# Patient Record
Sex: MALE | Race: WHITE | NOT HISPANIC OR LATINO | Employment: FULL TIME | URBAN - METROPOLITAN AREA
[De-identification: names, ages, dates, MRNs, and addresses within clinical notes are randomized per-mention and may not be internally consistent; named-entity substitution may affect disease eponyms.]

---

## 2023-09-15 ENCOUNTER — HOSPITAL ENCOUNTER (OUTPATIENT)
Dept: DATA CONVERSION | Facility: HOSPITAL | Age: 53
Discharge: HOME | End: 2023-09-15
Payer: COMMERCIAL

## 2023-09-15 DIAGNOSIS — R73.01 IMPAIRED FASTING GLUCOSE: ICD-10-CM

## 2023-09-15 DIAGNOSIS — E78.5 HYPERLIPIDEMIA, UNSPECIFIED: ICD-10-CM

## 2023-09-15 DIAGNOSIS — Z00.00 ENCOUNTER FOR GENERAL ADULT MEDICAL EXAMINATION WITHOUT ABNORMAL FINDINGS: ICD-10-CM

## 2023-09-15 DIAGNOSIS — Z12.5 ENCOUNTER FOR SCREENING FOR MALIGNANT NEOPLASM OF PROSTATE: ICD-10-CM

## 2023-09-15 LAB
ALBUMIN SERPL-MCNC: 4.1 GM/DL (ref 3.5–5)
ALBUMIN/GLOB SERPL: 1.4 RATIO (ref 1.5–3)
ALP BLD-CCNC: 83 U/L (ref 35–125)
ALT SERPL-CCNC: 33 U/L (ref 5–40)
ANION GAP SERPL CALCULATED.3IONS-SCNC: 13 MMOL/L (ref 0–19)
APPEARANCE PLAS: ABNORMAL
AST SERPL-CCNC: 24 U/L (ref 5–40)
BILIRUB SERPL-MCNC: 0.4 MG/DL (ref 0.1–1.2)
BUN SERPL-MCNC: 16 MG/DL (ref 8–25)
BUN/CREAT SERPL: 16 RATIO (ref 8–21)
CALCIUM SERPL-MCNC: 9.2 MG/DL (ref 8.5–10.4)
CHLORIDE SERPL-SCNC: 106 MMOL/L (ref 97–107)
CHOLEST SERPL-MCNC: 172 MG/DL (ref 133–200)
CHOLEST/HDLC SERPL: 5.4 RATIO
CO2 SERPL-SCNC: 24 MMOL/L (ref 24–31)
COLOR SPUN FLD: ABNORMAL
CREAT SERPL-MCNC: 1 MG/DL (ref 0.4–1.6)
DEPRECATED RDW RBC AUTO: 43.7 FL (ref 37–54)
ERYTHROCYTE [DISTWIDTH] IN BLOOD BY AUTOMATED COUNT: 12.8 % (ref 11.7–15)
FASTING STATUS PATIENT QL REPORTED: ABNORMAL
GFR SERPL CREATININE-BSD FRML MDRD: 89 ML/MIN/1.73 M2
GLOBULIN SER-MCNC: 3 G/DL (ref 1.9–3.7)
GLUCOSE SERPL-MCNC: 106 MG/DL (ref 65–99)
HBA1C MFR BLD: 5.3 % (ref 4–6)
HCT VFR BLD AUTO: 48.2 % (ref 41–50)
HDLC SERPL-MCNC: 32 MG/DL
HGB BLD-MCNC: 15.9 GM/DL (ref 13.5–16.5)
LDLC SERPL CALC-MCNC: 104 MG/DL (ref 65–130)
MCH RBC QN AUTO: 30.7 PG (ref 26–34)
MCHC RBC AUTO-ENTMCNC: 33 % (ref 31–37)
MCV RBC AUTO: 93.1 FL (ref 80–100)
NRBC BLD-RTO: 0 /100 WBC
PLATELET # BLD AUTO: 176 K/UL (ref 150–450)
PMV BLD AUTO: 10.7 CU (ref 7–12.6)
POTASSIUM SERPL-SCNC: 4.9 MMOL/L (ref 3.4–5.1)
PROT SERPL-MCNC: 7.1 G/DL (ref 5.9–7.9)
PSA SERPL-MCNC: 0.5 NG/ML (ref 0–4.1)
RBC # BLD AUTO: 5.18 M/UL (ref 4.5–5.5)
SODIUM SERPL-SCNC: 143 MMOL/L (ref 133–145)
TRIGL SERPL-MCNC: 179 MG/DL (ref 40–150)
WBC # BLD AUTO: 6 K/UL (ref 4.5–11)

## 2023-12-14 ENCOUNTER — OFFICE VISIT (OUTPATIENT)
Dept: PRIMARY CARE | Facility: CLINIC | Age: 53
End: 2023-12-14
Payer: COMMERCIAL

## 2023-12-14 VITALS
DIASTOLIC BLOOD PRESSURE: 80 MMHG | SYSTOLIC BLOOD PRESSURE: 152 MMHG | HEART RATE: 83 BPM | WEIGHT: 315 LBS | BODY MASS INDEX: 36.45 KG/M2 | HEIGHT: 78 IN | OXYGEN SATURATION: 95 %

## 2023-12-14 DIAGNOSIS — J40 BRONCHITIS: Primary | ICD-10-CM

## 2023-12-14 DIAGNOSIS — I10 ESSENTIAL HYPERTENSION, BENIGN: ICD-10-CM

## 2023-12-14 DIAGNOSIS — J45.909 PERSISTENT ASTHMA WITHOUT COMPLICATION, UNSPECIFIED ASTHMA SEVERITY (HHS-HCC): ICD-10-CM

## 2023-12-14 DIAGNOSIS — J45.909 PERSISTENT ASTHMA WITHOUT COMPLICATION, UNSPECIFIED ASTHMA SEVERITY (HHS-HCC): Primary | ICD-10-CM

## 2023-12-14 PROCEDURE — 87634 RSV DNA/RNA AMP PROBE: CPT | Performed by: PHYSICIAN ASSISTANT

## 2023-12-14 PROCEDURE — 99213 OFFICE O/P EST LOW 20 MIN: CPT | Performed by: PHYSICIAN ASSISTANT

## 2023-12-14 PROCEDURE — 87637 SARSCOV2&INF A&B&RSV AMP PRB: CPT | Performed by: PHYSICIAN ASSISTANT

## 2023-12-14 PROCEDURE — 3077F SYST BP >= 140 MM HG: CPT | Performed by: PHYSICIAN ASSISTANT

## 2023-12-14 PROCEDURE — 3079F DIAST BP 80-89 MM HG: CPT | Performed by: PHYSICIAN ASSISTANT

## 2023-12-14 RX ORDER — LISINOPRIL AND HYDROCHLOROTHIAZIDE 12.5; 2 MG/1; MG/1
1 TABLET ORAL DAILY
COMMUNITY
End: 2023-12-14 | Stop reason: SDUPTHER

## 2023-12-14 RX ORDER — LISINOPRIL AND HYDROCHLOROTHIAZIDE 12.5; 2 MG/1; MG/1
1 TABLET ORAL DAILY
Qty: 90 TABLET | Refills: 3 | Status: SHIPPED | OUTPATIENT
Start: 2023-12-14 | End: 2024-12-13

## 2023-12-14 RX ORDER — IBUPROFEN 800 MG/1
TABLET ORAL
COMMUNITY
Start: 2023-11-20

## 2023-12-14 RX ORDER — METHYLPREDNISOLONE 4 MG/1
TABLET ORAL
Qty: 21 TABLET | Refills: 0 | Status: SHIPPED | OUTPATIENT
Start: 2023-12-14 | End: 2023-12-21

## 2023-12-14 RX ORDER — NEBIVOLOL 10 MG/1
10 TABLET ORAL DAILY
COMMUNITY
Start: 2023-11-21 | End: 2023-12-14 | Stop reason: SDUPTHER

## 2023-12-14 RX ORDER — ROSUVASTATIN CALCIUM 20 MG/1
20 TABLET, COATED ORAL DAILY
COMMUNITY
End: 2024-05-13

## 2023-12-14 RX ORDER — BUDESONIDE AND FORMOTEROL FUMARATE DIHYDRATE 80; 4.5 UG/1; UG/1
AEROSOL RESPIRATORY (INHALATION) EVERY 12 HOURS
COMMUNITY
Start: 2023-09-15 | End: 2023-12-14 | Stop reason: SDUPTHER

## 2023-12-14 RX ORDER — BUDESONIDE AND FORMOTEROL FUMARATE DIHYDRATE 80; 4.5 UG/1; UG/1
2 AEROSOL RESPIRATORY (INHALATION) EVERY 12 HOURS
Qty: 3 EACH | Refills: 3 | Status: SHIPPED | OUTPATIENT
Start: 2023-12-14 | End: 2024-12-13

## 2023-12-14 RX ORDER — ALBUTEROL SULFATE 90 UG/1
AEROSOL, METERED RESPIRATORY (INHALATION)
Qty: 8.5 G | Refills: 1 | Status: SHIPPED | OUTPATIENT
Start: 2023-12-14 | End: 2024-02-20

## 2023-12-14 RX ORDER — ALBUTEROL SULFATE 90 UG/1
AEROSOL, METERED RESPIRATORY (INHALATION)
COMMUNITY
End: 2024-01-29 | Stop reason: ALTCHOICE

## 2023-12-14 RX ORDER — NEBIVOLOL 10 MG/1
10 TABLET ORAL DAILY
Qty: 90 TABLET | Refills: 3 | Status: SHIPPED | OUTPATIENT
Start: 2023-12-14 | End: 2024-12-13

## 2023-12-14 RX ORDER — METOPROLOL SUCCINATE 25 MG/1
TABLET, EXTENDED RELEASE ORAL EVERY 24 HOURS
COMMUNITY
Start: 2023-09-19 | End: 2023-12-14 | Stop reason: WASHOUT

## 2023-12-14 ASSESSMENT — PATIENT HEALTH QUESTIONNAIRE - PHQ9
2. FEELING DOWN, DEPRESSED OR HOPELESS: NOT AT ALL
SUM OF ALL RESPONSES TO PHQ9 QUESTIONS 1 AND 2: 0
1. LITTLE INTEREST OR PLEASURE IN DOING THINGS: NOT AT ALL

## 2023-12-14 NOTE — PROGRESS NOTES
"Subjective   Patient ID: Mu Escalante is a 53 y.o. male who presents for bronchitis (Patient states he has been coughing for the last few days. He has been having some chest pressure and well. ).    C/o cough, coughing spells, wheezing, SOB the last 2 days. Using Symbicort currently. Also has albuterol. Denies fever but felt sweaty last night.   Also with runny nose and sore throat. Did covid test yesterday negative x2.          Review of Systems   All other systems reviewed and are negative.      Objective   /80   Pulse 83   Ht 1.981 m (6' 6\")   Wt (!) 173 kg (380 lb 12.8 oz)   SpO2 95%   BMI 44.01 kg/m²     Physical Exam  Constitutional:       Appearance: Normal appearance.   HENT:      Head: Normocephalic.      Nose: Congestion present.   Cardiovascular:      Rate and Rhythm: Normal rate and regular rhythm.   Pulmonary:      Breath sounds: Rhonchi present.   Neurological:      Mental Status: He is alert.         Assessment/Plan   Diagnoses and all orders for this visit:  Bronchitis  -     methylPREDNISolone (Medrol Dospak) 4 mg tablets; Take as directed on package.  -     RSV PCR  -     Sars-CoV-2 and Influenza A/B PCR  Persistent asthma without complication, unspecified asthma severity  -     Symbicort 80-4.5 mcg/actuation inhaler; Inhale 2 puffs every 12 hours.  Essential hypertension, benign  -     lisinopriL-hydrochlorothiazide 20-12.5 mg tablet; Take 1 tablet by mouth once daily.  -     nebivolol (Bystolic) 10 mg tablet; Take 1 tablet (10 mg) by mouth once daily.         "

## 2023-12-15 LAB
FLUAV RNA RESP QL NAA+PROBE: NOT DETECTED
FLUBV RNA RESP QL NAA+PROBE: NOT DETECTED
RSV RNA RESP QL NAA+PROBE: NOT DETECTED
SARS-COV-2 RNA RESP QL NAA+PROBE: NOT DETECTED

## 2024-01-29 ENCOUNTER — PROCEDURE VISIT (OUTPATIENT)
Dept: SURGERY | Facility: CLINIC | Age: 54
End: 2024-01-29
Payer: COMMERCIAL

## 2024-01-29 VITALS
HEIGHT: 77 IN | HEART RATE: 82 BPM | TEMPERATURE: 98.2 F | WEIGHT: 315 LBS | BODY MASS INDEX: 37.19 KG/M2 | DIASTOLIC BLOOD PRESSURE: 88 MMHG | SYSTOLIC BLOOD PRESSURE: 124 MMHG | OXYGEN SATURATION: 95 %

## 2024-01-29 DIAGNOSIS — N49.2 SCROTAL ABSCESS: Primary | ICD-10-CM

## 2024-01-29 DIAGNOSIS — L02.91 ABSCESS: ICD-10-CM

## 2024-01-29 PROCEDURE — 2500000005 HC RX 250 GENERAL PHARMACY W/O HCPCS: Performed by: SURGERY

## 2024-01-29 PROCEDURE — 54700 I&D EPDIDYMS TSTIS&/SCROT SP: CPT | Performed by: SURGERY

## 2024-01-29 PROCEDURE — 87185 SC STD ENZYME DETCJ PER NZM: CPT | Performed by: SURGERY

## 2024-01-29 PROCEDURE — 96372 THER/PROPH/DIAG INJ SC/IM: CPT | Performed by: SURGERY

## 2024-01-29 RX ORDER — LIDOCAINE HYDROCHLORIDE AND EPINEPHRINE 5; 5 MG/ML; UG/ML
20 INJECTION, SOLUTION INFILTRATION; PERINEURAL ONCE
Status: COMPLETED | OUTPATIENT
Start: 2024-01-29 | End: 2024-01-29

## 2024-01-29 RX ORDER — AMOXICILLIN AND CLAVULANATE POTASSIUM 875; 125 MG/1; MG/1
1 TABLET, FILM COATED ORAL 2 TIMES DAILY
Qty: 28 TABLET | Refills: 0 | Status: SHIPPED | OUTPATIENT
Start: 2024-01-29 | End: 2024-02-12

## 2024-01-29 RX ADMIN — LIDOCAINE HYDROCHLORIDE,EPINEPHRINE BITARTRATE 20 ML: 5; .005 INJECTION, SOLUTION INFILTRATION; PERINEURAL at 10:26

## 2024-01-29 ASSESSMENT — PAIN SCALES - GENERAL
PAINLEVEL: 2
PAINLEVEL_OUTOF10: 2

## 2024-01-29 ASSESSMENT — PATIENT HEALTH QUESTIONNAIRE - PHQ9
2. FEELING DOWN, DEPRESSED OR HOPELESS: NOT AT ALL
1. LITTLE INTEREST OR PLEASURE IN DOING THINGS: NOT AT ALL
SUM OF ALL RESPONSES TO PHQ9 QUESTIONS 1 AND 2: 0

## 2024-01-29 NOTE — PROGRESS NOTES
Patient ID: Mu Escalante is a 53 y.o. male.  With morbid obesity and recurrent groin abscesses.  He presents today with a right scrotal abscess that has been worsening over the last few days.    Incision & Drainage    Date/Time: 1/29/2024 10:55 AM    Performed by: Yovanny Burrell MD  Authorized by: Yovanny Burrell MD    Consent:     Consent obtained:  Written    Consent given by:  Patient    Risks, benefits, and alternatives were discussed: yes      Risks discussed:  Bleeding, incomplete drainage, pain, damage to other organs and infection    Alternatives discussed:  No treatment, delayed treatment, alternative treatment and observation  Universal protocol:     Procedure explained and questions answered to patient or proxy's satisfaction: yes      Relevant documents present and verified: yes      Test results available : yes      Imaging studies available: yes      Required blood products, implants, devices, and special equipment available: yes      Site/side marked: yes      Immediately prior to procedure, a time out was called: yes      Patient identity confirmed:  Verbally with patient  Location:     Type:  Abscess    Location:  Anogenital    Anogenital location:  Scrotal space  Pre-procedure details:     Skin preparation:  Chlorhexidine with alcohol  Sedation:     Sedation type:  None  Anesthesia:     Anesthesia method:  Local infiltration    Local anesthetic:  Lidocaine 1% WITH epi  Procedure type:     Complexity:  Complex  Procedure details:     Ultrasound guidance: no      Needle aspiration: no      Incision types:  Elliptical    Incision depth:  Subcutaneous    Wound management:  Probed and deloculated, irrigated with saline and extensive cleaning    Drainage:  Purulent    Drainage amount:  Copious    Packing materials:  Wick placed  Post-procedure details:     Procedure completion:  Tolerated well, no immediate complications  Comments:      Risks, benefits, and alternatives to incision and drainage  were explained including but not limited to bleeding, infection, need for further procedures, need for antibiotics, scar, pain.  The alternative of antibiotic treatment alone is suboptimal for most types of abscesses.  Patient agreed to proceed.  Consent was obtained.  Time-out was performed.  The skin was prepped and draped.  20 mL of 1% lidocaine with epinephrine were injected around the abscess.  An 11 blade was used to make an elliptical incision around the abscess.  Cultures were obtained from the pus.  Excisional debridement of skin and subcutaneous fat measuring 2 cm x 1 cm was performed and carried down to healthy viable bleeding subcutaneous fat.  The wound was then packed with a 4 x 4 gauze.  Estimated blood loss was 5 mL.  The patient tolerated the procedure well and was discharged home in stable condition.

## 2024-02-01 LAB
B-LACTAMASE ORGANISM ISLT: POSITIVE
BACTERIA SPEC CULT: NORMAL
GRAM STN SPEC: NORMAL
GRAM STN SPEC: NORMAL

## 2024-02-20 DIAGNOSIS — J45.909 PERSISTENT ASTHMA WITHOUT COMPLICATION, UNSPECIFIED ASTHMA SEVERITY (HHS-HCC): ICD-10-CM

## 2024-02-20 RX ORDER — ALBUTEROL SULFATE 90 UG/1
AEROSOL, METERED RESPIRATORY (INHALATION)
Qty: 8.5 G | Refills: 1 | Status: SHIPPED | OUTPATIENT
Start: 2024-02-20 | End: 2024-05-17

## 2024-03-06 PROBLEM — S83.242A TEAR OF MEDIAL MENISCUS OF LEFT KNEE, CURRENT: Status: ACTIVE | Noted: 2024-03-06

## 2024-03-06 PROBLEM — S82.831A CLOSED FRACTURE OF PROXIMAL END OF RIGHT FIBULA: Status: ACTIVE | Noted: 2018-09-19

## 2024-03-06 PROBLEM — J45.909 ASTHMA (HHS-HCC): Status: ACTIVE | Noted: 2024-03-06

## 2024-03-06 PROBLEM — H10.32 ACUTE CONJUNCTIVITIS, LEFT EYE: Status: ACTIVE | Noted: 2022-06-05

## 2024-03-06 PROBLEM — F17.219 NICOTINE DEPENDENCE, CIGARETTES, WITH UNSPECIFIED NICOTINE-INDUCED DISORDERS: Status: ACTIVE | Noted: 2024-03-06

## 2024-03-06 PROBLEM — N40.1 LOWER URINARY TRACT SYMPTOMS DUE TO BENIGN PROSTATIC HYPERPLASIA: Status: ACTIVE | Noted: 2024-03-06

## 2024-03-06 PROBLEM — F17.200 SMOKER: Status: ACTIVE | Noted: 2024-03-06

## 2024-03-06 PROBLEM — S92.301A MULTIPLE CLOSED FRACTURES OF METATARSAL BONE OF RIGHT FOOT: Status: ACTIVE | Noted: 2018-09-19

## 2024-03-06 PROBLEM — N18.2 STAGE 2 CHRONIC KIDNEY DISEASE: Status: ACTIVE | Noted: 2024-03-06

## 2024-03-06 PROBLEM — S22.41XB MULTIPLE OPEN FRACTURES OF RIBS OF RIGHT SIDE: Status: ACTIVE | Noted: 2018-09-15

## 2024-03-06 PROBLEM — S83.92XA SPRAIN OF LEFT KNEE: Status: ACTIVE | Noted: 2024-03-06

## 2024-03-06 PROBLEM — E66.01 SEVERE OBESITY (BMI >= 40) (MULTI): Status: ACTIVE | Noted: 2024-03-06

## 2024-03-06 PROBLEM — S22.49XA FRACTURE OF MULTIPLE RIBS: Status: ACTIVE | Noted: 2024-03-06

## 2024-03-06 PROBLEM — L03.116 CELLULITIS OF LEFT LOWER EXTREMITY: Status: ACTIVE | Noted: 2024-03-06

## 2024-03-06 PROBLEM — S42.111A CLOSED DISPLACED FRACTURE OF BODY OF RIGHT SCAPULA: Status: ACTIVE | Noted: 2018-09-19

## 2024-03-06 PROBLEM — S29.019A THORACIC MYOFASCIAL STRAIN: Status: ACTIVE | Noted: 2024-03-06

## 2024-03-06 PROBLEM — M25.561 KNEE PAIN, RIGHT: Status: ACTIVE | Noted: 2024-03-06

## 2024-03-06 PROBLEM — M25.50 ARTHRALGIA: Status: ACTIVE | Noted: 2024-03-06

## 2024-03-06 PROBLEM — J93.9 PNEUMOTHORAX: Status: ACTIVE | Noted: 2024-03-06

## 2024-03-06 PROBLEM — E66.01 MORBID OBESITY (MULTI): Status: ACTIVE | Noted: 2024-03-06
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
## 2024-03-06 PROBLEM — I10 ESSENTIAL HYPERTENSION: Status: ACTIVE | Noted: 2024-03-06

## 2024-03-06 PROBLEM — E55.9 VITAMIN D DEFICIENCY: Status: ACTIVE | Noted: 2024-03-06

## 2024-03-06 PROBLEM — R00.2 PALPITATIONS: Status: ACTIVE | Noted: 2024-03-06

## 2024-03-06 PROBLEM — E78.5 DYSLIPIDEMIA: Status: ACTIVE | Noted: 2024-03-06

## 2024-03-06 PROBLEM — I87.2 ACUTE STASIS DERMATITIS OF RIGHT LOWER EXTREMITY: Status: ACTIVE | Noted: 2024-03-06

## 2024-03-06 PROBLEM — M25.362 INSTABILITY OF LEFT KNEE JOINT: Status: ACTIVE | Noted: 2024-03-06

## 2024-03-06 PROBLEM — G93.32 CHRONIC FATIGUE SYNDROME: Status: ACTIVE | Noted: 2024-03-06

## 2024-03-06 PROBLEM — R73.01 IMPAIRED FASTING GLUCOSE: Status: ACTIVE | Noted: 2024-03-06

## 2024-03-06 PROBLEM — N40.1 BENIGN PROSTATIC HYPERPLASIA WITH URINARY OBSTRUCTION: Status: ACTIVE | Noted: 2024-03-06

## 2024-03-06 PROBLEM — I50.9 HEART FAILURE (MULTI): Status: ACTIVE | Noted: 2024-03-06

## 2024-03-06 PROBLEM — M17.9 DJD (DEGENERATIVE JOINT DISEASE) OF KNEE: Status: ACTIVE | Noted: 2024-03-06

## 2024-03-06 PROBLEM — N13.8 BENIGN PROSTATIC HYPERPLASIA WITH URINARY OBSTRUCTION: Status: ACTIVE | Noted: 2024-03-06

## 2024-03-06 PROBLEM — J06.9 URI, ACUTE: Status: ACTIVE | Noted: 2023-09-10

## 2024-03-06 PROBLEM — Z98.890 S/P LEFT KNEE ARTHROSCOPY: Status: ACTIVE | Noted: 2024-03-06

## 2024-03-06 PROBLEM — T30.0 BURN INJURY: Status: ACTIVE | Noted: 2024-03-06

## 2024-03-06 PROBLEM — R22.0 LOCALIZED SWELLING, MASS AND LUMP, HEAD: Status: ACTIVE | Noted: 2022-06-05

## 2024-03-06 PROBLEM — B35.3 TINEA PEDIS: Status: ACTIVE | Noted: 2024-03-06

## 2024-03-06 PROBLEM — M54.9 BACK PAIN: Status: ACTIVE | Noted: 2024-03-06

## 2024-03-06 PROBLEM — R06.00 DYSPNEA: Status: ACTIVE | Noted: 2024-03-06

## 2024-03-06 PROBLEM — G62.9 POLYNEUROPATHY: Status: ACTIVE | Noted: 2024-03-06

## 2024-03-06 PROBLEM — D22.9 PIGMENTED NEVUS: Status: ACTIVE | Noted: 2024-03-06

## 2024-03-06 PROBLEM — E78.5 HYPERLIPIDEMIA: Status: ACTIVE | Noted: 2024-03-06

## 2024-03-06 PROBLEM — J40 BRONCHITIS: Status: ACTIVE | Noted: 2024-03-06

## 2024-03-06 PROBLEM — S72.401A CLOSED FRACTURE OF RIGHT DISTAL FEMUR (MULTI): Status: ACTIVE | Noted: 2018-09-18

## 2024-03-06 PROBLEM — L29.3: Status: ACTIVE | Noted: 2022-12-29

## 2024-03-06 PROBLEM — Q85.9 VASCULAR HAMARTOMA (MULTI): Status: ACTIVE | Noted: 2024-03-06

## 2024-03-06 PROBLEM — R60.0 LOCALIZED EDEMA: Status: ACTIVE | Noted: 2024-03-06

## 2024-03-06 RX ORDER — BENZONATATE 200 MG/1
CAPSULE ORAL EVERY 8 HOURS
COMMUNITY
Start: 2023-09-10

## 2024-03-06 RX ORDER — GABAPENTIN 600 MG/1
TABLET ORAL
COMMUNITY
Start: 2019-04-18

## 2024-03-06 RX ORDER — METOPROLOL TARTRATE 25 MG/1
TABLET, FILM COATED ORAL 2 TIMES DAILY
COMMUNITY
Start: 2018-10-19

## 2024-03-06 RX ORDER — CYCLOBENZAPRINE HCL 10 MG
1 TABLET ORAL 3 TIMES DAILY PRN
COMMUNITY
Start: 2019-07-22

## 2024-03-06 RX ORDER — AMLODIPINE BESYLATE 5 MG/1
1 TABLET ORAL DAILY
COMMUNITY
Start: 2018-07-19

## 2024-03-06 RX ORDER — DOXAZOSIN 4 MG/1
1 TABLET ORAL DAILY
COMMUNITY
Start: 2019-05-16

## 2024-03-06 RX ORDER — AMOXICILLIN 875 MG/1
TABLET, FILM COATED ORAL
COMMUNITY
Start: 2023-09-10

## 2024-03-06 RX ORDER — HYDROCODONE BITARTRATE AND ACETAMINOPHEN 5; 325 MG/1; MG/1
1 TABLET ORAL EVERY 4 HOURS PRN
COMMUNITY
Start: 2011-08-24

## 2024-03-06 RX ORDER — IBUPROFEN 800 MG/1
TABLET ORAL EVERY 8 HOURS
COMMUNITY

## 2024-03-07 ENCOUNTER — OFFICE VISIT (OUTPATIENT)
Dept: ORTHOPEDIC SURGERY | Facility: CLINIC | Age: 54
End: 2024-03-07
Payer: COMMERCIAL

## 2024-03-07 ENCOUNTER — HOSPITAL ENCOUNTER (OUTPATIENT)
Dept: RADIOLOGY | Facility: CLINIC | Age: 54
Discharge: HOME | End: 2024-03-07
Payer: COMMERCIAL

## 2024-03-07 VITALS — WEIGHT: 315 LBS | BODY MASS INDEX: 45.49 KG/M2

## 2024-03-07 DIAGNOSIS — M25.562 LEFT KNEE PAIN, UNSPECIFIED CHRONICITY: Primary | ICD-10-CM

## 2024-03-07 DIAGNOSIS — Z98.890 S/P LEFT KNEE ARTHROSCOPY: ICD-10-CM

## 2024-03-07 DIAGNOSIS — M17.12 PRIMARY OSTEOARTHRITIS OF LEFT KNEE: ICD-10-CM

## 2024-03-07 DIAGNOSIS — M25.569 KNEE PAIN: ICD-10-CM

## 2024-03-07 PROCEDURE — 99203 OFFICE O/P NEW LOW 30 MIN: CPT | Performed by: ORTHOPAEDIC SURGERY

## 2024-03-07 PROCEDURE — 2500000005 HC RX 250 GENERAL PHARMACY W/O HCPCS: Performed by: ORTHOPAEDIC SURGERY

## 2024-03-07 PROCEDURE — 2500000004 HC RX 250 GENERAL PHARMACY W/ HCPCS (ALT 636 FOR OP/ED): Performed by: ORTHOPAEDIC SURGERY

## 2024-03-07 PROCEDURE — 73560 X-RAY EXAM OF KNEE 1 OR 2: CPT | Mod: LT

## 2024-03-07 PROCEDURE — 99213 OFFICE O/P EST LOW 20 MIN: CPT | Performed by: ORTHOPAEDIC SURGERY

## 2024-03-07 PROCEDURE — 20610 DRAIN/INJ JOINT/BURSA W/O US: CPT | Performed by: ORTHOPAEDIC SURGERY

## 2024-03-07 RX ORDER — LIDOCAINE HYDROCHLORIDE 10 MG/ML
1 INJECTION INFILTRATION; PERINEURAL
Status: COMPLETED | OUTPATIENT
Start: 2024-03-07 | End: 2024-03-07

## 2024-03-07 RX ORDER — TRIAMCINOLONE ACETONIDE 40 MG/ML
10 INJECTION, SUSPENSION INTRA-ARTICULAR; INTRAMUSCULAR
Status: COMPLETED | OUTPATIENT
Start: 2024-03-07 | End: 2024-03-07

## 2024-03-07 RX ADMIN — LIDOCAINE HYDROCHLORIDE 1 ML: 10 INJECTION, SOLUTION INFILTRATION; PERINEURAL at 09:10

## 2024-03-07 RX ADMIN — TRIAMCINOLONE ACETONIDE 10 MG: 400 INJECTION, SUSPENSION INTRA-ARTICULAR; INTRAMUSCULAR at 09:10

## 2024-03-07 ASSESSMENT — LIFESTYLE VARIABLES: TOTAL SCORE: 0

## 2024-03-07 ASSESSMENT — ENCOUNTER SYMPTOMS
OCCASIONAL FEELINGS OF UNSTEADINESS: 0
DEPRESSION: 0
LOSS OF SENSATION IN FEET: 0

## 2024-03-07 ASSESSMENT — PAIN SCALES - GENERAL: PAINLEVEL_OUTOF10: 8

## 2024-03-07 ASSESSMENT — PAIN DESCRIPTION - DESCRIPTORS: DESCRIPTORS: ACHING;SHARP

## 2024-03-07 ASSESSMENT — PAIN - FUNCTIONAL ASSESSMENT: PAIN_FUNCTIONAL_ASSESSMENT: 0-10

## 2024-03-07 NOTE — PROGRESS NOTES
Subjective      Chief Complaint   Patient presents with    Left Knee - Pain        No surgery found     HPI  This 53 year old patient presents today with  left knee pain. The patient states that this left knee pain has been present for years. He has a history of left knee arthroscopy done in 12 of 2020. The patient rates the knee pain as 7. The patient states that knee pain is worse with and aggravated by activity and bearing weight. The patient states the knee is catching and grinding the patient has tried ibuprofen and tylenol with no relief. Patient requests a discussion of further treatment options on examination today.     CARDIOLOGY:   Negative for chest pain, shortness of breath.   RESPIRATORY:   Negative for chest pain, shortness of breath.   MUSCULOSKELETAL:   See HPI for details.   NEUROLOGY:   Negative for tingling, numbness, weakness.    Objective    There were no vitals filed for this visit.    Knee Exam  Constitutional: Appears stated age. No apparent distress  Labored Breathing: No  Psychiatric: Normal mood and effect.   Neurological: alert and oriented x3  Skin: intact  MUSCULOSKELETAL: Neck: No tenderness. No pain or limitation with range of motion. Back: No tenderness. Straight leg test negative bilaterally. left knee: There is diffuse tenderness over the knee. diminished range of motion McMurrays is negative. Anterior drawer and lachmans are negative. There is not an effusion present. The knee is stable to valgus and varus stressing. The patient walks with a painful gait favoring the left knee while walking.    AP and lateral x-rays of the left knee done and read in office today are reviewed with the patient in the office today and show osteoarthritis of the left knee with loss of joint surface cartilage worse medially. There has been previous ORIF of the right femur in good position.     Patient ID: Mu Escalante is a 53 y.o. male.    L Inj/Asp: L knee on 3/7/2024 9:10 AM  Indications:  pain  Details: 22 G needle, medial approach  Medications: 1 mL lidocaine 10 mg/mL (1 %); 10 mg triamcinolone acetonide 40 mg/mL  Outcome: tolerated well, no immediate complications  Procedure, treatment alternatives, risks and benefits explained, specific risks discussed. Immediately prior to procedure a time out was called to verify the correct patient, procedure, equipment, support staff and site/side marked as required. Patient was prepped and draped in the usual sterile fashion.         Mu was seen today for pain.  Diagnoses and all orders for this visit:  Left knee pain, unspecified chronicity (Primary)  S/P left knee arthroscopy  Primary osteoarthritis of left knee  Other orders  -     L Inj/Asp       Yovanny Bishop MD

## 2024-03-13 DIAGNOSIS — M17.10 PRIMARY OSTEOARTHRITIS OF KNEE, UNSPECIFIED LATERALITY: Primary | ICD-10-CM

## 2024-03-13 RX ORDER — IBUPROFEN 800 MG/1
TABLET ORAL
Qty: 90 TABLET | Refills: 1 | Status: SHIPPED | OUTPATIENT
Start: 2024-03-13

## 2024-05-12 DIAGNOSIS — E78.5 DYSLIPIDEMIA: Primary | ICD-10-CM

## 2024-05-13 RX ORDER — ROSUVASTATIN CALCIUM 20 MG/1
20 TABLET, COATED ORAL DAILY
Qty: 90 TABLET | Refills: 1 | Status: SHIPPED | OUTPATIENT
Start: 2024-05-13

## 2024-05-16 DIAGNOSIS — J45.909 PERSISTENT ASTHMA WITHOUT COMPLICATION, UNSPECIFIED ASTHMA SEVERITY (HHS-HCC): ICD-10-CM

## 2024-05-17 RX ORDER — ALBUTEROL SULFATE 90 UG/1
AEROSOL, METERED RESPIRATORY (INHALATION)
Qty: 8.5 G | Refills: 1 | Status: SHIPPED | OUTPATIENT
Start: 2024-05-17

## 2024-06-24 ENCOUNTER — TELEPHONE (OUTPATIENT)
Dept: PRIMARY CARE | Facility: CLINIC | Age: 54
End: 2024-06-24
Payer: COMMERCIAL

## 2024-07-05 DIAGNOSIS — J45.909 PERSISTENT ASTHMA WITHOUT COMPLICATION, UNSPECIFIED ASTHMA SEVERITY (HHS-HCC): ICD-10-CM

## 2024-07-05 RX ORDER — ALBUTEROL SULFATE 90 UG/1
AEROSOL, METERED RESPIRATORY (INHALATION)
Qty: 8.5 G | Refills: 1 | Status: SHIPPED | OUTPATIENT
Start: 2024-07-05

## 2024-07-05 NOTE — TELEPHONE ENCOUNTER
Last completed in September 2023, usually this is done every 12 months. Recommend scheduling annual exam for September

## 2024-08-19 ENCOUNTER — OFFICE VISIT (OUTPATIENT)
Dept: SURGERY | Facility: CLINIC | Age: 54
End: 2024-08-19
Payer: COMMERCIAL

## 2024-08-19 VITALS
OXYGEN SATURATION: 96 % | SYSTOLIC BLOOD PRESSURE: 110 MMHG | HEART RATE: 89 BPM | TEMPERATURE: 98 F | BODY MASS INDEX: 37.19 KG/M2 | DIASTOLIC BLOOD PRESSURE: 70 MMHG | WEIGHT: 315 LBS | HEIGHT: 77 IN

## 2024-08-19 DIAGNOSIS — N49.2 SCROTAL ABSCESS: Primary | ICD-10-CM

## 2024-08-19 PROCEDURE — 99213 OFFICE O/P EST LOW 20 MIN: CPT | Performed by: STUDENT IN AN ORGANIZED HEALTH CARE EDUCATION/TRAINING PROGRAM

## 2024-08-19 PROCEDURE — 3008F BODY MASS INDEX DOCD: CPT | Performed by: STUDENT IN AN ORGANIZED HEALTH CARE EDUCATION/TRAINING PROGRAM

## 2024-08-19 PROCEDURE — 3078F DIAST BP <80 MM HG: CPT | Performed by: STUDENT IN AN ORGANIZED HEALTH CARE EDUCATION/TRAINING PROGRAM

## 2024-08-19 PROCEDURE — 3074F SYST BP LT 130 MM HG: CPT | Performed by: STUDENT IN AN ORGANIZED HEALTH CARE EDUCATION/TRAINING PROGRAM

## 2024-08-19 RX ORDER — CHLORHEXIDINE GLUCONATE 40 MG/ML
SOLUTION TOPICAL DAILY PRN
Qty: 236 ML | Refills: 1 | Status: SHIPPED | OUTPATIENT
Start: 2024-08-19

## 2024-08-19 ASSESSMENT — PATIENT HEALTH QUESTIONNAIRE - PHQ9
1. LITTLE INTEREST OR PLEASURE IN DOING THINGS: NOT AT ALL
2. FEELING DOWN, DEPRESSED OR HOPELESS: NOT AT ALL
SUM OF ALL RESPONSES TO PHQ9 QUESTIONS 1 AND 2: 0

## 2024-08-19 ASSESSMENT — ENCOUNTER SYMPTOMS
VOMITING: 0
BLOOD IN STOOL: 0
UNEXPECTED WEIGHT CHANGE: 0
DIARRHEA: 0
WOUND: 1
ABDOMINAL PAIN: 0
FEVER: 0
SHORTNESS OF BREATH: 0
BRUISES/BLEEDS EASILY: 0
HEMATURIA: 0
SORE THROAT: 0
FACIAL ASYMMETRY: 0
SPEECH DIFFICULTY: 0
CHEST TIGHTNESS: 0
VOICE CHANGE: 0
CHILLS: 0
ADENOPATHY: 0
DYSURIA: 0
PALPITATIONS: 0
NAUSEA: 0
TROUBLE SWALLOWING: 0
HEADACHES: 0
ARTHRALGIAS: 0

## 2024-08-19 ASSESSMENT — PAIN SCALES - GENERAL: PAINLEVEL: 0-NO PAIN
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
## 2024-09-03 ENCOUNTER — TELEPHONE (OUTPATIENT)
Dept: SURGERY | Facility: CLINIC | Age: 54
End: 2024-09-03
Payer: COMMERCIAL

## 2024-09-03 NOTE — TELEPHONE ENCOUNTER
Message reviewed by Dr. Youngblood and she advised that  Pt can contact Dr. Peñaloza for an appt. I spoke to pt verified by name/.  I relayed Dr. Youngblood's message and phone number provided.   Pt verbalized understanding, denies further questions.

## 2024-09-03 NOTE — TELEPHONE ENCOUNTER
Spoke to pt verified by name/.  Pt was seen in the office on 24 for recurrent abscess.   Pt is calling c/o mid-center scrotal swelling since Sat night  And bloody drainage. Also, c/o 4-5/10 pain. Denies fever or chills.   [facility] pharmacy updated. Pls review/advise if pt needs atb or   Needs to be seen in the office.

## 2024-09-04 ENCOUNTER — TRANSCRIBE ORDERS (OUTPATIENT)
Dept: SURGERY | Facility: CLINIC | Age: 54
End: 2024-09-04

## 2024-09-04 ENCOUNTER — HOSPITAL ENCOUNTER (OUTPATIENT)
Dept: RADIOLOGY | Facility: CLINIC | Age: 54
Discharge: HOME | End: 2024-09-04
Payer: COMMERCIAL

## 2024-09-04 ENCOUNTER — OFFICE VISIT (OUTPATIENT)
Dept: SURGERY | Facility: CLINIC | Age: 54
End: 2024-09-04
Payer: COMMERCIAL

## 2024-09-04 VITALS — BODY MASS INDEX: 37.19 KG/M2 | HEIGHT: 77 IN | WEIGHT: 315 LBS

## 2024-09-04 DIAGNOSIS — Z01.818 ENCOUNTER FOR PREOPERATIVE EXAMINATION FOR GENERAL SURGICAL PROCEDURE: ICD-10-CM

## 2024-09-04 DIAGNOSIS — N49.2 SCROTAL ABSCESS: ICD-10-CM

## 2024-09-04 DIAGNOSIS — N49.2 ABSCESS OF SCROTUM: ICD-10-CM

## 2024-09-04 PROCEDURE — 3008F BODY MASS INDEX DOCD: CPT | Performed by: SURGERY

## 2024-09-04 PROCEDURE — 72192 CT PELVIS W/O DYE: CPT

## 2024-09-04 PROCEDURE — 99213 OFFICE O/P EST LOW 20 MIN: CPT | Performed by: SURGERY

## 2024-09-04 RX ORDER — AMOXICILLIN AND CLAVULANATE POTASSIUM 875; 125 MG/1; MG/1
1 TABLET, FILM COATED ORAL 2 TIMES DAILY
Qty: 10 TABLET | Refills: 0 | Status: SHIPPED | OUTPATIENT
Start: 2024-09-04 | End: 2024-09-09

## 2024-09-04 ASSESSMENT — ENCOUNTER SYMPTOMS
APPETITE CHANGE: 0
FEVER: 0
AGITATION: 0
CHILLS: 0
ACTIVITY CHANGE: 0
DIZZINESS: 0
DIAPHORESIS: 0
SHORTNESS OF BREATH: 0
CONFUSION: 0

## 2024-09-04 NOTE — PROGRESS NOTES
Subjective   Patient ID: uM Escalante is a 53 y.o. male who presents for New Patient Visit.    He has a history of obesity, hyperlipidemia, and hypertension who presents for evaluation of a perineal abscess.  He has been dealing with the same abscess now for the last 3 weeks.  It has been draining on its own, but continues to persist.  He has a history of multiple, recurrent abscesses in his bilateral groins as well as perianal area.  He has never been told that he has hidradenitis.  He has required incision and drainage procedures in the past.  She has been draining some purulent fluid as well as blood from the peritoneum.    Review of Systems   Constitutional:  Negative for activity change, appetite change, chills, diaphoresis and fever.   Respiratory:  Negative for shortness of breath.    Cardiovascular:  Negative for chest pain.   Neurological:  Negative for dizziness.   Psychiatric/Behavioral:  Negative for agitation and confusion.    All other systems reviewed and are negative.      Objective   Physical Exam  HENT:      Head: Normocephalic.   Cardiovascular:      Rate and Rhythm: Normal rate.   Abdominal:      General: Abdomen is flat.      Palpations: Abdomen is soft.   Genitourinary:     Comments: Perineal abscess 3 cm x 2 cm.  Mild erythema, mild purulent and bloody drainage.    Neurological:      General: No focal deficit present.      Mental Status: He is alert.   Psychiatric:         Mood and Affect: Mood normal.         Assessment/Plan   The patient is a 53-year-old man who presents for evaluation of a perineal abscess.  We will plan to get a follow-up CT scan to see how deep the infection is, and how extensively involves the scrotum.  Will also put him on some oral antibiotics, and get him scheduled for the operating room this Friday for incision and drainage of his perineum under anesthesia.         Shailesh Peñaloza MD 09/04/24 11:19 AM

## 2024-09-05 ENCOUNTER — ANESTHESIA EVENT (OUTPATIENT)
Dept: OPERATING ROOM | Facility: HOSPITAL | Age: 54
End: 2024-09-05
Payer: COMMERCIAL

## 2024-09-06 ENCOUNTER — ANESTHESIA (OUTPATIENT)
Dept: OPERATING ROOM | Facility: HOSPITAL | Age: 54
End: 2024-09-06
Payer: COMMERCIAL

## 2024-09-06 ENCOUNTER — HOSPITAL ENCOUNTER (OUTPATIENT)
Facility: HOSPITAL | Age: 54
Setting detail: OUTPATIENT SURGERY
Discharge: HOME | End: 2024-09-06
Attending: SURGERY | Admitting: SURGERY
Payer: COMMERCIAL

## 2024-09-06 VITALS
TEMPERATURE: 97 F | WEIGHT: 315 LBS | HEART RATE: 71 BPM | RESPIRATION RATE: 16 BRPM | SYSTOLIC BLOOD PRESSURE: 134 MMHG | DIASTOLIC BLOOD PRESSURE: 89 MMHG | OXYGEN SATURATION: 96 % | HEIGHT: 77 IN | BODY MASS INDEX: 37.19 KG/M2

## 2024-09-06 DIAGNOSIS — N49.2 ABSCESS OF SCROTUM: ICD-10-CM

## 2024-09-06 DIAGNOSIS — G89.18 ACUTE POST-OPERATIVE PAIN: Primary | ICD-10-CM

## 2024-09-06 PROCEDURE — 2500000004 HC RX 250 GENERAL PHARMACY W/ HCPCS (ALT 636 FOR OP/ED): Performed by: STUDENT IN AN ORGANIZED HEALTH CARE EDUCATION/TRAINING PROGRAM

## 2024-09-06 PROCEDURE — 3600000008 HC OR TIME - EACH INCREMENTAL 1 MINUTE - PROCEDURE LEVEL THREE: Performed by: SURGERY

## 2024-09-06 PROCEDURE — 2500000005 HC RX 250 GENERAL PHARMACY W/O HCPCS: Performed by: SURGERY

## 2024-09-06 PROCEDURE — 7100000009 HC PHASE TWO TIME - INITIAL BASE CHARGE: Performed by: SURGERY

## 2024-09-06 PROCEDURE — 3600000003 HC OR TIME - INITIAL BASE CHARGE - PROCEDURE LEVEL THREE: Performed by: SURGERY

## 2024-09-06 PROCEDURE — 7100000002 HC RECOVERY ROOM TIME - EACH INCREMENTAL 1 MINUTE: Performed by: SURGERY

## 2024-09-06 PROCEDURE — 2500000005 HC RX 250 GENERAL PHARMACY W/O HCPCS: Performed by: STUDENT IN AN ORGANIZED HEALTH CARE EDUCATION/TRAINING PROGRAM

## 2024-09-06 PROCEDURE — 2500000004 HC RX 250 GENERAL PHARMACY W/ HCPCS (ALT 636 FOR OP/ED): Performed by: ANESTHESIOLOGY

## 2024-09-06 PROCEDURE — 87205 SMEAR GRAM STAIN: CPT | Mod: WESLAB | Performed by: SURGERY

## 2024-09-06 PROCEDURE — 3700000002 HC GENERAL ANESTHESIA TIME - EACH INCREMENTAL 1 MINUTE: Performed by: SURGERY

## 2024-09-06 PROCEDURE — 46050 I&D PERIANAL ABSCESS SUPFC: CPT | Performed by: SURGERY

## 2024-09-06 PROCEDURE — 7100000010 HC PHASE TWO TIME - EACH INCREMENTAL 1 MINUTE: Performed by: SURGERY

## 2024-09-06 PROCEDURE — 2500000004 HC RX 250 GENERAL PHARMACY W/ HCPCS (ALT 636 FOR OP/ED): Performed by: SURGERY

## 2024-09-06 PROCEDURE — 2500000005 HC RX 250 GENERAL PHARMACY W/O HCPCS

## 2024-09-06 PROCEDURE — 2500000004 HC RX 250 GENERAL PHARMACY W/ HCPCS (ALT 636 FOR OP/ED)

## 2024-09-06 PROCEDURE — 7100000001 HC RECOVERY ROOM TIME - INITIAL BASE CHARGE: Performed by: SURGERY

## 2024-09-06 PROCEDURE — 2720000007 HC OR 272 NO HCPCS: Performed by: SURGERY

## 2024-09-06 PROCEDURE — 3700000001 HC GENERAL ANESTHESIA TIME - INITIAL BASE CHARGE: Performed by: SURGERY

## 2024-09-06 RX ORDER — LIDOCAINE HYDROCHLORIDE 10 MG/ML
0.1 INJECTION INFILTRATION; PERINEURAL ONCE
Status: DISCONTINUED | OUTPATIENT
Start: 2024-09-06 | End: 2024-09-06 | Stop reason: HOSPADM

## 2024-09-06 RX ORDER — ONDANSETRON HYDROCHLORIDE 2 MG/ML
INJECTION, SOLUTION INTRAVENOUS AS NEEDED
Status: DISCONTINUED | OUTPATIENT
Start: 2024-09-06 | End: 2024-09-06

## 2024-09-06 RX ORDER — LABETALOL HYDROCHLORIDE 5 MG/ML
5 INJECTION, SOLUTION INTRAVENOUS ONCE AS NEEDED
Status: DISCONTINUED | OUTPATIENT
Start: 2024-09-06 | End: 2024-09-06 | Stop reason: HOSPADM

## 2024-09-06 RX ORDER — FENTANYL CITRATE 50 UG/ML
INJECTION, SOLUTION INTRAMUSCULAR; INTRAVENOUS AS NEEDED
Status: DISCONTINUED | OUTPATIENT
Start: 2024-09-06 | End: 2024-09-06

## 2024-09-06 RX ORDER — LIDOCAINE HYDROCHLORIDE 10 MG/ML
INJECTION INFILTRATION; PERINEURAL AS NEEDED
Status: DISCONTINUED | OUTPATIENT
Start: 2024-09-06 | End: 2024-09-06

## 2024-09-06 RX ORDER — FENTANYL CITRATE 50 UG/ML
50 INJECTION, SOLUTION INTRAMUSCULAR; INTRAVENOUS EVERY 5 MIN PRN
Status: DISCONTINUED | OUTPATIENT
Start: 2024-09-06 | End: 2024-09-06 | Stop reason: HOSPADM

## 2024-09-06 RX ORDER — FENTANYL CITRATE 50 UG/ML
25 INJECTION, SOLUTION INTRAMUSCULAR; INTRAVENOUS EVERY 5 MIN PRN
Status: DISCONTINUED | OUTPATIENT
Start: 2024-09-06 | End: 2024-09-06 | Stop reason: HOSPADM

## 2024-09-06 RX ORDER — SODIUM CHLORIDE, SODIUM LACTATE, POTASSIUM CHLORIDE, CALCIUM CHLORIDE 600; 310; 30; 20 MG/100ML; MG/100ML; MG/100ML; MG/100ML
100 INJECTION, SOLUTION INTRAVENOUS CONTINUOUS
Status: DISCONTINUED | OUTPATIENT
Start: 2024-09-06 | End: 2024-09-06 | Stop reason: HOSPADM

## 2024-09-06 RX ORDER — ONDANSETRON HYDROCHLORIDE 2 MG/ML
4 INJECTION, SOLUTION INTRAVENOUS ONCE AS NEEDED
Status: DISCONTINUED | OUTPATIENT
Start: 2024-09-06 | End: 2024-09-06 | Stop reason: HOSPADM

## 2024-09-06 RX ORDER — MEPERIDINE HYDROCHLORIDE 25 MG/ML
12.5 INJECTION INTRAMUSCULAR; INTRAVENOUS; SUBCUTANEOUS EVERY 10 MIN PRN
Status: DISCONTINUED | OUTPATIENT
Start: 2024-09-06 | End: 2024-09-06 | Stop reason: HOSPADM

## 2024-09-06 RX ORDER — MIDAZOLAM HYDROCHLORIDE 1 MG/ML
INJECTION, SOLUTION INTRAMUSCULAR; INTRAVENOUS AS NEEDED
Status: DISCONTINUED | OUTPATIENT
Start: 2024-09-06 | End: 2024-09-06

## 2024-09-06 RX ORDER — OXYCODONE HYDROCHLORIDE 5 MG/1
5 TABLET ORAL EVERY 4 HOURS PRN
Status: DISCONTINUED | OUTPATIENT
Start: 2024-09-06 | End: 2024-09-06 | Stop reason: HOSPADM

## 2024-09-06 RX ORDER — OXYCODONE HYDROCHLORIDE 5 MG/1
5 TABLET ORAL EVERY 6 HOURS PRN
Qty: 16 TABLET | Refills: 0 | Status: SHIPPED | OUTPATIENT
Start: 2024-09-06 | End: 2024-09-10

## 2024-09-06 RX ORDER — CEFAZOLIN SODIUM 1 G/50ML
1 SOLUTION INTRAVENOUS ONCE
Status: DISCONTINUED | OUTPATIENT
Start: 2024-09-06 | End: 2024-09-06

## 2024-09-06 RX ORDER — PROPOFOL 10 MG/ML
INJECTION, EMULSION INTRAVENOUS AS NEEDED
Status: DISCONTINUED | OUTPATIENT
Start: 2024-09-06 | End: 2024-09-06

## 2024-09-06 RX ORDER — CEFAZOLIN SODIUM IN 0.9 % NACL 3 G/100 ML
3 INTRAVENOUS SOLUTION, PIGGYBACK (ML) INTRAVENOUS ONCE
Status: DISCONTINUED | OUTPATIENT
Start: 2024-09-06 | End: 2024-09-06 | Stop reason: HOSPADM

## 2024-09-06 RX ORDER — CEFAZOLIN 1 G/1
INJECTION, POWDER, FOR SOLUTION INTRAVENOUS AS NEEDED
Status: DISCONTINUED | OUTPATIENT
Start: 2024-09-06 | End: 2024-09-06

## 2024-09-06 RX ORDER — CEFAZOLIN SODIUM 2 G/100ML
2 INJECTION, SOLUTION INTRAVENOUS ONCE
Status: DISCONTINUED | OUTPATIENT
Start: 2024-09-06 | End: 2024-09-06

## 2024-09-06 RX ORDER — BUPIVACAINE HYDROCHLORIDE 5 MG/ML
INJECTION, SOLUTION PERINEURAL AS NEEDED
Status: DISCONTINUED | OUTPATIENT
Start: 2024-09-06 | End: 2024-09-06 | Stop reason: HOSPADM

## 2024-09-06 RX ORDER — LIDOCAINE HYDROCHLORIDE AND EPINEPHRINE 10; 10 MG/ML; UG/ML
INJECTION, SOLUTION INFILTRATION; PERINEURAL AS NEEDED
Status: DISCONTINUED | OUTPATIENT
Start: 2024-09-06 | End: 2024-09-06 | Stop reason: HOSPADM

## 2024-09-06 RX ORDER — SODIUM CHLORIDE, SODIUM LACTATE, POTASSIUM CHLORIDE, CALCIUM CHLORIDE 600; 310; 30; 20 MG/100ML; MG/100ML; MG/100ML; MG/100ML
20 INJECTION, SOLUTION INTRAVENOUS CONTINUOUS
Status: DISCONTINUED | OUTPATIENT
Start: 2024-09-06 | End: 2024-09-06 | Stop reason: HOSPADM

## 2024-09-06 RX ORDER — ROCURONIUM BROMIDE 10 MG/ML
INJECTION, SOLUTION INTRAVENOUS AS NEEDED
Status: DISCONTINUED | OUTPATIENT
Start: 2024-09-06 | End: 2024-09-06

## 2024-09-06 ASSESSMENT — PAIN DESCRIPTION - DESCRIPTORS
DESCRIPTORS: BURNING;PRESSURE
DESCRIPTORS: SHARP

## 2024-09-06 ASSESSMENT — COLUMBIA-SUICIDE SEVERITY RATING SCALE - C-SSRS
1. IN THE PAST MONTH, HAVE YOU WISHED YOU WERE DEAD OR WISHED YOU COULD GO TO SLEEP AND NOT WAKE UP?: NO
6. HAVE YOU EVER DONE ANYTHING, STARTED TO DO ANYTHING, OR PREPARED TO DO ANYTHING TO END YOUR LIFE?: NO
2. HAVE YOU ACTUALLY HAD ANY THOUGHTS OF KILLING YOURSELF?: NO

## 2024-09-06 ASSESSMENT — PAIN SCALES - GENERAL
PAINLEVEL_OUTOF10: 6
PAINLEVEL_OUTOF10: 4
PAINLEVEL_OUTOF10: 5 - MODERATE PAIN
PAINLEVEL_OUTOF10: 9
PAINLEVEL_OUTOF10: 7
PAINLEVEL_OUTOF10: 5 - MODERATE PAIN
PAINLEVEL_OUTOF10: 0 - NO PAIN

## 2024-09-06 ASSESSMENT — PAIN - FUNCTIONAL ASSESSMENT
PAIN_FUNCTIONAL_ASSESSMENT: 0-10

## 2024-09-06 ASSESSMENT — PAIN SCALES - PAIN ASSESSMENT IN ADVANCED DEMENTIA (PAINAD)
TOTALSCORE: MEDICATION (SEE MAR)

## 2024-09-06 ASSESSMENT — PAIN DESCRIPTION - ORIENTATION: ORIENTATION: MID

## 2024-09-06 NOTE — OP NOTE
Incision and Drainage Perineum **PAT ON ADMIT** (B) Operative Note     Date: 2024  OR Location: [facility]    Name: Mu Escalante, : 1970, Age: 53 y.o., MRN: [id number], Sex: male    Diagnosis  Pre-op Diagnosis      * Abscess of scrotum [N49.2] Post-op Diagnosis     * Abscess of scrotum [N49.2]     Procedures  Incision and Drainage Perineum **PAT ON ADMIT**  33076 - MI I&D VULVA/PERINEAL ABSCESS      Surgeons      * Shailesh Peñaloza V - Primary    Resident/Fellow/Other Assistant:  Surgeons and Role:     * Elizabeth Eagle PA-C - Assisting    Procedure Summary  Anesthesia: General  ASA: III  Anesthesia Staff: Anesthesiologist: James Villarreal MD; Anthony Walden MD  CRNA: TANVIR Riley; TANVIR Nunez, BERNARDINO  SRNA: Debbie Abdi  Estimated Blood Loss: 10 mL  Intra-op Medications: Administrations occurring from 1345 to 1515 on 24:  * No intraprocedure medications in log *           Anesthesia Record               Intraprocedure I/O Totals          Intake    Dexmedetomidine 0.00 mL    The total shown is the total volume documented since Anesthesia Start was filed.    Total Intake 0 mL          Specimen:   ID Type Source Tests Collected by Time   A : perineal abscess Swab ABSCESS TISSUE/WOUND CULTURE/SMEAR Shailesh BEJARANO MD 2024 1407        Staff:   Circulator: Flor  Circulator: Louise Hines Person: Yoanna  Scrub Person: Paige        Findings: Perineal cyst x 2    Indications: Mu Escalante is an 53 y.o. male who is having surgery for Abscess of scrotum [N49.2].  The patient presents to my clinic for evaluation of an infected perineal cyst.  He had been having drainage from the area for the last 2 weeks now.  We sent him for a CT scan, which showed some inflammation of the skin, but no large abscess.  I explained to him the risks and benefits of going to operating room for a perineal cyst incision and drainage.  These risks included the risk of bleeding,  infection, or injury to surround structures.  The patient agreed to proceed with the operation.    The patient was seen in the preoperative area. The risks, benefits, complications, treatment options, non-operative alternatives, expected recovery and outcomes were discussed with the patient. The possibilities of reaction to medication, pulmonary aspiration, injury to surrounding structures, bleeding, recurrent infection, the need for additional procedures, failure to diagnose a condition, and creating a complication requiring transfusion or operation were discussed with the patient. The patient concurred with the proposed plan, giving informed consent.  The site of surgery was properly noted/marked if necessary per policy. The patient has been actively warmed in preoperative area. Preoperative antibiotics have been ordered and given within 1 hours of incision. Venous thrombosis prophylaxis have been ordered including bilateral sequential compression devices    Procedure Details:   The patient was brought to the operating room, placed supine on operative table, and general endotracheal esthesia was induced.  The patient was then placed into the lithotomy position.  We made sure to pad his pressure points along his knees, calfs, ankle, and feet.  We then prepped and draped his perineal area with Betadine.  He was noted to have 2 cystic areas along his perineum.  We use electrocautery to open up both areas.  We got some serous fluid as well as a little bit of blood from each I&D site.  There was not much pus.  We then irrigated each wound, and achieved hemostasis.  We packed each wound with quarter inch packing gauze.  We then cover the wound with gauze followed by ABD pads, and mesh underwear.  At the end of the procedure all needle, sponge, and instrument counts were correct.  The patient tolerated the procedure well and was brought to the postanesthesia care unit for recovery.      Complications:  None; patient  tolerated the procedure well.    Disposition: PACU - hemodynamically stable.  Condition: stable       Attending Attestation: I was present and scrubbed for the entire procedure.    Shailesh Peñaloza V  Phone Number: [phone]

## 2024-09-06 NOTE — PERIOPERATIVE NURSING NOTE
ANCEF DOSE CHANGED PREOP D/T WT BASED CRITERIA-ANCEF 2 GMS FROM  ACCUDOSE - OR TO PREPARE ANCE 1 GM INTRAOP

## 2024-09-06 NOTE — ANESTHESIA POSTPROCEDURE EVALUATION
Patient: Mu Escalante    Procedure Summary       Date: 09/06/24 Room / Location: [facility] [place] OR 11 / Virtual [facility]    Anesthesia Start: 1343 Anesthesia Stop: 1435    Procedure: Incision and Drainage Perineum **PAT ON ADMIT** (Bilateral: Perineum) Diagnosis:       Abscess of scrotum      (Abscess of scrotum [N49.2])    Surgeons: Shailesh BEJARANO MD Responsible Provider: James Villarreal MD    Anesthesia Type: general ASA Status: 3            Anesthesia Type: general    Vitals Value Taken Time   /85 09/06/24 1502   Temp 36.1 09/06/24 1505   Pulse 73 09/06/24 1504   Resp 19 09/06/24 1504   SpO2 94 % 09/06/24 1504   Vitals shown include unfiled device data.    Anesthesia Post Evaluation    Patient location during evaluation: PACU  Patient participation: complete - patient participated  Level of consciousness: awake  Pain management: adequate  Airway patency: patent  Cardiovascular status: acceptable  Respiratory status: acceptable  Hydration status: acceptable  Postoperative Nausea and Vomiting: none        There were no known notable events for this encounter.

## 2024-09-06 NOTE — ANESTHESIA PROCEDURE NOTES
Airway  Date/Time: 9/6/2024 1:53 PM  Urgency: elective    Airway not difficult    Staffing  Performed: attending   Authorized by: James Villarreal MD    Performed by: Anthony Walden MD  Patient location during procedure: OR    Indications and Patient Condition  Indications for airway management: anesthesia and airway protection  Spontaneous Ventilation: absent  Sedation level: deep  Preoxygenated: yes  Mask difficulty assessment: 2 - vent by mask + OA or adjuvant +/- NMBA    Final Airway Details  Final airway type: endotracheal airway      Successful airway: ETT  Cuffed: yes   Successful intubation technique: video laryngoscopy  Blade: Amadou  Blade size: #4  ETT size (mm): 8.0  Cormack-Lehane Classification: grade I - full view of glottis  Placement verified by: capnometry   Cuff volume (mL): 8  Measured from: lips  ETT to lips (cm): 21  Number of attempts at approach: 2    Additional Comments  First attempt was SRNA, difficult visualization  Second attempt successful by me

## 2024-09-06 NOTE — ANESTHESIA PREPROCEDURE EVALUATION
Patient: Mu Escalante    Procedure Information       Date/Time: 09/06/24 5200    Procedure: Incision and Drainage Perineum **PAT ON ADMIT** (Bilateral)    Location: [facility] 11 / [facility]    Surgeons: Shailesh BEJARANO MD            Relevant Problems   Cardiac   (+) Essential hypertension   (+) Hyperlipidemia      Pulmonary   (+) Asthma (HHS-HCC)      Neuro   (+) Polyneuropathy      /Renal   (+) Benign prostatic hyperplasia with urinary obstruction      Endocrine   (+) Morbid obesity (Multi)      Musculoskeletal   (+) DJD (degenerative joint disease) of knee      ID   (+) Tinea pedis   (+) URI, acute       Clinical information reviewed:   Tobacco  Allergies  Meds   Med Hx  Surg Hx   Fam Hx  Soc Hx        NPO Detail:  No data recorded     Physical Exam    Airway  Mallampati: III  TM distance: >3 FB  Neck ROM: full     Cardiovascular    Dental    Pulmonary    Abdominal            Anesthesia Plan    History of general anesthesia?: yes  History of complications of general anesthesia?: no    ASA 3     general     intravenous induction   Anesthetic plan and risks discussed with patient.    Plan discussed with CRNA.

## 2024-09-06 NOTE — PERIOPERATIVE NURSING NOTE
1550--Discharge, medication and wound care instructions reviewed with patient.  No questions at this time.    1605--Patient refused a sitz bath for home.  He will just take a regular bath.  Patient is ready for discharge just waiting for ride that should be here in next 10-15 minutes.

## 2024-09-08 LAB
BACTERIA SPEC CULT: NORMAL
GRAM STN SPEC: NORMAL
GRAM STN SPEC: NORMAL

## 2024-09-10 ASSESSMENT — PAIN SCALES - GENERAL: PAINLEVEL_OUTOF10: 3

## 2024-09-16 ENCOUNTER — OFFICE VISIT (OUTPATIENT)
Dept: PRIMARY CARE | Facility: CLINIC | Age: 54
End: 2024-09-16
Payer: COMMERCIAL

## 2024-09-16 VITALS
SYSTOLIC BLOOD PRESSURE: 178 MMHG | HEIGHT: 78 IN | DIASTOLIC BLOOD PRESSURE: 98 MMHG | BODY MASS INDEX: 36.45 KG/M2 | WEIGHT: 315 LBS | HEART RATE: 81 BPM | OXYGEN SATURATION: 96 %

## 2024-09-16 DIAGNOSIS — Z00.00 PHYSICAL EXAM: Primary | ICD-10-CM

## 2024-09-16 DIAGNOSIS — J45.909 PERSISTENT ASTHMA WITHOUT COMPLICATION, UNSPECIFIED ASTHMA SEVERITY (HHS-HCC): ICD-10-CM

## 2024-09-16 DIAGNOSIS — K58.0 IRRITABLE BOWEL SYNDROME WITH DIARRHEA: ICD-10-CM

## 2024-09-16 DIAGNOSIS — I10 ESSENTIAL HYPERTENSION: ICD-10-CM

## 2024-09-16 DIAGNOSIS — N40.1 BPH ASSOCIATED WITH NOCTURIA: ICD-10-CM

## 2024-09-16 DIAGNOSIS — R35.1 BPH ASSOCIATED WITH NOCTURIA: ICD-10-CM

## 2024-09-16 DIAGNOSIS — Z87.891 HISTORY OF TOBACCO ABUSE: ICD-10-CM

## 2024-09-16 DIAGNOSIS — Z12.5 SCREENING FOR PROSTATE CANCER: ICD-10-CM

## 2024-09-16 PROCEDURE — 99396 PREV VISIT EST AGE 40-64: CPT | Performed by: PHYSICIAN ASSISTANT

## 2024-09-16 PROCEDURE — 3077F SYST BP >= 140 MM HG: CPT | Performed by: PHYSICIAN ASSISTANT

## 2024-09-16 PROCEDURE — 3080F DIAST BP >= 90 MM HG: CPT | Performed by: PHYSICIAN ASSISTANT

## 2024-09-16 PROCEDURE — 3008F BODY MASS INDEX DOCD: CPT | Performed by: PHYSICIAN ASSISTANT

## 2024-09-16 RX ORDER — DICYCLOMINE HYDROCHLORIDE 10 MG/1
20 CAPSULE ORAL 3 TIMES DAILY PRN
COMMUNITY
Start: 2024-09-01

## 2024-09-16 RX ORDER — TAMSULOSIN HYDROCHLORIDE 0.4 MG/1
0.4 CAPSULE ORAL DAILY
Qty: 90 CAPSULE | Refills: 1 | Status: SHIPPED | OUTPATIENT
Start: 2024-09-16

## 2024-09-16 RX ORDER — ALBUTEROL SULFATE 90 UG/1
2 INHALANT RESPIRATORY (INHALATION) EVERY 4 HOURS PRN
Qty: 8.5 G | Refills: 1 | Status: SHIPPED | OUTPATIENT
Start: 2024-09-16

## 2024-09-16 ASSESSMENT — PAIN SCALES - GENERAL: PAINLEVEL: 0-NO PAIN

## 2024-09-16 NOTE — PROGRESS NOTES
"Subjective   Patient ID: Mu Escalante is a 54 y.o. male who presents for Annual Exam (Discuss prostate /la).    55 y/o male seen for RPE.   Has had recent surgery for scrotal abscess and normal recovery. Has had BP elevation today - yet to take medication.   Due for routine labs - reports some prostate symptoms. Weak stream, nocturia the last several months.   Also with loose stools/diarrhea for the last several months as well. Occurs after eating. Was prescribed dicyclomine, yet to try.     Currently without lung CA symptoms. Signs and symptoms of lung CA - no; willing to do lung cancer screenings.            Review of Systems   All other systems reviewed and are negative.      Objective   BP (!) 178/98   Pulse 81   Ht 1.981 m (6' 6\")   Wt (!) 177 kg (390 lb)   SpO2 96%   BMI 45.07 kg/m²     Physical Exam  Constitutional:       Appearance: Normal appearance. He is obese.   HENT:      Head: Normocephalic.      Right Ear: Tympanic membrane normal.      Left Ear: Tympanic membrane normal.      Mouth/Throat:      Pharynx: Oropharynx is clear.   Eyes:      Pupils: Pupils are equal, round, and reactive to light.   Cardiovascular:      Rate and Rhythm: Normal rate and regular rhythm.      Heart sounds: Normal heart sounds.   Pulmonary:      Breath sounds: Normal breath sounds.   Abdominal:      General: Bowel sounds are normal.   Musculoskeletal:      Cervical back: Normal range of motion.      Right lower leg: Edema present.   Skin:     Findings: No rash.   Neurological:      General: No focal deficit present.      Mental Status: He is alert.         Assessment/Plan   Diagnoses and all orders for this visit:  Physical exam  -     CBC and Auto Differential; Future  -     Comprehensive metabolic panel; Future  -     Lipid panel; Future  Screening for prostate cancer  -     Prostate Spec.Ag,Screen; Future  History of tobacco abuse  -     CT lung screening low dose; Future  BPH associated with nocturia  -     " tamsulosin (Flomax) 0.4 mg 24 hr capsule; Take 1 capsule (0.4 mg) by mouth once daily.  Persistent asthma without complication, unspecified asthma severity ([facility])  -     albuterol 90 mcg/actuation inhaler; Inhale 2 puffs every 4 hours if needed for wheezing.  Irritable bowel syndrome with diarrhea  Essential hypertension    Low FODMAP diet advised to start.   Follow up 2 months for BP check.

## 2024-09-25 ENCOUNTER — APPOINTMENT (OUTPATIENT)
Dept: SURGERY | Facility: CLINIC | Age: 54
End: 2024-09-25
Payer: COMMERCIAL

## 2024-09-26 ENCOUNTER — OFFICE VISIT (OUTPATIENT)
Dept: SURGERY | Facility: CLINIC | Age: 54
End: 2024-09-26
Payer: COMMERCIAL

## 2024-09-26 VITALS — HEIGHT: 78 IN | BODY MASS INDEX: 45.07 KG/M2 | TEMPERATURE: 97.8 F

## 2024-09-26 DIAGNOSIS — L02.91 ABSCESS: Primary | ICD-10-CM

## 2024-09-26 PROCEDURE — 99024 POSTOP FOLLOW-UP VISIT: CPT | Performed by: SURGERY

## 2024-09-26 ASSESSMENT — ENCOUNTER SYMPTOMS
CONFUSION: 0
ACTIVITY CHANGE: 0
AGITATION: 0
APPETITE CHANGE: 0
CHILLS: 0
FEVER: 0
DIAPHORESIS: 0

## 2024-09-26 NOTE — PROGRESS NOTES
Subjective   Patient ID: Mu Escalante is a 54 y.o. male who presents for Post-op.    The patient presents for 2-week follow-up from a perineal abscess incision and drainage.  A few days after the surgery the patient was on a long distance trip to [place], where he sat in the car for 19 hours.  That night he developed what sounds like a large hematoma that again drained in the hotel bed.  Since then he has had some mild drainage of bloody fluid.  Otherwise no evidence of pus or infection.    Review of Systems   Constitutional:  Negative for activity change, appetite change, chills, diaphoresis and fever.   Psychiatric/Behavioral:  Negative for agitation and confusion.    All other systems reviewed and are negative.      Objective   Physical Exam  Genitourinary:     Comments: Perineal incision and drainage sites are clean and dry.  No fluctuance or erythema.  No signs of infection.        Assessment/Plan   The patient is a 54-year-old man who presents for 2-week follow-up from a perineal abscess incision and drainage.  He went on a long distance trip to [place] for work, and drove 19 hours.  He developed a hematoma that drained in the hotel bed.  Today on exam I do not note any further fluctuance or erythema.  No signs of infection.  I suspect that he likely got the hematoma from the long drive so close to being after surgery.  I advised him to be careful about sitting for long periods of time.  The area should continue to heal.  I also suspect that he likely has hidradenitis as the cause of his multiple abscesses in his groins and perineum.  Advised him to continue using the Hibiclens to help with the recurrent infections.         Shailesh Peñaloza MD 09/26/24 9:54 AM

## 2024-10-03 ENCOUNTER — HOSPITAL ENCOUNTER (OUTPATIENT)
Dept: RADIOLOGY | Facility: CLINIC | Age: 54
End: 2024-10-03
Payer: COMMERCIAL

## 2024-11-12 DIAGNOSIS — E78.5 DYSLIPIDEMIA: ICD-10-CM

## 2024-11-12 RX ORDER — ROSUVASTATIN CALCIUM 20 MG/1
20 TABLET, COATED ORAL DAILY
Qty: 90 TABLET | Refills: 1 | Status: SHIPPED | OUTPATIENT
Start: 2024-11-12

## 2024-12-06 ENCOUNTER — OFFICE VISIT (OUTPATIENT)
Dept: PRIMARY CARE | Facility: CLINIC | Age: 54
End: 2024-12-06
Payer: COMMERCIAL

## 2024-12-06 VITALS
HEART RATE: 88 BPM | SYSTOLIC BLOOD PRESSURE: 154 MMHG | BODY MASS INDEX: 36.45 KG/M2 | OXYGEN SATURATION: 95 % | HEIGHT: 78 IN | DIASTOLIC BLOOD PRESSURE: 88 MMHG | WEIGHT: 315 LBS

## 2024-12-06 DIAGNOSIS — L02.31 ABSCESS, GLUTEAL: Primary | ICD-10-CM

## 2024-12-06 PROCEDURE — 3077F SYST BP >= 140 MM HG: CPT | Performed by: PHYSICIAN ASSISTANT

## 2024-12-06 PROCEDURE — 10060 I&D ABSCESS SIMPLE/SINGLE: CPT | Performed by: PHYSICIAN ASSISTANT

## 2024-12-06 PROCEDURE — 87070 CULTURE OTHR SPECIMN AEROBIC: CPT | Performed by: PHYSICIAN ASSISTANT

## 2024-12-06 PROCEDURE — 99214 OFFICE O/P EST MOD 30 MIN: CPT | Performed by: PHYSICIAN ASSISTANT

## 2024-12-06 PROCEDURE — 3008F BODY MASS INDEX DOCD: CPT | Performed by: PHYSICIAN ASSISTANT

## 2024-12-06 PROCEDURE — 3079F DIAST BP 80-89 MM HG: CPT | Performed by: PHYSICIAN ASSISTANT

## 2024-12-06 RX ORDER — AMOXICILLIN AND CLAVULANATE POTASSIUM 875; 125 MG/1; MG/1
875 TABLET, FILM COATED ORAL 2 TIMES DAILY
Qty: 20 TABLET | Refills: 0 | Status: SHIPPED | OUTPATIENT
Start: 2024-12-06 | End: 2024-12-16

## 2024-12-06 ASSESSMENT — PAIN SCALES - GENERAL: PAINLEVEL_OUTOF10: 10-WORST PAIN EVER

## 2024-12-06 NOTE — PROGRESS NOTES
"Subjective   Patient ID: Mu Escalante is a 54 y.o. male who presents for possible abscess  (Patient states the abscess has been there for three days. ).    Mu is seen for possible abscess to gluteal region. Onset a few days ago. Pain is very high - denies drainage, denies fever. Has not had this before but has had groin abscesses.          Review of Systems   All other systems reviewed and are negative.      Objective   /88   Pulse 88   Ht 1.981 m (6' 6\")   Wt (!) 174 kg (384 lb)   SpO2 95%   BMI 44.38 kg/m²     Physical Exam  Constitutional:       Appearance: Normal appearance. He is obese.   Skin:            Comments: R gluteal fold with region of erythema, central fluctuance.    Neurological:      Mental Status: He is alert.     Incision and Drainage Procedure Note  Diagnosis: large abscess  Location: see diagram  Informed Consent: Discussed risks (permanent scarring, light or dark discoloration, infection, pain, bleeding, bruising, redness, damage to adjacent structures, and recurrence of the lesion) and benefits of the procedure, as well as the alternatives.  Informed consent was obtained.  Preparation: The area was prepared and draped in a standard fashion.  Anesthesia: Lidocaine 1% with epinephrine 4ml  Procedure Details: An incision was made overlying the lesion with #15 blade. The lesion drained pus and blood.   A large amount of fluid was drained.     A sterile pressure dressing was applied. The patient tolerated procedure well.  Total number of lesions drained: 1  Plan: The patient was instructed on post-op care. Recommend OTC analgesia as needed for pain.      Assessment/Plan   Diagnoses and all orders for this visit:  Abscess, gluteal  -     amoxicillin-pot clavulanate (Augmentin) 875-125 mg tablet; Take 1 tablet (875 mg) by mouth 2 times a day for 10 days.    1 week follow up       "

## 2024-12-08 LAB
BACTERIA SPEC CULT: ABNORMAL
GRAM STN SPEC: ABNORMAL
GRAM STN SPEC: ABNORMAL

## 2024-12-09 LAB
B-LACTAMASE ORGANISM ISLT: POSITIVE
BACTERIA SPEC CULT: ABNORMAL
BACTERIA SPEC CULT: ABNORMAL
GRAM STN SPEC: ABNORMAL
GRAM STN SPEC: ABNORMAL

## 2024-12-12 ENCOUNTER — TELEPHONE (OUTPATIENT)
Dept: PRIMARY CARE | Facility: CLINIC | Age: 54
End: 2024-12-12
Payer: COMMERCIAL

## 2024-12-14 DIAGNOSIS — I10 ESSENTIAL HYPERTENSION, BENIGN: ICD-10-CM

## 2024-12-16 DIAGNOSIS — J45.909 PERSISTENT ASTHMA WITHOUT COMPLICATION, UNSPECIFIED ASTHMA SEVERITY (HHS-HCC): ICD-10-CM

## 2024-12-16 RX ORDER — NEBIVOLOL 10 MG/1
10 TABLET ORAL DAILY
Qty: 90 TABLET | Refills: 3 | Status: SHIPPED | OUTPATIENT
Start: 2024-12-16

## 2024-12-16 RX ORDER — LISINOPRIL AND HYDROCHLOROTHIAZIDE 12.5; 2 MG/1; MG/1
1 TABLET ORAL DAILY
Qty: 90 TABLET | Refills: 3 | Status: SHIPPED | OUTPATIENT
Start: 2024-12-16

## 2024-12-17 RX ORDER — ALBUTEROL SULFATE 90 UG/1
2 INHALANT RESPIRATORY (INHALATION) EVERY 4 HOURS PRN
Qty: 8.5 G | Refills: 1 | Status: SHIPPED | OUTPATIENT
Start: 2024-12-17

## 2024-12-17 RX ORDER — DILTIAZEM HYDROCHLORIDE 60 MG/1
2 TABLET, FILM COATED ORAL EVERY 12 HOURS
Qty: 30.6 G | Refills: 3 | Status: SHIPPED | OUTPATIENT
Start: 2024-12-17

## 2025-01-01 ENCOUNTER — APPOINTMENT (OUTPATIENT)
Dept: RADIOLOGY | Facility: HOSPITAL | Age: 55
End: 2025-01-01
Payer: COMMERCIAL

## 2025-01-01 ENCOUNTER — APPOINTMENT (OUTPATIENT)
Dept: CARDIOLOGY | Facility: HOSPITAL | Age: 55
End: 2025-01-01
Payer: COMMERCIAL

## 2025-01-01 ENCOUNTER — SURGERY (OUTPATIENT)
Age: 55
End: 2025-01-01
Payer: COMMERCIAL

## 2025-01-01 ENCOUNTER — APPOINTMENT (OUTPATIENT)
Dept: NEUROLOGY | Facility: HOSPITAL | Age: 55
End: 2025-01-01
Payer: COMMERCIAL

## 2025-01-01 ENCOUNTER — HOSPITAL ENCOUNTER (INPATIENT)
Facility: HOSPITAL | Age: 55
LOS: 4 days | End: 2025-04-25
Attending: EMERGENCY MEDICINE | Admitting: INTERNAL MEDICINE
Payer: COMMERCIAL

## 2025-01-01 VITALS
OXYGEN SATURATION: 96 % | RESPIRATION RATE: 28 BRPM | WEIGHT: 315 LBS | SYSTOLIC BLOOD PRESSURE: 105 MMHG | BODY MASS INDEX: 38.36 KG/M2 | HEIGHT: 76 IN | DIASTOLIC BLOOD PRESSURE: 77 MMHG | TEMPERATURE: 100.2 F | HEART RATE: 106 BPM

## 2025-01-01 DIAGNOSIS — J18.9 PNEUMONIA OF BOTH LUNGS DUE TO INFECTIOUS ORGANISM, UNSPECIFIED PART OF LUNG: ICD-10-CM

## 2025-01-01 DIAGNOSIS — I62.9 INTRACRANIAL BLEED (MULTI): Primary | ICD-10-CM

## 2025-01-01 DIAGNOSIS — J96.01 ACUTE RESPIRATORY FAILURE WITH HYPOXIA: ICD-10-CM

## 2025-01-01 LAB
ABO GROUP (TYPE) IN BLOOD: NORMAL
ALBUMIN SERPL BCP-MCNC: 2.6 G/DL (ref 3.4–5)
ALBUMIN SERPL BCP-MCNC: 2.8 G/DL (ref 3.4–5)
ALBUMIN SERPL BCP-MCNC: 2.8 G/DL (ref 3.4–5)
ALBUMIN SERPL BCP-MCNC: 2.9 G/DL (ref 3.4–5)
ALBUMIN SERPL BCP-MCNC: 2.9 G/DL (ref 3.4–5)
ALBUMIN SERPL BCP-MCNC: 3.1 G/DL (ref 3.4–5)
ALBUMIN SERPL BCP-MCNC: 3.1 G/DL (ref 3.4–5)
ALBUMIN SERPL BCP-MCNC: 3.3 G/DL (ref 3.4–5)
ALBUMIN SERPL BCP-MCNC: 3.6 G/DL (ref 3.4–5)
ALBUMIN SERPL BCP-MCNC: 4 G/DL (ref 3.4–5)
ALP SERPL-CCNC: 53 U/L (ref 33–120)
ALP SERPL-CCNC: 54 U/L (ref 33–120)
ALP SERPL-CCNC: 57 U/L (ref 33–120)
ALP SERPL-CCNC: 63 U/L (ref 33–120)
ALP SERPL-CCNC: 65 U/L (ref 33–120)
ALP SERPL-CCNC: 78 U/L (ref 33–120)
ALP SERPL-CCNC: 83 U/L (ref 33–120)
ALP SERPL-CCNC: 84 U/L (ref 33–120)
ALT SERPL W P-5'-P-CCNC: 14 U/L (ref 10–52)
ALT SERPL W P-5'-P-CCNC: 15 U/L (ref 10–52)
ALT SERPL W P-5'-P-CCNC: 17 U/L (ref 10–52)
ALT SERPL W P-5'-P-CCNC: 19 U/L (ref 10–52)
ALT SERPL W P-5'-P-CCNC: 19 U/L (ref 10–52)
ALT SERPL W P-5'-P-CCNC: 23 U/L (ref 10–52)
ALT SERPL W P-5'-P-CCNC: 27 U/L (ref 10–52)
ALT SERPL W P-5'-P-CCNC: 38 U/L (ref 10–52)
AMORPH CRY #/AREA UR COMP ASSIST: NORMAL /HPF
AMPHETAMINES UR QL SCN: NORMAL
AMYLASE SERPL-CCNC: 20 U/L (ref 29–103)
AMYLASE SERPL-CCNC: 24 U/L (ref 29–103)
AMYLASE SERPL-CCNC: 25 U/L (ref 29–103)
ANION GAP BLDA CALCULATED.4IONS-SCNC: 10 MMO/L (ref 10–25)
ANION GAP BLDA CALCULATED.4IONS-SCNC: 3 MMO/L (ref 10–25)
ANION GAP BLDA CALCULATED.4IONS-SCNC: 4 MMO/L (ref 10–25)
ANION GAP BLDA CALCULATED.4IONS-SCNC: 4 MMO/L (ref 10–25)
ANION GAP BLDA CALCULATED.4IONS-SCNC: 5 MMO/L (ref 10–25)
ANION GAP BLDA CALCULATED.4IONS-SCNC: 5 MMO/L (ref 10–25)
ANION GAP BLDA CALCULATED.4IONS-SCNC: 7 MMO/L (ref 10–25)
ANION GAP BLDA CALCULATED.4IONS-SCNC: 7 MMO/L (ref 10–25)
ANION GAP BLDA CALCULATED.4IONS-SCNC: 8 MMO/L (ref 10–25)
ANION GAP SERPL CALCULATED.3IONS-SCNC: 11 MMOL/L (ref 10–20)
ANION GAP SERPL CALCULATED.3IONS-SCNC: 12 MMOL/L (ref 10–20)
ANION GAP SERPL CALCULATED.3IONS-SCNC: 13 MMOL/L (ref 10–20)
ANION GAP SERPL CALCULATED.3IONS-SCNC: 15 MMOL/L (ref 10–20)
ANION GAP SERPL CALCULATED.3IONS-SCNC: 7 MMOL/L (ref 10–20)
ANION GAP SERPL CALCULATED.3IONS-SCNC: 8 MMOL/L (ref 10–20)
ANION GAP SERPL CALCULATED.3IONS-SCNC: 9 MMOL/L (ref 10–20)
ANION GAP SERPL CALCULATED.3IONS-SCNC: 9 MMOL/L (ref 10–20)
ANTIBODY SCREEN: NORMAL
ANTIBODY SCREEN: NORMAL
APAP SERPL-MCNC: <10 UG/ML (ref ?–30)
APPARATUS: ABNORMAL
APPEARANCE UR: ABNORMAL
APPEARANCE UR: CLEAR
APTT PPP: 21.4 SECONDS (ref 22–32.5)
APTT PPP: 23.8 SECONDS (ref 22–32.5)
APTT PPP: 25.6 SECONDS (ref 22–32.5)
APTT PPP: 27.4 SECONDS (ref 22–32.5)
APTT PPP: 27.5 SECONDS (ref 22–32.5)
APTT PPP: 28.1 SECONDS (ref 22–32.5)
APTT PPP: 28.5 SECONDS (ref 22–32.5)
ARTERIAL PATENCY WRIST A: ABNORMAL
ARTERIAL PATENCY WRIST A: NEGATIVE
ARTERIAL PATENCY WRIST A: POSITIVE
AST SERPL W P-5'-P-CCNC: 11 U/L (ref 9–39)
AST SERPL W P-5'-P-CCNC: 12 U/L (ref 9–39)
AST SERPL W P-5'-P-CCNC: 15 U/L (ref 9–39)
AST SERPL W P-5'-P-CCNC: 18 U/L (ref 9–39)
AST SERPL W P-5'-P-CCNC: 18 U/L (ref 9–39)
AST SERPL W P-5'-P-CCNC: 27 U/L (ref 9–39)
AST SERPL W P-5'-P-CCNC: 29 U/L (ref 9–39)
AST SERPL W P-5'-P-CCNC: 37 U/L (ref 9–39)
ATRIAL RATE: 107 BPM
BACTERIA #/AREA URNS AUTO: ABNORMAL /HPF
BARBITURATES UR QL SCN: NORMAL
BASE EXCESS BLDA CALC-SCNC: -0.2 MMOL/L (ref -2–3)
BASE EXCESS BLDA CALC-SCNC: -0.8 MMOL/L (ref -2–3)
BASE EXCESS BLDA CALC-SCNC: -2.5 MMOL/L (ref -2–3)
BASE EXCESS BLDA CALC-SCNC: 0.1 MMOL/L (ref -2–3)
BASE EXCESS BLDA CALC-SCNC: 1.1 MMOL/L (ref -2–3)
BASE EXCESS BLDA CALC-SCNC: 2.5 MMOL/L (ref -2–3)
BASE EXCESS BLDA CALC-SCNC: 2.8 MMOL/L (ref -2–3)
BASE EXCESS BLDA CALC-SCNC: 2.9 MMOL/L (ref -2–3)
BASE EXCESS BLDA CALC-SCNC: 2.9 MMOL/L (ref -2–3)
BASE EXCESS BLDA CALC-SCNC: 3 MMOL/L (ref -2–3)
BASE EXCESS BLDA CALC-SCNC: 3.1 MMOL/L (ref -2–3)
BASOPHILS # BLD AUTO: 0.11 X10*3/UL (ref 0–0.1)
BASOPHILS NFR BLD AUTO: 0.6 %
BENZODIAZ UR QL SCN: NORMAL
BILIRUB DIRECT SERPL-MCNC: 0.2 MG/DL (ref 0–0.3)
BILIRUB DIRECT SERPL-MCNC: 0.2 MG/DL (ref 0–0.3)
BILIRUB DIRECT SERPL-MCNC: 0.3 MG/DL (ref 0–0.3)
BILIRUB DIRECT SERPL-MCNC: 0.6 MG/DL (ref 0–0.3)
BILIRUB DIRECT SERPL-MCNC: 1.3 MG/DL (ref 0–0.3)
BILIRUB DIRECT SERPL-MCNC: 1.6 MG/DL (ref 0–0.3)
BILIRUB DIRECT SERPL-MCNC: 2 MG/DL (ref 0–0.3)
BILIRUB SERPL-MCNC: 0.5 MG/DL (ref 0–1.2)
BILIRUB SERPL-MCNC: 0.8 MG/DL (ref 0–1.2)
BILIRUB SERPL-MCNC: 1 MG/DL (ref 0–1.2)
BILIRUB SERPL-MCNC: 1.2 MG/DL (ref 0–1.2)
BILIRUB SERPL-MCNC: 1.6 MG/DL (ref 0–1.2)
BILIRUB SERPL-MCNC: 2.3 MG/DL (ref 0–1.2)
BILIRUB SERPL-MCNC: 3.2 MG/DL (ref 0–1.2)
BILIRUB SERPL-MCNC: 3.5 MG/DL (ref 0–1.2)
BILIRUB UR STRIP.AUTO-MCNC: ABNORMAL MG/DL
BILIRUB UR STRIP.AUTO-MCNC: NEGATIVE MG/DL
BLOOD EXPIRATION DATE: NORMAL
BODY TEMPERATURE: 37 DEGREES CELSIUS
BUN SERPL-MCNC: 14 MG/DL (ref 6–23)
BUN SERPL-MCNC: 16 MG/DL (ref 6–23)
BUN SERPL-MCNC: 17 MG/DL (ref 6–23)
BUN SERPL-MCNC: 18 MG/DL (ref 6–23)
BUN SERPL-MCNC: 20 MG/DL (ref 6–23)
BUN SERPL-MCNC: 22 MG/DL (ref 6–23)
BUN SERPL-MCNC: 22 MG/DL (ref 6–23)
BUN SERPL-MCNC: 23 MG/DL (ref 6–23)
BUN SERPL-MCNC: 26 MG/DL (ref 6–23)
BUN SERPL-MCNC: 32 MG/DL (ref 6–23)
BUN SERPL-MCNC: 39 MG/DL (ref 6–23)
BZE UR QL SCN: NORMAL
CA-I BLD-SCNC: 1.17 MMOL/L (ref 1.1–1.33)
CA-I BLD-SCNC: 1.21 MMOL/L (ref 1.1–1.33)
CA-I BLD-SCNC: 1.21 MMOL/L (ref 1.1–1.33)
CA-I BLD-SCNC: 1.22 MMOL/L (ref 1.1–1.33)
CA-I BLD-SCNC: 1.23 MMOL/L (ref 1.1–1.33)
CA-I BLD-SCNC: 1.24 MMOL/L (ref 1.1–1.33)
CA-I BLD-SCNC: 1.26 MMOL/L (ref 1.1–1.33)
CA-I BLDA-SCNC: 1.17 MMOL/L (ref 1.1–1.33)
CA-I BLDA-SCNC: 1.25 MMOL/L (ref 1.1–1.33)
CA-I BLDA-SCNC: 1.27 MMOL/L (ref 1.1–1.33)
CA-I BLDA-SCNC: 1.3 MMOL/L (ref 1.1–1.33)
CA-I BLDA-SCNC: 1.31 MMOL/L (ref 1.1–1.33)
CA-I BLDA-SCNC: 1.32 MMOL/L (ref 1.1–1.33)
CA-I BLDA-SCNC: 1.33 MMOL/L (ref 1.1–1.33)
CA-I BLDA-SCNC: 1.34 MMOL/L (ref 1.1–1.33)
CA-I BLDA-SCNC: 1.35 MMOL/L (ref 1.1–1.33)
CALCIUM SERPL-MCNC: 6.2 MG/DL (ref 8.6–10.3)
CALCIUM SERPL-MCNC: 8.7 MG/DL (ref 8.6–10.3)
CALCIUM SERPL-MCNC: 8.8 MG/DL (ref 8.6–10.3)
CALCIUM SERPL-MCNC: 8.9 MG/DL (ref 8.6–10.3)
CALCIUM SERPL-MCNC: 9.1 MG/DL (ref 8.6–10.3)
CALCIUM SERPL-MCNC: 9.1 MG/DL (ref 8.6–10.3)
CANNABINOIDS UR QL SCN: NORMAL
CARDIAC TROPONIN I PNL SERPL HS: 18 NG/L (ref 0–20)
CARDIAC TROPONIN I PNL SERPL HS: 689 NG/L (ref 0–20)
CHLORIDE BLDA-SCNC: 101 MMOL/L (ref 98–107)
CHLORIDE BLDA-SCNC: 108 MMOL/L (ref 98–107)
CHLORIDE BLDA-SCNC: 115 MMOL/L (ref 98–107)
CHLORIDE BLDA-SCNC: 117 MMOL/L (ref 98–107)
CHLORIDE BLDA-SCNC: 118 MMOL/L (ref 98–107)
CHLORIDE SERPL-SCNC: 110 MMOL/L (ref 98–107)
CHLORIDE SERPL-SCNC: 115 MMOL/L (ref 98–107)
CHLORIDE SERPL-SCNC: 116 MMOL/L (ref 98–107)
CHLORIDE SERPL-SCNC: 116 MMOL/L (ref 98–107)
CHLORIDE SERPL-SCNC: 117 MMOL/L (ref 98–107)
CHLORIDE SERPL-SCNC: 118 MMOL/L (ref 98–107)
CHLORIDE SERPL-SCNC: 98 MMOL/L (ref 98–107)
CO2 SERPL-SCNC: 23 MMOL/L (ref 21–32)
CO2 SERPL-SCNC: 24 MMOL/L (ref 21–32)
CO2 SERPL-SCNC: 29 MMOL/L (ref 21–32)
CO2 SERPL-SCNC: 30 MMOL/L (ref 21–32)
COLOR UR: ABNORMAL
COLOR UR: YELLOW
CREAT SERPL-MCNC: 0.75 MG/DL (ref 0.5–1.3)
CREAT SERPL-MCNC: 1.04 MG/DL (ref 0.5–1.3)
CREAT SERPL-MCNC: 1.16 MG/DL (ref 0.5–1.3)
CREAT SERPL-MCNC: 1.31 MG/DL (ref 0.5–1.3)
CREAT SERPL-MCNC: 1.52 MG/DL (ref 0.5–1.3)
CREAT SERPL-MCNC: 1.79 MG/DL (ref 0.5–1.3)
CREAT SERPL-MCNC: 1.79 MG/DL (ref 0.5–1.3)
CREAT SERPL-MCNC: 2.09 MG/DL (ref 0.5–1.3)
CREAT SERPL-MCNC: 2.83 MG/DL (ref 0.5–1.3)
CREAT SERPL-MCNC: 4.4 MG/DL (ref 0.5–1.3)
CREAT SERPL-MCNC: 5.19 MG/DL (ref 0.5–1.3)
DISPENSE STATUS: NORMAL
EGFRCR SERPLBLD CKD-EPI 2021: 12 ML/MIN/1.73M*2
EGFRCR SERPLBLD CKD-EPI 2021: 15 ML/MIN/1.73M*2
EGFRCR SERPLBLD CKD-EPI 2021: 25 ML/MIN/1.73M*2
EGFRCR SERPLBLD CKD-EPI 2021: 36 ML/MIN/1.73M*2
EGFRCR SERPLBLD CKD-EPI 2021: 44 ML/MIN/1.73M*2
EGFRCR SERPLBLD CKD-EPI 2021: 44 ML/MIN/1.73M*2
EGFRCR SERPLBLD CKD-EPI 2021: 53 ML/MIN/1.73M*2
EGFRCR SERPLBLD CKD-EPI 2021: 64 ML/MIN/1.73M*2
EGFRCR SERPLBLD CKD-EPI 2021: 74 ML/MIN/1.73M*2
EGFRCR SERPLBLD CKD-EPI 2021: 84 ML/MIN/1.73M*2
EGFRCR SERPLBLD CKD-EPI 2021: >90 ML/MIN/1.73M*2
EOSINOPHIL # BLD AUTO: 0.09 X10*3/UL (ref 0–0.7)
EOSINOPHIL NFR BLD AUTO: 0.5 %
ERYTHROCYTE [DISTWIDTH] IN BLOOD BY AUTOMATED COUNT: 13.2 % (ref 11.5–14.5)
ERYTHROCYTE [DISTWIDTH] IN BLOOD BY AUTOMATED COUNT: 13.4 % (ref 11.5–14.5)
ERYTHROCYTE [DISTWIDTH] IN BLOOD BY AUTOMATED COUNT: 13.6 % (ref 11.5–14.5)
ERYTHROCYTE [DISTWIDTH] IN BLOOD BY AUTOMATED COUNT: 14 % (ref 11.5–14.5)
ERYTHROCYTE [DISTWIDTH] IN BLOOD BY AUTOMATED COUNT: 14.3 % (ref 11.5–14.5)
ERYTHROCYTE [DISTWIDTH] IN BLOOD BY AUTOMATED COUNT: 14.4 % (ref 11.5–14.5)
ERYTHROCYTE [DISTWIDTH] IN BLOOD BY AUTOMATED COUNT: 14.5 % (ref 11.5–14.5)
ERYTHROCYTE [DISTWIDTH] IN BLOOD BY AUTOMATED COUNT: 14.5 % (ref 11.5–14.5)
EST. AVERAGE GLUCOSE BLD GHB EST-MCNC: 97 MG/DL
ETHANOL SERPL-MCNC: <10 MG/DL
FENTANYL+NORFENTANYL UR QL SCN: NORMAL
FLUAV RNA RESP QL NAA+PROBE: NOT DETECTED
FLUBV RNA RESP QL NAA+PROBE: NOT DETECTED
GLUCOSE BLD MANUAL STRIP-MCNC: 125 MG/DL (ref 74–99)
GLUCOSE BLD MANUAL STRIP-MCNC: 128 MG/DL (ref 74–99)
GLUCOSE BLD MANUAL STRIP-MCNC: 128 MG/DL (ref 74–99)
GLUCOSE BLD MANUAL STRIP-MCNC: 134 MG/DL (ref 74–99)
GLUCOSE BLD MANUAL STRIP-MCNC: 135 MG/DL (ref 74–99)
GLUCOSE BLD MANUAL STRIP-MCNC: 137 MG/DL (ref 74–99)
GLUCOSE BLD MANUAL STRIP-MCNC: 139 MG/DL (ref 74–99)
GLUCOSE BLD MANUAL STRIP-MCNC: 141 MG/DL (ref 74–99)
GLUCOSE BLD MANUAL STRIP-MCNC: 142 MG/DL (ref 74–99)
GLUCOSE BLD MANUAL STRIP-MCNC: 146 MG/DL (ref 74–99)
GLUCOSE BLD MANUAL STRIP-MCNC: 147 MG/DL (ref 74–99)
GLUCOSE BLD MANUAL STRIP-MCNC: 149 MG/DL (ref 74–99)
GLUCOSE BLD MANUAL STRIP-MCNC: 152 MG/DL (ref 74–99)
GLUCOSE BLD MANUAL STRIP-MCNC: 152 MG/DL (ref 74–99)
GLUCOSE BLD MANUAL STRIP-MCNC: 157 MG/DL (ref 74–99)
GLUCOSE BLD MANUAL STRIP-MCNC: 164 MG/DL (ref 74–99)
GLUCOSE BLD MANUAL STRIP-MCNC: 164 MG/DL (ref 74–99)
GLUCOSE BLD MANUAL STRIP-MCNC: 166 MG/DL (ref 74–99)
GLUCOSE BLD MANUAL STRIP-MCNC: 173 MG/DL (ref 74–99)
GLUCOSE BLD MANUAL STRIP-MCNC: 178 MG/DL (ref 74–99)
GLUCOSE BLD MANUAL STRIP-MCNC: 95 MG/DL (ref 74–99)
GLUCOSE BLDA-MCNC: 125 MG/DL (ref 74–99)
GLUCOSE BLDA-MCNC: 133 MG/DL (ref 74–99)
GLUCOSE BLDA-MCNC: 145 MG/DL (ref 74–99)
GLUCOSE BLDA-MCNC: 158 MG/DL (ref 74–99)
GLUCOSE BLDA-MCNC: 158 MG/DL (ref 74–99)
GLUCOSE BLDA-MCNC: 159 MG/DL (ref 74–99)
GLUCOSE BLDA-MCNC: 164 MG/DL (ref 74–99)
GLUCOSE BLDA-MCNC: 164 MG/DL (ref 74–99)
GLUCOSE BLDA-MCNC: 166 MG/DL (ref 74–99)
GLUCOSE BLDA-MCNC: 188 MG/DL (ref 74–99)
GLUCOSE BLDA-MCNC: 214 MG/DL (ref 74–99)
GLUCOSE SERPL-MCNC: 124 MG/DL (ref 74–99)
GLUCOSE SERPL-MCNC: 129 MG/DL (ref 74–99)
GLUCOSE SERPL-MCNC: 133 MG/DL (ref 74–99)
GLUCOSE SERPL-MCNC: 134 MG/DL (ref 74–99)
GLUCOSE SERPL-MCNC: 138 MG/DL (ref 74–99)
GLUCOSE SERPL-MCNC: 140 MG/DL (ref 74–99)
GLUCOSE SERPL-MCNC: 141 MG/DL (ref 74–99)
GLUCOSE SERPL-MCNC: 149 MG/DL (ref 74–99)
GLUCOSE SERPL-MCNC: 150 MG/DL (ref 74–99)
GLUCOSE SERPL-MCNC: 177 MG/DL (ref 74–99)
GLUCOSE SERPL-MCNC: 264 MG/DL (ref 74–99)
GLUCOSE UR STRIP.AUTO-MCNC: NORMAL MG/DL
GRAN CASTS #/AREA UR COMP ASSIST: ABNORMAL /LPF
HBA1C MFR BLD: 5 % (ref ?–5.7)
HCO3 BLDA-SCNC: 25.8 MMOL/L (ref 22–26)
HCO3 BLDA-SCNC: 25.9 MMOL/L (ref 22–26)
HCO3 BLDA-SCNC: 26 MMOL/L (ref 22–26)
HCO3 BLDA-SCNC: 26 MMOL/L (ref 22–26)
HCO3 BLDA-SCNC: 27.6 MMOL/L (ref 22–26)
HCO3 BLDA-SCNC: 28.5 MMOL/L (ref 22–26)
HCO3 BLDA-SCNC: 29.8 MMOL/L (ref 22–26)
HCO3 BLDA-SCNC: 30.1 MMOL/L (ref 22–26)
HCO3 BLDA-SCNC: 30.6 MMOL/L (ref 22–26)
HCO3 BLDA-SCNC: 30.8 MMOL/L (ref 22–26)
HCO3 BLDA-SCNC: 30.9 MMOL/L (ref 22–26)
HCT VFR BLD AUTO: 41.5 % (ref 41–52)
HCT VFR BLD AUTO: 41.7 % (ref 41–52)
HCT VFR BLD AUTO: 42.3 % (ref 41–52)
HCT VFR BLD AUTO: 43.5 % (ref 41–52)
HCT VFR BLD AUTO: 43.5 % (ref 41–52)
HCT VFR BLD AUTO: 43.7 % (ref 41–52)
HCT VFR BLD AUTO: 44.8 % (ref 41–52)
HCT VFR BLD AUTO: 46.8 % (ref 41–52)
HCT VFR BLD AUTO: 49.7 % (ref 41–52)
HCT VFR BLD EST: 41 % (ref 41–52)
HCT VFR BLD EST: 42 % (ref 41–52)
HCT VFR BLD EST: 45 % (ref 41–52)
HCT VFR BLD EST: 47 % (ref 41–52)
HCT VFR BLD EST: 51 % (ref 41–52)
HGB BLD-MCNC: 13.1 G/DL (ref 13.5–17.5)
HGB BLD-MCNC: 13.2 G/DL (ref 13.5–17.5)
HGB BLD-MCNC: 13.3 G/DL (ref 13.5–17.5)
HGB BLD-MCNC: 13.7 G/DL (ref 13.5–17.5)
HGB BLD-MCNC: 13.8 G/DL (ref 13.5–17.5)
HGB BLD-MCNC: 14.2 G/DL (ref 13.5–17.5)
HGB BLD-MCNC: 14.9 G/DL (ref 13.5–17.5)
HGB BLD-MCNC: 15.6 G/DL (ref 13.5–17.5)
HGB BLD-MCNC: 16.6 G/DL (ref 13.5–17.5)
HGB BLDA-MCNC: 13.5 G/DL (ref 13.5–17.5)
HGB BLDA-MCNC: 13.5 G/DL (ref 13.5–17.5)
HGB BLDA-MCNC: 13.6 G/DL (ref 13.5–17.5)
HGB BLDA-MCNC: 13.7 G/DL (ref 13.5–17.5)
HGB BLDA-MCNC: 13.7 G/DL (ref 13.5–17.5)
HGB BLDA-MCNC: 13.9 G/DL (ref 13.5–17.5)
HGB BLDA-MCNC: 14 G/DL (ref 13.5–17.5)
HGB BLDA-MCNC: 14.1 G/DL (ref 13.5–17.5)
HGB BLDA-MCNC: 15 G/DL (ref 13.5–17.5)
HGB BLDA-MCNC: 15.8 G/DL (ref 13.5–17.5)
HGB BLDA-MCNC: 16.9 G/DL (ref 13.5–17.5)
HOLD SPECIMEN: NORMAL
HYALINE CASTS #/AREA URNS AUTO: ABNORMAL /LPF
IMM GRANULOCYTES # BLD AUTO: 0.1 X10*3/UL (ref 0–0.7)
IMM GRANULOCYTES NFR BLD AUTO: 0.5 % (ref 0–0.9)
INHALED O2 CONCENTRATION: 100 %
INHALED O2 CONCENTRATION: 60 %
INHALED O2 CONCENTRATION: 70 %
INR PPP: 1.1 (ref 0.9–1.2)
INR PPP: 1.2 (ref 0.9–1.2)
KETONES UR STRIP.AUTO-MCNC: NEGATIVE MG/DL
LACTATE BLDA-SCNC: 1.3 MMOL/L (ref 0.4–2)
LACTATE BLDA-SCNC: 1.4 MMOL/L (ref 0.4–2)
LACTATE BLDA-SCNC: 1.6 MMOL/L (ref 0.4–2)
LACTATE BLDA-SCNC: 1.8 MMOL/L (ref 0.4–2)
LACTATE BLDA-SCNC: 1.8 MMOL/L (ref 0.4–2)
LACTATE BLDA-SCNC: 1.9 MMOL/L (ref 0.4–2)
LACTATE BLDA-SCNC: 2 MMOL/L (ref 0.4–2)
LACTATE BLDA-SCNC: 2 MMOL/L (ref 0.4–2)
LACTATE BLDA-SCNC: 2.3 MMOL/L (ref 0.4–2)
LACTATE SERPL-SCNC: 0.9 MMOL/L (ref 0.4–2)
LACTATE SERPL-SCNC: 1.1 MMOL/L (ref 0.4–2)
LACTATE SERPL-SCNC: 1.2 MMOL/L (ref 0.4–2)
LACTATE SERPL-SCNC: 1.2 MMOL/L (ref 0.4–2)
LACTATE SERPL-SCNC: 1.8 MMOL/L (ref 0.4–2)
LACTATE SERPL-SCNC: 1.9 MMOL/L (ref 0.4–2)
LACTATE SERPL-SCNC: 2 MMOL/L (ref 0.4–2)
LEUKOCYTE ESTERASE UR QL STRIP.AUTO: NEGATIVE
LIPASE SERPL-CCNC: 10 U/L (ref 9–82)
LIPASE SERPL-CCNC: 13 U/L (ref 9–82)
LIPASE SERPL-CCNC: 16 U/L (ref 9–82)
LIPASE SERPL-CCNC: 21 U/L (ref 9–82)
LIPASE SERPL-CCNC: 26 U/L (ref 9–82)
LIPASE SERPL-CCNC: 47 U/L (ref 9–82)
LYMPHOCYTES # BLD AUTO: 2.28 X10*3/UL (ref 1.2–4.8)
LYMPHOCYTES NFR BLD AUTO: 12.4 %
MAGNESIUM SERPL-MCNC: 1.32 MG/DL (ref 1.6–2.4)
MAGNESIUM SERPL-MCNC: 1.34 MG/DL (ref 1.6–2.4)
MAGNESIUM SERPL-MCNC: 1.39 MG/DL (ref 1.6–2.4)
MAGNESIUM SERPL-MCNC: 1.47 MG/DL (ref 1.6–2.4)
MAGNESIUM SERPL-MCNC: 1.74 MG/DL (ref 1.6–2.4)
MAGNESIUM SERPL-MCNC: 1.84 MG/DL (ref 1.6–2.4)
MAGNESIUM SERPL-MCNC: 1.86 MG/DL (ref 1.6–2.4)
MAGNESIUM SERPL-MCNC: 2 MG/DL (ref 1.6–2.4)
MAGNESIUM SERPL-MCNC: 2.04 MG/DL (ref 1.6–2.4)
MCH RBC QN AUTO: 32 PG (ref 26–34)
MCH RBC QN AUTO: 32.4 PG (ref 26–34)
MCH RBC QN AUTO: 32.7 PG (ref 26–34)
MCH RBC QN AUTO: 32.7 PG (ref 26–34)
MCH RBC QN AUTO: 32.8 PG (ref 26–34)
MCH RBC QN AUTO: 32.8 PG (ref 26–34)
MCH RBC QN AUTO: 32.9 PG (ref 26–34)
MCH RBC QN AUTO: 33.1 PG (ref 26–34)
MCHC RBC AUTO-ENTMCNC: 31.2 G/DL (ref 32–36)
MCHC RBC AUTO-ENTMCNC: 31.5 G/DL (ref 32–36)
MCHC RBC AUTO-ENTMCNC: 31.6 G/DL (ref 32–36)
MCHC RBC AUTO-ENTMCNC: 31.7 G/DL (ref 32–36)
MCHC RBC AUTO-ENTMCNC: 31.9 G/DL (ref 32–36)
MCHC RBC AUTO-ENTMCNC: 32.5 G/DL (ref 32–36)
MCHC RBC AUTO-ENTMCNC: 33.3 G/DL (ref 32–36)
MCHC RBC AUTO-ENTMCNC: 33.4 G/DL (ref 32–36)
MCV RBC AUTO: 101 FL (ref 80–100)
MCV RBC AUTO: 103 FL (ref 80–100)
MCV RBC AUTO: 103 FL (ref 80–100)
MCV RBC AUTO: 104 FL (ref 80–100)
MCV RBC AUTO: 105 FL (ref 80–100)
MCV RBC AUTO: 106 FL (ref 80–100)
MCV RBC AUTO: 96 FL (ref 80–100)
MCV RBC AUTO: 97 FL (ref 80–100)
METHADONE UR QL SCN: NORMAL
MONOCYTES # BLD AUTO: 1.26 X10*3/UL (ref 0.1–1)
MONOCYTES NFR BLD AUTO: 6.9 %
MUCOUS THREADS #/AREA URNS AUTO: ABNORMAL /LPF
MUCOUS THREADS #/AREA URNS AUTO: ABNORMAL /LPF
MUCOUS THREADS #/AREA URNS AUTO: NORMAL /LPF
NEUTROPHILS # BLD AUTO: 14.5 X10*3/UL (ref 1.2–7.7)
NEUTROPHILS NFR BLD AUTO: 79.1 %
NITRITE UR QL STRIP.AUTO: NEGATIVE
NRBC BLD-RTO: 0 /100 WBCS (ref 0–0)
OPIATES UR QL SCN: NORMAL
OXYCODONE+OXYMORPHONE UR QL SCN: NORMAL
OXYHGB MFR BLDA: 91.4 % (ref 94–98)
OXYHGB MFR BLDA: 93.4 % (ref 94–98)
OXYHGB MFR BLDA: 94.3 % (ref 94–98)
OXYHGB MFR BLDA: 95.3 % (ref 94–98)
OXYHGB MFR BLDA: 95.5 % (ref 94–98)
OXYHGB MFR BLDA: 95.5 % (ref 94–98)
OXYHGB MFR BLDA: 95.6 % (ref 94–98)
OXYHGB MFR BLDA: 96.5 % (ref 94–98)
OXYHGB MFR BLDA: 96.6 % (ref 94–98)
OXYHGB MFR BLDA: 96.9 % (ref 94–98)
OXYHGB MFR BLDA: 97.8 % (ref 94–98)
P AXIS: 52 DEGREES
P OFFSET: 193 MS
P ONSET: 129 MS
PCO2 BLDA: 46 MM HG (ref 38–42)
PCO2 BLDA: 46 MM HG (ref 38–42)
PCO2 BLDA: 47 MM HG (ref 38–42)
PCO2 BLDA: 49 MM HG (ref 38–42)
PCO2 BLDA: 50 MM HG (ref 38–42)
PCO2 BLDA: 54 MM HG (ref 38–42)
PCO2 BLDA: 57 MM HG (ref 38–42)
PCO2 BLDA: 58 MM HG (ref 38–42)
PCO2 BLDA: 58 MM HG (ref 38–42)
PCO2 BLDA: 60 MM HG (ref 38–42)
PCO2 BLDA: 64 MM HG (ref 38–42)
PCP UR QL SCN: NORMAL
PEEP CMH2O: 10 CM H2O
PEEP CMH2O: 8 CM H2O
PH BLDA: 7.26 PH (ref 7.38–7.42)
PH BLDA: 7.29 PH (ref 7.38–7.42)
PH BLDA: 7.32 PH (ref 7.38–7.42)
PH BLDA: 7.33 PH (ref 7.38–7.42)
PH BLDA: 7.35 PH (ref 7.38–7.42)
PH BLDA: 7.36 PH (ref 7.38–7.42)
PH BLDA: 7.4 PH (ref 7.38–7.42)
PH UR STRIP.AUTO: 5.5 [PH]
PH UR STRIP.AUTO: 5.5 [PH]
PH UR STRIP.AUTO: 6 [PH]
PH UR STRIP.AUTO: 6 [PH]
PHOSPHATE SERPL-MCNC: 1.8 MG/DL (ref 2.5–4.9)
PHOSPHATE SERPL-MCNC: 2 MG/DL (ref 2.5–4.9)
PHOSPHATE SERPL-MCNC: 2.7 MG/DL (ref 2.5–4.9)
PHOSPHATE SERPL-MCNC: 2.7 MG/DL (ref 2.5–4.9)
PHOSPHATE SERPL-MCNC: 2.8 MG/DL (ref 2.5–4.9)
PHOSPHATE SERPL-MCNC: 2.9 MG/DL (ref 2.5–4.9)
PHOSPHATE SERPL-MCNC: 3.5 MG/DL (ref 2.5–4.9)
PLATELET # BLD AUTO: 100 X10*3/UL (ref 150–450)
PLATELET # BLD AUTO: 101 X10*3/UL (ref 150–450)
PLATELET # BLD AUTO: 102 X10*3/UL (ref 150–450)
PLATELET # BLD AUTO: 105 X10*3/UL (ref 150–450)
PLATELET # BLD AUTO: 105 X10*3/UL (ref 150–450)
PLATELET # BLD AUTO: 145 X10*3/UL (ref 150–450)
PLATELET # BLD AUTO: 196 X10*3/UL (ref 150–450)
PLATELET # BLD AUTO: 209 X10*3/UL (ref 150–450)
PO2 BLDA: 109 MM HG (ref 85–95)
PO2 BLDA: 182 MM HG (ref 85–95)
PO2 BLDA: 240 MM HG (ref 85–95)
PO2 BLDA: 77 MM HG (ref 85–95)
PO2 BLDA: 78 MM HG (ref 85–95)
PO2 BLDA: 79 MM HG (ref 85–95)
PO2 BLDA: 79 MM HG (ref 85–95)
PO2 BLDA: 80 MM HG (ref 85–95)
PO2 BLDA: 89 MM HG (ref 85–95)
POTASSIUM BLDA-SCNC: 3.8 MMOL/L (ref 3.5–5.3)
POTASSIUM BLDA-SCNC: 4 MMOL/L (ref 3.5–5.3)
POTASSIUM BLDA-SCNC: 4.1 MMOL/L (ref 3.5–5.3)
POTASSIUM BLDA-SCNC: 4.2 MMOL/L (ref 3.5–5.3)
POTASSIUM BLDA-SCNC: 4.4 MMOL/L (ref 3.5–5.3)
POTASSIUM BLDA-SCNC: 4.7 MMOL/L (ref 3.5–5.3)
POTASSIUM BLDA-SCNC: 5.1 MMOL/L (ref 3.5–5.3)
POTASSIUM SERPL-SCNC: 3.1 MMOL/L (ref 3.5–5.3)
POTASSIUM SERPL-SCNC: 3.4 MMOL/L (ref 3.5–5.3)
POTASSIUM SERPL-SCNC: 3.9 MMOL/L (ref 3.5–5.3)
POTASSIUM SERPL-SCNC: 4 MMOL/L (ref 3.5–5.3)
POTASSIUM SERPL-SCNC: 4.1 MMOL/L (ref 3.5–5.3)
POTASSIUM SERPL-SCNC: 4.4 MMOL/L (ref 3.5–5.3)
POTASSIUM SERPL-SCNC: 4.5 MMOL/L (ref 3.5–5.3)
POTASSIUM SERPL-SCNC: 5.4 MMOL/L (ref 3.5–5.3)
PR INTERVAL: 152 MS
PRODUCT BLOOD TYPE: 9500
PRODUCT BLOOD TYPE: 9500
PRODUCT BLOOD TYPE: NORMAL
PRODUCT CODE: NORMAL
PROT SERPL-MCNC: 5.6 G/DL (ref 6.4–8.2)
PROT SERPL-MCNC: 5.7 G/DL (ref 6.4–8.2)
PROT SERPL-MCNC: 5.8 G/DL (ref 6.4–8.2)
PROT SERPL-MCNC: 5.9 G/DL (ref 6.4–8.2)
PROT SERPL-MCNC: 6 G/DL (ref 6.4–8.2)
PROT SERPL-MCNC: 6 G/DL (ref 6.4–8.2)
PROT SERPL-MCNC: 6.5 G/DL (ref 6.4–8.2)
PROT SERPL-MCNC: 7 G/DL (ref 6.4–8.2)
PROT UR STRIP.AUTO-MCNC: ABNORMAL MG/DL
PROTHROMBIN TIME: 11.9 SECONDS (ref 9.3–12.7)
PROTHROMBIN TIME: 12.7 SECONDS (ref 9.3–12.7)
PROTHROMBIN TIME: 12.9 SECONDS (ref 9.3–12.7)
PROTHROMBIN TIME: 13 SECONDS (ref 9.3–12.7)
PROTHROMBIN TIME: 13 SECONDS (ref 9.3–12.7)
Q ONSET: 205 MS
QRS COUNT: 17 BEATS
QRS DURATION: 110 MS
QT INTERVAL: 378 MS
QTC CALCULATION(BAZETT): 504 MS
QTC FREDERICIA: 458 MS
R AXIS: -88 DEGREES
RBC # BLD AUTO: 3.99 X10*6/UL (ref 4.5–5.9)
RBC # BLD AUTO: 3.99 X10*6/UL (ref 4.5–5.9)
RBC # BLD AUTO: 4.07 X10*6/UL (ref 4.5–5.9)
RBC # BLD AUTO: 4.16 X10*6/UL (ref 4.5–5.9)
RBC # BLD AUTO: 4.21 X10*6/UL (ref 4.5–5.9)
RBC # BLD AUTO: 4.34 X10*6/UL (ref 4.5–5.9)
RBC # BLD AUTO: 4.87 X10*6/UL (ref 4.5–5.9)
RBC # BLD AUTO: 5.12 X10*6/UL (ref 4.5–5.9)
RBC # UR STRIP.AUTO: ABNORMAL MG/DL
RBC # UR STRIP.AUTO: NEGATIVE MG/DL
RBC #/AREA URNS AUTO: >20 /HPF
RBC #/AREA URNS AUTO: ABNORMAL /HPF
RBC #/AREA URNS AUTO: ABNORMAL /HPF
RBC #/AREA URNS AUTO: NORMAL /HPF
RH FACTOR (ANTIGEN D): NORMAL
RSV RNA RESP QL NAA+PROBE: NOT DETECTED
SALICYLATES SERPL-MCNC: <3 MG/DL (ref ?–20)
SAO2 % BLDA: 100 % (ref 94–100)
SAO2 % BLDA: 95 % (ref 94–100)
SAO2 % BLDA: 97 % (ref 94–100)
SAO2 % BLDA: 97 % (ref 94–100)
SAO2 % BLDA: 98 % (ref 94–100)
SAO2 % BLDA: 99 % (ref 94–100)
SARS-COV-2 RNA RESP QL NAA+PROBE: NOT DETECTED
SODIUM BLDA-SCNC: 133 MMOL/L (ref 136–145)
SODIUM BLDA-SCNC: 141 MMOL/L (ref 136–145)
SODIUM BLDA-SCNC: 143 MMOL/L (ref 136–145)
SODIUM BLDA-SCNC: 144 MMOL/L (ref 136–145)
SODIUM BLDA-SCNC: 146 MMOL/L (ref 136–145)
SODIUM BLDA-SCNC: 146 MMOL/L (ref 136–145)
SODIUM BLDA-SCNC: 147 MMOL/L (ref 136–145)
SODIUM BLDA-SCNC: 148 MMOL/L (ref 136–145)
SODIUM SERPL-SCNC: 134 MMOL/L (ref 136–145)
SODIUM SERPL-SCNC: 144 MMOL/L (ref 136–145)
SODIUM SERPL-SCNC: 145 MMOL/L (ref 136–145)
SODIUM SERPL-SCNC: 147 MMOL/L (ref 136–145)
SODIUM SERPL-SCNC: 148 MMOL/L (ref 136–145)
SODIUM SERPL-SCNC: 149 MMOL/L (ref 136–145)
SODIUM SERPL-SCNC: 149 MMOL/L (ref 136–145)
SODIUM SERPL-SCNC: 150 MMOL/L (ref 136–145)
SODIUM SERPL-SCNC: 151 MMOL/L (ref 136–145)
SP GR UR STRIP.AUTO: 1
SP GR UR STRIP.AUTO: 1.01
SP GR UR STRIP.AUTO: 1.02
SP GR UR STRIP.AUTO: >1.05
SPECIMEN DRAWN FROM PATIENT: ABNORMAL
SPONTANEOUS TIDAL VOLUME: 558 ML
T AXIS: 61 DEGREES
T OFFSET: 394 MS
TIDAL VOLUME: 550 ML
TIDAL VOLUME: 630 ML
TIDAL VOLUME: 630 ML
TIDAL VOLUME: 650 ML
TIDAL VOLUME: 650 ML
TOTAL MINUTE VOLUME: 8.9 LITER
UNIT ABO: NORMAL
UNIT NUMBER: NORMAL
UNIT RH: NORMAL
UNIT VOLUME: 281
UNIT VOLUME: 350
UROBILINOGEN UR STRIP.AUTO-MCNC: ABNORMAL MG/DL
UROBILINOGEN UR STRIP.AUTO-MCNC: NORMAL MG/DL
VENTILATOR MODE: ABNORMAL
VENTILATOR RATE: 16 BPM
VENTILATOR RATE: 16 BPM
VENTILATOR RATE: 20 BPM
VENTILATOR RATE: 24 BPM
VENTILATOR RATE: 26 BPM
VENTILATOR RATE: 26 BPM
VENTILATOR RATE: 28 BPM
VENTILATOR RATE: 28 BPM
VENTILATOR RATE: 30 BPM
VENTRICULAR RATE: 107 BPM
WBC # BLD AUTO: 14.9 X10*3/UL (ref 4.4–11.3)
WBC # BLD AUTO: 16.2 X10*3/UL (ref 4.4–11.3)
WBC # BLD AUTO: 16.2 X10*3/UL (ref 4.4–11.3)
WBC # BLD AUTO: 18.3 X10*3/UL (ref 4.4–11.3)
WBC # BLD AUTO: 18.5 X10*3/UL (ref 4.4–11.3)
WBC # BLD AUTO: 18.6 X10*3/UL (ref 4.4–11.3)
WBC # BLD AUTO: 19.3 X10*3/UL (ref 4.4–11.3)
WBC # BLD AUTO: 19.7 X10*3/UL (ref 4.4–11.3)
WBC #/AREA URNS AUTO: ABNORMAL /HPF
WBC #/AREA URNS AUTO: NORMAL /HPF
WBC CLUMPS #/AREA URNS AUTO: ABNORMAL /HPF
XM INTEP: NORMAL
YEAST BUDDING #/AREA UR COMP ASSIST: ABNORMAL /HPF

## 2025-01-01 PROCEDURE — 2500000001 HC RX 250 WO HCPCS SELF ADMINISTERED DRUGS (ALT 637 FOR MEDICARE OP): Performed by: INTERNAL MEDICINE

## 2025-01-01 PROCEDURE — 82435 ASSAY OF BLOOD CHLORIDE: CPT

## 2025-01-01 PROCEDURE — 99291 CRITICAL CARE FIRST HOUR: CPT | Performed by: INTERNAL MEDICINE

## 2025-01-01 PROCEDURE — 2500000004 HC RX 250 GENERAL PHARMACY W/ HCPCS (ALT 636 FOR OP/ED): Mod: JZ | Performed by: INTERNAL MEDICINE

## 2025-01-01 PROCEDURE — 82330 ASSAY OF CALCIUM: CPT

## 2025-01-01 PROCEDURE — 74018 RADEX ABDOMEN 1 VIEW: CPT | Performed by: RADIOLOGY

## 2025-01-01 PROCEDURE — 87637 SARSCOV2&INF A&B&RSV AMP PRB: CPT | Performed by: CLINICAL NURSE SPECIALIST

## 2025-01-01 PROCEDURE — 2720000007 HC OR 272 NO HCPCS

## 2025-01-01 PROCEDURE — 36415 COLL VENOUS BLD VENIPUNCTURE: CPT

## 2025-01-01 PROCEDURE — 83690 ASSAY OF LIPASE: CPT

## 2025-01-01 PROCEDURE — 82435 ASSAY OF BLOOD CHLORIDE: CPT | Performed by: NURSE PRACTITIONER

## 2025-01-01 PROCEDURE — 85027 COMPLETE CBC AUTOMATED: CPT

## 2025-01-01 PROCEDURE — 82150 ASSAY OF AMYLASE: CPT

## 2025-01-01 PROCEDURE — 2020000001 HC ICU ROOM DAILY

## 2025-01-01 PROCEDURE — 99291 CRITICAL CARE FIRST HOUR: CPT | Performed by: EMERGENCY MEDICINE

## 2025-01-01 PROCEDURE — 3E033XZ INTRODUCTION OF VASOPRESSOR INTO PERIPHERAL VEIN, PERCUTANEOUS APPROACH: ICD-10-PCS | Performed by: INTERNAL MEDICINE

## 2025-01-01 PROCEDURE — 83605 ASSAY OF LACTIC ACID: CPT

## 2025-01-01 PROCEDURE — 84100 ASSAY OF PHOSPHORUS: CPT

## 2025-01-01 PROCEDURE — 36620 INSERTION CATHETER ARTERY: CPT

## 2025-01-01 PROCEDURE — 85610 PROTHROMBIN TIME: CPT

## 2025-01-01 PROCEDURE — 36600 WITHDRAWAL OF ARTERIAL BLOOD: CPT

## 2025-01-01 PROCEDURE — 84484 ASSAY OF TROPONIN QUANT: CPT | Performed by: CLINICAL NURSE SPECIALIST

## 2025-01-01 PROCEDURE — 86901 BLOOD TYPING SEROLOGIC RH(D): CPT

## 2025-01-01 PROCEDURE — 2500000005 HC RX 250 GENERAL PHARMACY W/O HCPCS: Performed by: INTERNAL MEDICINE

## 2025-01-01 PROCEDURE — 83735 ASSAY OF MAGNESIUM: CPT

## 2025-01-01 PROCEDURE — 70450 CT HEAD/BRAIN W/O DYE: CPT | Performed by: RADIOLOGY

## 2025-01-01 PROCEDURE — 94003 VENT MGMT INPAT SUBQ DAY: CPT

## 2025-01-01 PROCEDURE — 37799 UNLISTED PX VASCULAR SURGERY: CPT

## 2025-01-01 PROCEDURE — 82435 ASSAY OF BLOOD CHLORIDE: CPT | Performed by: INTERNAL MEDICINE

## 2025-01-01 PROCEDURE — 36415 COLL VENOUS BLD VENIPUNCTURE: CPT | Performed by: CLINICAL NURSE SPECIALIST

## 2025-01-01 PROCEDURE — 82374 ASSAY BLOOD CARBON DIOXIDE: CPT

## 2025-01-01 PROCEDURE — 82248 BILIRUBIN DIRECT: CPT

## 2025-01-01 PROCEDURE — 2500000005 HC RX 250 GENERAL PHARMACY W/O HCPCS

## 2025-01-01 PROCEDURE — 86923 COMPATIBILITY TEST ELECTRIC: CPT

## 2025-01-01 PROCEDURE — 74018 RADEX ABDOMEN 1 VIEW: CPT

## 2025-01-01 PROCEDURE — 71045 X-RAY EXAM CHEST 1 VIEW: CPT

## 2025-01-01 PROCEDURE — 81001 URINALYSIS AUTO W/SCOPE: CPT

## 2025-01-01 PROCEDURE — 0BH17EZ INSERTION OF ENDOTRACHEAL AIRWAY INTO TRACHEA, VIA NATURAL OR ARTIFICIAL OPENING: ICD-10-PCS | Performed by: INTERNAL MEDICINE

## 2025-01-01 PROCEDURE — 85730 THROMBOPLASTIN TIME PARTIAL: CPT

## 2025-01-01 PROCEDURE — 2500000005 HC RX 250 GENERAL PHARMACY W/O HCPCS: Performed by: NURSE PRACTITIONER

## 2025-01-01 PROCEDURE — 31500 INSERT EMERGENCY AIRWAY: CPT | Performed by: EMERGENCY MEDICINE

## 2025-01-01 PROCEDURE — 2500000004 HC RX 250 GENERAL PHARMACY W/ HCPCS (ALT 636 FOR OP/ED): Mod: JZ | Performed by: EMERGENCY MEDICINE

## 2025-01-01 PROCEDURE — 2500000004 HC RX 250 GENERAL PHARMACY W/ HCPCS (ALT 636 FOR OP/ED): Mod: JZ

## 2025-01-01 PROCEDURE — 83036 HEMOGLOBIN GLYCOSYLATED A1C: CPT | Mod: WESLAB

## 2025-01-01 PROCEDURE — 72125 CT NECK SPINE W/O DYE: CPT

## 2025-01-01 PROCEDURE — 2500000004 HC RX 250 GENERAL PHARMACY W/ HCPCS (ALT 636 FOR OP/ED): Mod: JZ | Performed by: NURSE PRACTITIONER

## 2025-01-01 PROCEDURE — 93005 ELECTROCARDIOGRAM TRACING: CPT

## 2025-01-01 PROCEDURE — 94002 VENT MGMT INPAT INIT DAY: CPT

## 2025-01-01 PROCEDURE — 82947 ASSAY GLUCOSE BLOOD QUANT: CPT

## 2025-01-01 PROCEDURE — 2500000005 HC RX 250 GENERAL PHARMACY W/O HCPCS: Performed by: EMERGENCY MEDICINE

## 2025-01-01 PROCEDURE — 70450 CT HEAD/BRAIN W/O DYE: CPT

## 2025-01-01 PROCEDURE — 85025 COMPLETE CBC W/AUTO DIFF WBC: CPT | Performed by: CLINICAL NURSE SPECIALIST

## 2025-01-01 PROCEDURE — 2500000002 HC RX 250 W HCPCS SELF ADMINISTERED DRUGS (ALT 637 FOR MEDICARE OP, ALT 636 FOR OP/ED)

## 2025-01-01 PROCEDURE — 80053 COMPREHEN METABOLIC PANEL: CPT | Performed by: CLINICAL NURSE SPECIALIST

## 2025-01-01 PROCEDURE — 80307 DRUG TEST PRSMV CHEM ANLYZR: CPT

## 2025-01-01 PROCEDURE — 80053 COMPREHEN METABOLIC PANEL: CPT

## 2025-01-01 PROCEDURE — 80076 HEPATIC FUNCTION PANEL: CPT

## 2025-01-01 PROCEDURE — 80320 DRUG SCREEN QUANTALCOHOLS: CPT | Performed by: CLINICAL NURSE SPECIALIST

## 2025-01-01 PROCEDURE — 82435 ASSAY OF BLOOD CHLORIDE: CPT | Performed by: CLINICAL NURSE SPECIALIST

## 2025-01-01 PROCEDURE — 71260 CT THORAX DX C+: CPT | Performed by: RADIOLOGY

## 2025-01-01 PROCEDURE — 5A1945Z RESPIRATORY VENTILATION, 24-96 CONSECUTIVE HOURS: ICD-10-PCS | Performed by: INTERNAL MEDICINE

## 2025-01-01 PROCEDURE — 71045 X-RAY EXAM CHEST 1 VIEW: CPT | Performed by: RADIOLOGY

## 2025-01-01 PROCEDURE — 83735 ASSAY OF MAGNESIUM: CPT | Performed by: CLINICAL NURSE SPECIALIST

## 2025-01-01 PROCEDURE — 3600000009 HC OR TIME - EACH INCREMENTAL 1 MINUTE - PROCEDURE LEVEL FOUR

## 2025-01-01 PROCEDURE — 3600000004 HC OR TIME - INITIAL BASE CHARGE - PROCEDURE LEVEL FOUR

## 2025-01-01 PROCEDURE — 36620 INSERTION CATHETER ARTERY: CPT | Performed by: NURSE PRACTITIONER

## 2025-01-01 PROCEDURE — 99291 CRITICAL CARE FIRST HOUR: CPT

## 2025-01-01 PROCEDURE — 84075 ASSAY ALKALINE PHOSPHATASE: CPT

## 2025-01-01 PROCEDURE — 93010 ELECTROCARDIOGRAM REPORT: CPT | Performed by: INTERNAL MEDICINE

## 2025-01-01 PROCEDURE — 74177 CT ABD & PELVIS W/CONTRAST: CPT

## 2025-01-01 PROCEDURE — 71045 X-RAY EXAM CHEST 1 VIEW: CPT | Performed by: INTERNAL MEDICINE

## 2025-01-01 PROCEDURE — 2550000001 HC RX 255 CONTRASTS: Performed by: CLINICAL NURSE SPECIALIST

## 2025-01-01 PROCEDURE — 82805 BLOOD GASES W/O2 SATURATION: CPT

## 2025-01-01 PROCEDURE — 83690 ASSAY OF LIPASE: CPT | Performed by: CLINICAL NURSE SPECIALIST

## 2025-01-01 PROCEDURE — 72125 CT NECK SPINE W/O DYE: CPT | Performed by: RADIOLOGY

## 2025-01-01 PROCEDURE — 74177 CT ABD & PELVIS W/CONTRAST: CPT | Performed by: RADIOLOGY

## 2025-01-01 PROCEDURE — 85014 HEMATOCRIT: CPT

## 2025-01-01 PROCEDURE — 95822 EEG COMA OR SLEEP ONLY: CPT

## 2025-01-01 PROCEDURE — 9420000001 HC RT PATIENT EDUCATION 5 MIN

## 2025-01-01 RX ORDER — HYDRALAZINE HYDROCHLORIDE 20 MG/ML
5 INJECTION INTRAMUSCULAR; INTRAVENOUS EVERY 4 HOURS PRN
Status: DISCONTINUED | OUTPATIENT
Start: 2025-01-01 | End: 2025-01-01

## 2025-01-01 RX ORDER — PANTOPRAZOLE SODIUM 40 MG/10ML
40 INJECTION, POWDER, LYOPHILIZED, FOR SOLUTION INTRAVENOUS
Status: DISCONTINUED | OUTPATIENT
Start: 2025-01-01 | End: 2025-01-01 | Stop reason: HOSPADM

## 2025-01-01 RX ORDER — GLYCOPYRROLATE 0.2 MG/ML
0.2 INJECTION INTRAMUSCULAR; INTRAVENOUS EVERY 4 HOURS PRN
Status: DISCONTINUED | OUTPATIENT
Start: 2025-01-01 | End: 2025-01-01 | Stop reason: HOSPADM

## 2025-01-01 RX ORDER — SODIUM CHLORIDE 9 MG/ML
150 INJECTION, SOLUTION INTRAVENOUS CONTINUOUS
Status: DISCONTINUED | OUTPATIENT
Start: 2025-01-01 | End: 2025-01-01

## 2025-01-01 RX ORDER — INSULIN LISPRO 100 [IU]/ML
0-5 INJECTION, SOLUTION INTRAVENOUS; SUBCUTANEOUS EVERY 4 HOURS
Status: DISCONTINUED | OUTPATIENT
Start: 2025-01-01 | End: 2025-01-01 | Stop reason: HOSPADM

## 2025-01-01 RX ORDER — DEXTROSE MONOHYDRATE AND SODIUM CHLORIDE 5; .45 G/100ML; G/100ML
100 INJECTION, SOLUTION INTRAVENOUS CONTINUOUS
Status: ACTIVE | OUTPATIENT
Start: 2025-01-01 | End: 2025-01-01

## 2025-01-01 RX ORDER — POLYETHYLENE GLYCOL 3350 17 G/17G
17 POWDER, FOR SOLUTION ORAL DAILY PRN
Status: DISCONTINUED | OUTPATIENT
Start: 2025-01-01 | End: 2025-01-01 | Stop reason: HOSPADM

## 2025-01-01 RX ORDER — DEXTROSE 50 % IN WATER (D50W) INTRAVENOUS SYRINGE
25
Status: DISCONTINUED | OUTPATIENT
Start: 2025-01-01 | End: 2025-01-01 | Stop reason: HOSPADM

## 2025-01-01 RX ORDER — PROPOFOL 10 MG/ML
0-50 INJECTION, EMULSION INTRAVENOUS CONTINUOUS
Status: DISCONTINUED | OUTPATIENT
Start: 2025-01-01 | End: 2025-01-01

## 2025-01-01 RX ORDER — ROSUVASTATIN CALCIUM 20 MG/1
20 TABLET, COATED ORAL NIGHTLY
Status: DISCONTINUED | OUTPATIENT
Start: 2025-01-01 | End: 2025-01-01

## 2025-01-01 RX ORDER — ONDANSETRON HYDROCHLORIDE 2 MG/ML
4 INJECTION, SOLUTION INTRAVENOUS EVERY 4 HOURS PRN
Status: DISCONTINUED | OUTPATIENT
Start: 2025-01-01 | End: 2025-01-01 | Stop reason: HOSPADM

## 2025-01-01 RX ORDER — ROCURONIUM BROMIDE 10 MG/ML
INJECTION, SOLUTION INTRAVENOUS CODE/TRAUMA/SEDATION MEDICATION
Status: COMPLETED | OUTPATIENT
Start: 2025-01-01 | End: 2025-01-01

## 2025-01-01 RX ORDER — ESOMEPRAZOLE MAGNESIUM 40 MG/1
40 GRANULE, DELAYED RELEASE ORAL
Status: DISCONTINUED | OUTPATIENT
Start: 2025-01-01 | End: 2025-01-01

## 2025-01-01 RX ORDER — MAGNESIUM SULFATE 1 G/100ML
1 INJECTION INTRAVENOUS ONCE
Status: COMPLETED | OUTPATIENT
Start: 2025-01-01 | End: 2025-01-01

## 2025-01-01 RX ORDER — NOREPINEPHRINE BITARTRATE/D5W 8 MG/250ML
0-1 PLASTIC BAG, INJECTION (ML) INTRAVENOUS CONTINUOUS
Status: DISCONTINUED | OUTPATIENT
Start: 2025-01-01 | End: 2025-01-01

## 2025-01-01 RX ORDER — MORPHINE SULFATE 2 MG/ML
2 INJECTION, SOLUTION INTRAMUSCULAR; INTRAVENOUS
Status: DISCONTINUED | OUTPATIENT
Start: 2025-01-01 | End: 2025-01-01 | Stop reason: HOSPADM

## 2025-01-01 RX ORDER — CALCIUM GLUCONATE 20 MG/ML
1 INJECTION, SOLUTION INTRAVENOUS ONCE
Status: COMPLETED | OUTPATIENT
Start: 2025-01-01 | End: 2025-01-01

## 2025-01-01 RX ORDER — ACETAMINOPHEN 160 MG/5ML
650 SOLUTION ORAL EVERY 6 HOURS PRN
Status: DISCONTINUED | OUTPATIENT
Start: 2025-01-01 | End: 2025-01-01 | Stop reason: HOSPADM

## 2025-01-01 RX ORDER — FENTANYL CITRATE-0.9 % NACL/PF 10 MCG/ML
0-300 PLASTIC BAG, INJECTION (ML) INTRAVENOUS CONTINUOUS
Status: DISCONTINUED | OUTPATIENT
Start: 2025-01-01 | End: 2025-01-01

## 2025-01-01 RX ORDER — LABETALOL HYDROCHLORIDE 5 MG/ML
10 INJECTION, SOLUTION INTRAVENOUS EVERY 4 HOURS PRN
Status: DISCONTINUED | OUTPATIENT
Start: 2025-01-01 | End: 2025-01-01 | Stop reason: HOSPADM

## 2025-01-01 RX ORDER — PANTOPRAZOLE SODIUM 40 MG/1
40 TABLET, DELAYED RELEASE ORAL
Status: DISCONTINUED | OUTPATIENT
Start: 2025-01-01 | End: 2025-01-01

## 2025-01-01 RX ORDER — NOREPINEPHRINE BITARTRATE 0.06 MG/ML
0-1 INJECTION, SOLUTION INTRAVENOUS CONTINUOUS
Status: DISCONTINUED | OUTPATIENT
Start: 2025-01-01 | End: 2025-01-01 | Stop reason: HOSPADM

## 2025-01-01 RX ORDER — HYDRALAZINE HYDROCHLORIDE 20 MG/ML
5 INJECTION INTRAMUSCULAR; INTRAVENOUS ONCE
Status: COMPLETED | OUTPATIENT
Start: 2025-01-01 | End: 2025-01-01

## 2025-01-01 RX ORDER — ALBUTEROL SULFATE 0.83 MG/ML
2.5 SOLUTION RESPIRATORY (INHALATION) EVERY 4 HOURS PRN
Status: DISCONTINUED | OUTPATIENT
Start: 2025-01-01 | End: 2025-01-01 | Stop reason: HOSPADM

## 2025-01-01 RX ORDER — ETOMIDATE 2 MG/ML
INJECTION INTRAVENOUS CODE/TRAUMA/SEDATION MEDICATION
Status: COMPLETED | OUTPATIENT
Start: 2025-01-01 | End: 2025-01-01

## 2025-01-01 RX ORDER — HYDRALAZINE HYDROCHLORIDE 20 MG/ML
10 INJECTION INTRAMUSCULAR; INTRAVENOUS EVERY 4 HOURS PRN
Status: DISCONTINUED | OUTPATIENT
Start: 2025-01-01 | End: 2025-01-01 | Stop reason: HOSPADM

## 2025-01-01 RX ORDER — POTASSIUM CHLORIDE 1.5 G/1.58G
40 POWDER, FOR SOLUTION ORAL ONCE
Status: COMPLETED | OUTPATIENT
Start: 2025-01-01 | End: 2025-01-01

## 2025-01-01 RX ORDER — LORAZEPAM 2 MG/ML
0.5 INJECTION INTRAMUSCULAR EVERY 4 HOURS PRN
Status: DISCONTINUED | OUTPATIENT
Start: 2025-01-01 | End: 2025-01-01 | Stop reason: HOSPADM

## 2025-01-01 RX ORDER — LABETALOL HYDROCHLORIDE 5 MG/ML
10 INJECTION, SOLUTION INTRAVENOUS ONCE
Status: DISCONTINUED | OUTPATIENT
Start: 2025-01-01 | End: 2025-01-01

## 2025-01-01 RX ORDER — DEXTROSE 50 % IN WATER (D50W) INTRAVENOUS SYRINGE
12.5
Status: DISCONTINUED | OUTPATIENT
Start: 2025-01-01 | End: 2025-01-01 | Stop reason: HOSPADM

## 2025-01-01 RX ADMIN — NOREPINEPHRINE BITARTRATE 0.55 MCG/KG/MIN: 8 INJECTION, SOLUTION INTRAVENOUS at 16:55

## 2025-01-01 RX ADMIN — NOREPINEPHRINE BITARTRATE 0.65 MCG/KG/MIN: 64 SOLUTION INTRAVENOUS at 17:57

## 2025-01-01 RX ADMIN — NOREPINEPHRINE BITARTRATE 0.39 MCG/KG/MIN: 8 INJECTION, SOLUTION INTRAVENOUS at 10:40

## 2025-01-01 RX ADMIN — PROPOFOL 5 MCG/KG/MIN: 10 INJECTION, EMULSION INTRAVENOUS at 20:54

## 2025-01-01 RX ADMIN — INSULIN LISPRO 1 UNITS: 100 INJECTION, SOLUTION INTRAVENOUS; SUBCUTANEOUS at 12:31

## 2025-01-01 RX ADMIN — SODIUM CHLORIDE 150 ML/HR: 900 INJECTION, SOLUTION INTRAVENOUS at 08:44

## 2025-01-01 RX ADMIN — DEXTROSE MONOHYDRATE AND SODIUM CHLORIDE 100 ML/HR: 5; .45 INJECTION, SOLUTION INTRAVENOUS at 09:04

## 2025-01-01 RX ADMIN — ROCURONIUM BROMIDE 150 MG: 10 INJECTION, SOLUTION INTRAVENOUS at 17:52

## 2025-01-01 RX ADMIN — ACETAMINOPHEN 650 MG: 160 SOLUTION ORAL at 22:13

## 2025-01-01 RX ADMIN — NOREPINEPHRINE BITARTRATE 0.63 MCG/KG/MIN: 64 SOLUTION INTRAVENOUS at 22:30

## 2025-01-01 RX ADMIN — NOREPINEPHRINE BITARTRATE 0.52 MCG/KG/MIN: 8 INJECTION, SOLUTION INTRAVENOUS at 15:26

## 2025-01-01 RX ADMIN — SODIUM CHLORIDE 1000 ML: 900 INJECTION, SOLUTION INTRAVENOUS at 07:08

## 2025-01-01 RX ADMIN — NOREPINEPHRINE BITARTRATE 0.17 MCG/KG/MIN: 8 INJECTION, SOLUTION INTRAVENOUS at 05:56

## 2025-01-01 RX ADMIN — Medication 70 PERCENT: at 20:31

## 2025-01-01 RX ADMIN — NOREPINEPHRINE BITARTRATE 0.22 MCG/KG/MIN: 8 INJECTION, SOLUTION INTRAVENOUS at 16:20

## 2025-01-01 RX ADMIN — NOREPINEPHRINE BITARTRATE 0.54 MCG/KG/MIN: 8 INJECTION, SOLUTION INTRAVENOUS at 12:30

## 2025-01-01 RX ADMIN — POTASSIUM PHOSPHATE, MONOBASIC AND POTASSIUM PHOSPHATE, DIBASIC 15 MMOL: 224; 236 INJECTION, SOLUTION, CONCENTRATE INTRAVENOUS at 10:03

## 2025-01-01 RX ADMIN — NOREPINEPHRINE BITARTRATE 0.29 MCG/KG/MIN: 8 INJECTION, SOLUTION INTRAVENOUS at 01:15

## 2025-01-01 RX ADMIN — Medication 100 PERCENT: at 20:54

## 2025-01-01 RX ADMIN — PIPERACILLIN SODIUM AND TAZOBACTAM SODIUM 4.5 G: 4; .5 INJECTION, SOLUTION INTRAVENOUS at 20:53

## 2025-01-01 RX ADMIN — NOREPINEPHRINE BITARTRATE 0.32 MCG/KG/MIN: 8 INJECTION, SOLUTION INTRAVENOUS at 06:21

## 2025-01-01 RX ADMIN — ACETAMINOPHEN 650 MG: 160 SOLUTION ORAL at 23:41

## 2025-01-01 RX ADMIN — PANTOPRAZOLE SODIUM 40 MG: 40 INJECTION, POWDER, FOR SOLUTION INTRAVENOUS at 06:04

## 2025-01-01 RX ADMIN — NOREPINEPHRINE BITARTRATE 0.28 MCG/KG/MIN: 8 INJECTION, SOLUTION INTRAVENOUS at 22:14

## 2025-01-01 RX ADMIN — NOREPINEPHRINE BITARTRATE 0.03 MCG/KG/MIN: 8 INJECTION, SOLUTION INTRAVENOUS at 06:21

## 2025-01-01 RX ADMIN — HYDRALAZINE HYDROCHLORIDE 5 MG: 20 INJECTION INTRAMUSCULAR; INTRAVENOUS at 02:44

## 2025-01-01 RX ADMIN — NOREPINEPHRINE BITARTRATE 0.63 MCG/KG/MIN: 64 SOLUTION INTRAVENOUS at 20:09

## 2025-01-01 RX ADMIN — SODIUM CHLORIDE 225 ML/HR: 900 INJECTION, SOLUTION INTRAVENOUS at 20:03

## 2025-01-01 RX ADMIN — NOREPINEPHRINE BITARTRATE 0.15 MCG/KG/MIN: 8 INJECTION, SOLUTION INTRAVENOUS at 20:23

## 2025-01-01 RX ADMIN — INSULIN LISPRO 1 UNITS: 100 INJECTION, SOLUTION INTRAVENOUS; SUBCUTANEOUS at 11:54

## 2025-01-01 RX ADMIN — INSULIN LISPRO 1 UNITS: 100 INJECTION, SOLUTION INTRAVENOUS; SUBCUTANEOUS at 16:40

## 2025-01-01 RX ADMIN — INSULIN LISPRO 1 UNITS: 100 INJECTION, SOLUTION INTRAVENOUS; SUBCUTANEOUS at 03:40

## 2025-01-01 RX ADMIN — NOREPINEPHRINE BITARTRATE 0.58 MCG/KG/MIN: 64 SOLUTION INTRAVENOUS at 05:25

## 2025-01-01 RX ADMIN — VASOPRESSIN 0.03 UNITS/MIN: 0.2 INJECTION INTRAVENOUS at 03:14

## 2025-01-01 RX ADMIN — Medication 100 PERCENT: at 21:00

## 2025-01-01 RX ADMIN — SODIUM CHLORIDE, SODIUM LACTATE, POTASSIUM CHLORIDE, AND CALCIUM CHLORIDE 1000 ML: 600; 310; 30; 20 INJECTION, SOLUTION INTRAVENOUS at 14:16

## 2025-01-01 RX ADMIN — SODIUM CHLORIDE 150 ML/HR: 900 INJECTION, SOLUTION INTRAVENOUS at 15:31

## 2025-01-01 RX ADMIN — NOREPINEPHRINE BITARTRATE 0.18 MCG/KG/MIN: 8 INJECTION, SOLUTION INTRAVENOUS at 10:28

## 2025-01-01 RX ADMIN — PROPOFOL 15 MCG/KG/MIN: 10 INJECTION, EMULSION INTRAVENOUS at 02:34

## 2025-01-01 RX ADMIN — ACETAMINOPHEN 650 MG: 160 SOLUTION ORAL at 10:48

## 2025-01-01 RX ADMIN — NOREPINEPHRINE BITARTRATE 0.32 MCG/KG/MIN: 8 INJECTION, SOLUTION INTRAVENOUS at 03:52

## 2025-01-01 RX ADMIN — NOREPINEPHRINE BITARTRATE 0.5 MCG/KG/MIN: 64 SOLUTION INTRAVENOUS at 10:43

## 2025-01-01 RX ADMIN — SODIUM CHLORIDE 1000 ML: 900 INJECTION, SOLUTION INTRAVENOUS at 06:10

## 2025-01-01 RX ADMIN — Medication 100 PERCENT: at 08:10

## 2025-01-01 RX ADMIN — INSULIN LISPRO 1 UNITS: 100 INJECTION, SOLUTION INTRAVENOUS; SUBCUTANEOUS at 08:03

## 2025-01-01 RX ADMIN — NOREPINEPHRINE BITARTRATE 0.33 MCG/KG/MIN: 8 INJECTION, SOLUTION INTRAVENOUS at 08:42

## 2025-01-01 RX ADMIN — NOREPINEPHRINE BITARTRATE 0.59 MCG/KG/MIN: 64 SOLUTION INTRAVENOUS at 03:06

## 2025-01-01 RX ADMIN — Medication 100 PERCENT: at 19:25

## 2025-01-01 RX ADMIN — PANTOPRAZOLE SODIUM 40 MG: 40 INJECTION, POWDER, FOR SOLUTION INTRAVENOUS at 06:33

## 2025-01-01 RX ADMIN — HYDRALAZINE HYDROCHLORIDE 5 MG: 20 INJECTION INTRAMUSCULAR; INTRAVENOUS at 02:31

## 2025-01-01 RX ADMIN — Medication 70 PERCENT: at 07:01

## 2025-01-01 RX ADMIN — SODIUM CHLORIDE 225 ML/HR: 900 INJECTION, SOLUTION INTRAVENOUS at 01:21

## 2025-01-01 RX ADMIN — Medication 70 PERCENT: at 07:13

## 2025-01-01 RX ADMIN — NOREPINEPHRINE BITARTRATE 0.53 MCG/KG/MIN: 8 INJECTION, SOLUTION INTRAVENOUS at 13:59

## 2025-01-01 RX ADMIN — MAGNESIUM SULFATE IN DEXTROSE 1 G: 10 INJECTION, SOLUTION INTRAVENOUS at 01:17

## 2025-01-01 RX ADMIN — NOREPINEPHRINE BITARTRATE 0.16 MCG/KG/MIN: 8 INJECTION, SOLUTION INTRAVENOUS at 01:21

## 2025-01-01 RX ADMIN — Medication 25 MCG/HR: at 03:18

## 2025-01-01 RX ADMIN — ONDANSETRON 4 MG: 2 INJECTION INTRAMUSCULAR; INTRAVENOUS at 03:14

## 2025-01-01 RX ADMIN — POTASSIUM CHLORIDE 40 MEQ: 1.5 POWDER, FOR SOLUTION ORAL at 08:55

## 2025-01-01 RX ADMIN — PANTOPRAZOLE SODIUM 40 MG: 40 INJECTION, POWDER, FOR SOLUTION INTRAVENOUS at 06:21

## 2025-01-01 RX ADMIN — ACETAMINOPHEN 650 MG: 160 SOLUTION ORAL at 17:56

## 2025-01-01 RX ADMIN — INSULIN LISPRO 1 UNITS: 100 INJECTION, SOLUTION INTRAVENOUS; SUBCUTANEOUS at 08:55

## 2025-01-01 RX ADMIN — DEXTROSE MONOHYDRATE AND SODIUM CHLORIDE 100 ML/HR: 5; .45 INJECTION, SOLUTION INTRAVENOUS at 22:27

## 2025-01-01 RX ADMIN — VASOPRESSIN 0.03 UNITS/MIN: 0.2 INJECTION INTRAVENOUS at 19:04

## 2025-01-01 RX ADMIN — ETOMIDATE 40 MG: 2 INJECTION, SOLUTION INTRAVENOUS at 17:51

## 2025-01-01 RX ADMIN — Medication 100 PERCENT: at 06:51

## 2025-01-01 RX ADMIN — IOHEXOL 120 ML: 350 INJECTION, SOLUTION INTRAVENOUS at 18:35

## 2025-01-01 RX ADMIN — Medication 60 PERCENT: at 19:46

## 2025-01-01 RX ADMIN — INSULIN LISPRO 1 UNITS: 100 INJECTION, SOLUTION INTRAVENOUS; SUBCUTANEOUS at 16:31

## 2025-01-01 RX ADMIN — SODIUM CHLORIDE, SODIUM LACTATE, POTASSIUM CHLORIDE, AND CALCIUM CHLORIDE 500 ML: 600; 310; 30; 20 INJECTION, SOLUTION INTRAVENOUS at 10:46

## 2025-01-01 RX ADMIN — NOREPINEPHRINE BITARTRATE 0.59 MCG/KG/MIN: 64 SOLUTION INTRAVENOUS at 00:49

## 2025-01-01 RX ADMIN — PANTOPRAZOLE SODIUM 40 MG: 40 INJECTION, POWDER, FOR SOLUTION INTRAVENOUS at 06:05

## 2025-01-01 RX ADMIN — SODIUM CHLORIDE, SODIUM LACTATE, POTASSIUM CHLORIDE, AND CALCIUM CHLORIDE 500 ML: 600; 310; 30; 20 INJECTION, SOLUTION INTRAVENOUS at 02:31

## 2025-01-01 RX ADMIN — NOREPINEPHRINE BITARTRATE 0.15 MCG/KG/MIN: 8 INJECTION, SOLUTION INTRAVENOUS at 16:00

## 2025-01-01 RX ADMIN — CALCIUM GLUCONATE 1 G: 20 INJECTION, SOLUTION INTRAVENOUS at 08:56

## 2025-01-01 RX ADMIN — PROPOFOL 5 MCG/KG/MIN: 10 INJECTION, EMULSION INTRAVENOUS at 17:59

## 2025-01-01 RX ADMIN — NOREPINEPHRINE BITARTRATE 0.25 MCG/KG/MIN: 8 INJECTION, SOLUTION INTRAVENOUS at 19:19

## 2025-01-01 RX ADMIN — NOREPINEPHRINE BITARTRATE 0.53 MCG/KG/MIN: 64 SOLUTION INTRAVENOUS at 07:46

## 2025-01-01 RX ADMIN — NOREPINEPHRINE BITARTRATE 0.22 MCG/KG/MIN: 8 INJECTION, SOLUTION INTRAVENOUS at 13:40

## 2025-01-01 RX ADMIN — ROSUVASTATIN 20 MG: 20 TABLET, FILM COATED ORAL at 20:23

## 2025-01-01 SDOH — SOCIAL STABILITY: SOCIAL NETWORK: HOW OFTEN DO YOU GET TOGETHER WITH FRIENDS OR RELATIVES?: PATIENT UNABLE TO ANSWER

## 2025-01-01 SDOH — HEALTH STABILITY: MENTAL HEALTH
DO YOU FEEL STRESS - TENSE, RESTLESS, NERVOUS, OR ANXIOUS, OR UNABLE TO SLEEP AT NIGHT BECAUSE YOUR MIND IS TROUBLED ALL THE TIME - THESE DAYS?: PATIENT UNABLE TO ANSWER

## 2025-01-01 SDOH — SOCIAL STABILITY: SOCIAL NETWORK: IN A TYPICAL WEEK, HOW MANY TIMES DO YOU TALK ON THE PHONE WITH FAMILY, FRIENDS, OR NEIGHBORS?: PATIENT UNABLE TO ANSWER

## 2025-01-01 SDOH — SOCIAL STABILITY: SOCIAL INSECURITY: WITHIN THE LAST YEAR, HAVE YOU BEEN AFRAID OF YOUR PARTNER OR EX-PARTNER?: PATIENT UNABLE TO ANSWER

## 2025-01-01 SDOH — SOCIAL STABILITY: SOCIAL INSECURITY: ARE YOU MARRIED, WIDOWED, DIVORCED, SEPARATED, NEVER MARRIED, OR LIVING WITH A PARTNER?: PATIENT UNABLE TO ANSWER

## 2025-01-01 SDOH — SOCIAL STABILITY: SOCIAL INSECURITY
WITHIN THE LAST YEAR, HAVE YOU BEEN HUMILIATED OR EMOTIONALLY ABUSED IN OTHER WAYS BY YOUR PARTNER OR EX-PARTNER?: PATIENT UNABLE TO ANSWER

## 2025-01-01 SDOH — HEALTH STABILITY: PHYSICAL HEALTH: ON AVERAGE, HOW MANY MINUTES DO YOU ENGAGE IN EXERCISE AT THIS LEVEL?: PATIENT UNABLE TO ANSWER

## 2025-01-01 SDOH — HEALTH STABILITY: PHYSICAL HEALTH
HOW OFTEN DO YOU NEED TO HAVE SOMEONE HELP YOU WHEN YOU READ INSTRUCTIONS, PAMPHLETS, OR OTHER WRITTEN MATERIAL FROM YOUR DOCTOR OR PHARMACY?: PATIENT UNABLE TO RESPOND

## 2025-01-01 SDOH — ECONOMIC STABILITY: HOUSING INSECURITY: AT ANY TIME IN THE PAST 12 MONTHS, WERE YOU HOMELESS OR LIVING IN A SHELTER (INCLUDING NOW)?: PATIENT UNABLE TO ANSWER

## 2025-01-01 SDOH — SOCIAL STABILITY: SOCIAL NETWORK: HOW OFTEN DO YOU ATTEND CHURCH OR RELIGIOUS SERVICES?: PATIENT UNABLE TO ANSWER

## 2025-01-01 SDOH — SOCIAL STABILITY: SOCIAL INSECURITY: ARE YOU OR HAVE YOU BEEN THREATENED OR ABUSED PHYSICALLY, EMOTIONALLY, OR SEXUALLY BY ANYONE?: UNABLE TO ASSESS

## 2025-01-01 SDOH — SOCIAL STABILITY: SOCIAL INSECURITY: ABUSE: ADULT

## 2025-01-01 SDOH — SOCIAL STABILITY: SOCIAL INSECURITY: DO YOU FEEL UNSAFE GOING BACK TO THE PLACE WHERE YOU ARE LIVING?: UNABLE TO ASSESS

## 2025-01-01 SDOH — ECONOMIC STABILITY: FOOD INSECURITY
WITHIN THE PAST 12 MONTHS, THE FOOD YOU BOUGHT JUST DIDN'T LAST AND YOU DIDN'T HAVE MONEY TO GET MORE.: PATIENT UNABLE TO ANSWER

## 2025-01-01 SDOH — ECONOMIC STABILITY: INCOME INSECURITY
IN THE PAST 12 MONTHS HAS THE ELECTRIC, GAS, OIL, OR WATER COMPANY THREATENED TO SHUT OFF SERVICES IN YOUR HOME?: PATIENT UNABLE TO ANSWER

## 2025-01-01 SDOH — SOCIAL STABILITY: SOCIAL INSECURITY: DOES ANYONE TRY TO KEEP YOU FROM HAVING/CONTACTING OTHER FRIENDS OR DOING THINGS OUTSIDE YOUR HOME?: UNABLE TO ASSESS

## 2025-01-01 SDOH — SOCIAL STABILITY: SOCIAL INSECURITY: DO YOU FEEL ANYONE HAS EXPLOITED OR TAKEN ADVANTAGE OF YOU FINANCIALLY OR OF YOUR PERSONAL PROPERTY?: UNABLE TO ASSESS

## 2025-01-01 SDOH — ECONOMIC STABILITY: FOOD INSECURITY
WITHIN THE PAST 12 MONTHS, YOU WORRIED THAT YOUR FOOD WOULD RUN OUT BEFORE YOU GOT THE MONEY TO BUY MORE.: PATIENT UNABLE TO ANSWER

## 2025-01-01 SDOH — SOCIAL STABILITY: SOCIAL INSECURITY
WITHIN THE LAST YEAR, HAVE YOU BEEN RAPED OR FORCED TO HAVE ANY KIND OF SEXUAL ACTIVITY BY YOUR PARTNER OR EX-PARTNER?: PATIENT UNABLE TO ANSWER

## 2025-01-01 SDOH — ECONOMIC STABILITY: HOUSING INSECURITY
IN THE LAST 12 MONTHS, WAS THERE A TIME WHEN YOU WERE NOT ABLE TO PAY THE MORTGAGE OR RENT ON TIME?: PATIENT UNABLE TO ANSWER

## 2025-01-01 SDOH — HEALTH STABILITY: PHYSICAL HEALTH
ON AVERAGE, HOW MANY DAYS PER WEEK DO YOU ENGAGE IN MODERATE TO STRENUOUS EXERCISE (LIKE A BRISK WALK)?: PATIENT UNABLE TO ANSWER

## 2025-01-01 SDOH — SOCIAL STABILITY: SOCIAL INSECURITY
WITHIN THE LAST YEAR, HAVE YOU BEEN KICKED, HIT, SLAPPED, OR OTHERWISE PHYSICALLY HURT BY YOUR PARTNER OR EX-PARTNER?: PATIENT UNABLE TO ANSWER

## 2025-01-01 SDOH — SOCIAL STABILITY: SOCIAL NETWORK: HOW OFTEN DO YOU ATTEND MEETINGS OF THE CLUBS OR ORGANIZATIONS YOU BELONG TO?: PATIENT UNABLE TO ANSWER

## 2025-01-01 SDOH — SOCIAL STABILITY: SOCIAL INSECURITY: WERE YOU ABLE TO COMPLETE ALL THE BEHAVIORAL HEALTH SCREENINGS?: NO

## 2025-01-01 SDOH — SOCIAL STABILITY: SOCIAL INSECURITY: HAS ANYONE EVER THREATENED TO HURT YOUR FAMILY OR YOUR PETS?: UNABLE TO ASSESS

## 2025-01-01 SDOH — ECONOMIC STABILITY: FOOD INSECURITY
HOW HARD IS IT FOR YOU TO PAY FOR THE VERY BASICS LIKE FOOD, HOUSING, MEDICAL CARE, AND HEATING?: PATIENT UNABLE TO ANSWER

## 2025-01-01 SDOH — SOCIAL STABILITY: SOCIAL NETWORK
DO YOU BELONG TO ANY CLUBS OR ORGANIZATIONS SUCH AS CHURCH GROUPS, UNIONS, FRATERNAL OR ATHLETIC GROUPS, OR SCHOOL GROUPS?: PATIENT UNABLE TO ANSWER

## 2025-01-01 SDOH — SOCIAL STABILITY: SOCIAL INSECURITY: HAVE YOU HAD THOUGHTS OF HARMING ANYONE ELSE?: UNABLE TO ASSESS

## 2025-01-01 SDOH — ECONOMIC STABILITY: TRANSPORTATION INSECURITY
IN THE PAST 12 MONTHS, HAS LACK OF TRANSPORTATION KEPT YOU FROM MEDICAL APPOINTMENTS OR FROM GETTING MEDICATIONS?: PATIENT UNABLE TO ANSWER

## 2025-01-01 SDOH — SOCIAL STABILITY: SOCIAL INSECURITY: HAVE YOU HAD ANY THOUGHTS OF HARMING ANYONE ELSE?: UNABLE TO ASSESS

## 2025-01-01 SDOH — SOCIAL STABILITY: SOCIAL INSECURITY: ARE THERE ANY APPARENT SIGNS OF INJURIES/BEHAVIORS THAT COULD BE RELATED TO ABUSE/NEGLECT?: UNABLE TO ASSESS

## 2025-01-01 ASSESSMENT — LIFESTYLE VARIABLES
AUDIT-C TOTAL SCORE: -1
AUDIT-C TOTAL SCORE: -1
HOW OFTEN DO YOU HAVE A DRINK CONTAINING ALCOHOL: PATIENT UNABLE TO ANSWER
HOW MANY STANDARD DRINKS CONTAINING ALCOHOL DO YOU HAVE ON A TYPICAL DAY: PATIENT UNABLE TO ANSWER
HOW OFTEN DO YOU HAVE 6 OR MORE DRINKS ON ONE OCCASION: PATIENT UNABLE TO ANSWER
SKIP TO QUESTIONS 9-10: 0

## 2025-01-01 ASSESSMENT — ACTIVITIES OF DAILY LIVING (ADL)
LACK_OF_TRANSPORTATION: PATIENT UNABLE TO ANSWER
LACK_OF_TRANSPORTATION: PATIENT UNABLE TO ANSWER
HEARING - LEFT EAR: UNABLE TO ASSESS
PATIENT'S MEMORY ADEQUATE TO SAFELY COMPLETE DAILY ACTIVITIES?: UNABLE TO ASSESS
BATHING: UNABLE TO ASSESS
HEARING - RIGHT EAR: UNABLE TO ASSESS
ADEQUATE_TO_COMPLETE_ADL: UNABLE TO ASSESS
WALKS IN HOME: UNABLE TO ASSESS
GROOMING: UNABLE TO ASSESS
JUDGMENT_ADEQUATE_SAFELY_COMPLETE_DAILY_ACTIVITIES: UNABLE TO ASSESS
DRESSING YOURSELF: UNABLE TO ASSESS
TOILETING: UNABLE TO ASSESS
FEEDING YOURSELF: UNABLE TO ASSESS

## 2025-01-01 ASSESSMENT — PAIN - FUNCTIONAL ASSESSMENT

## 2025-01-01 ASSESSMENT — COLUMBIA-SUICIDE SEVERITY RATING SCALE - C-SSRS
2. HAVE YOU ACTUALLY HAD ANY THOUGHTS OF KILLING YOURSELF?: NO
1. IN THE PAST MONTH, HAVE YOU WISHED YOU WERE DEAD OR WISHED YOU COULD GO TO SLEEP AND NOT WAKE UP?: NO
6. HAVE YOU EVER DONE ANYTHING, STARTED TO DO ANYTHING, OR PREPARED TO DO ANYTHING TO END YOUR LIFE?: NO

## 2025-01-01 ASSESSMENT — PATIENT HEALTH QUESTIONNAIRE - PHQ9
2. FEELING DOWN, DEPRESSED OR HOPELESS: NOT AT ALL
1. LITTLE INTEREST OR PLEASURE IN DOING THINGS: NOT AT ALL
SUM OF ALL RESPONSES TO PHQ9 QUESTIONS 1 & 2: 0

## 2025-01-01 ASSESSMENT — PAIN SCALES - GENERAL
PAINLEVEL_OUTOF10: 0 - NO PAIN

## 2025-01-01 ASSESSMENT — PAIN SCALES - WONG BAKER: WONGBAKER_NUMERICALRESPONSE: NO HURT

## 2025-01-01 ASSESSMENT — COGNITIVE AND FUNCTIONAL STATUS - GENERAL: PATIENT BASELINE BEDBOUND: UNABLE TO ASSESS AT THIS TIME

## 2025-01-13 ENCOUNTER — OFFICE VISIT (OUTPATIENT)
Dept: PRIMARY CARE | Facility: CLINIC | Age: 55
End: 2025-01-13
Payer: COMMERCIAL

## 2025-01-13 VITALS
DIASTOLIC BLOOD PRESSURE: 86 MMHG | HEIGHT: 78 IN | BODY MASS INDEX: 36.45 KG/M2 | SYSTOLIC BLOOD PRESSURE: 178 MMHG | OXYGEN SATURATION: 96 % | HEART RATE: 75 BPM | WEIGHT: 315 LBS

## 2025-01-13 DIAGNOSIS — J20.9 BRONCHITIS WITH BRONCHOSPASM: Primary | ICD-10-CM

## 2025-01-13 DIAGNOSIS — R05.1 ACUTE COUGH: ICD-10-CM

## 2025-01-13 DIAGNOSIS — I10 PRIMARY HYPERTENSION: ICD-10-CM

## 2025-01-13 PROCEDURE — 3008F BODY MASS INDEX DOCD: CPT | Performed by: FAMILY MEDICINE

## 2025-01-13 PROCEDURE — 3077F SYST BP >= 140 MM HG: CPT | Performed by: FAMILY MEDICINE

## 2025-01-13 PROCEDURE — 3079F DIAST BP 80-89 MM HG: CPT | Performed by: FAMILY MEDICINE

## 2025-01-13 PROCEDURE — 99214 OFFICE O/P EST MOD 30 MIN: CPT | Performed by: FAMILY MEDICINE

## 2025-01-13 PROCEDURE — 4004F PT TOBACCO SCREEN RCVD TLK: CPT | Performed by: FAMILY MEDICINE

## 2025-01-13 RX ORDER — PREDNISONE 20 MG/1
TABLET ORAL
Qty: 10 TABLET | Refills: 0 | Status: SHIPPED | OUTPATIENT
Start: 2025-01-13

## 2025-01-13 RX ORDER — BENZONATATE 200 MG/1
200 CAPSULE ORAL 3 TIMES DAILY PRN
Qty: 30 CAPSULE | Refills: 0 | Status: SHIPPED | OUTPATIENT
Start: 2025-01-13 | End: 2025-01-23

## 2025-01-13 RX ORDER — DOXYCYCLINE 100 MG/1
100 CAPSULE ORAL 2 TIMES DAILY
Qty: 20 CAPSULE | Refills: 0 | Status: SHIPPED | OUTPATIENT
Start: 2025-01-13 | End: 2025-01-23

## 2025-01-13 ASSESSMENT — ENCOUNTER SYMPTOMS
SINUS PAIN: 1
SORE THROAT: 1
SHORTNESS OF BREATH: 0
FEVER: 0
DIZZINESS: 0
FREQUENCY: 0
RHINORRHEA: 1
DIARRHEA: 0
NAUSEA: 0
VOMITING: 0
FATIGUE: 1
SWOLLEN GLANDS: 0
WHEEZING: 1
WEAKNESS: 0
NERVOUS/ANXIOUS: 0
PALPITATIONS: 0
HEADACHES: 0
CHILLS: 0
TREMORS: 0
CONSTIPATION: 0
CONFUSION: 0
HEMATURIA: 0
COUGH: 1

## 2025-01-13 ASSESSMENT — PATIENT HEALTH QUESTIONNAIRE - PHQ9
SUM OF ALL RESPONSES TO PHQ9 QUESTIONS 1 AND 2: 0
2. FEELING DOWN, DEPRESSED OR HOPELESS: NOT AT ALL
1. LITTLE INTEREST OR PLEASURE IN DOING THINGS: NOT AT ALL

## 2025-01-13 ASSESSMENT — PAIN SCALES - GENERAL: PAINLEVEL_OUTOF10: 5

## 2025-01-13 NOTE — PROGRESS NOTES
"Subjective   Patient ID: Mu Escalante is a 54 y.o. male who presents for URI.    Worsening cough with wheezing even with symbicort and albuterol. No fever - coughing is worse.    Hypertension- he admits he is not taking his BP meds consistently - strongly recommend he get back on his BP meds and recommend he get his pending labs that his PCP ordered last month. Call if his BP remains elevated    URI   This is a recurrent problem. The current episode started in the past 7 days. The problem has been gradually worsening. There has been no fever. Associated symptoms include congestion, coughing, a plugged ear sensation, rhinorrhea, sinus pain, a sore throat and wheezing. Pertinent negatives include no chest pain, diarrhea, ear pain, headaches, joint pain, nausea, rash, sneezing, swollen glands or vomiting.        Review of Systems   Constitutional:  Positive for fatigue. Negative for chills and fever.   HENT:  Positive for congestion, rhinorrhea, sinus pain and sore throat. Negative for ear pain, postnasal drip and sneezing.    Respiratory:  Positive for cough and wheezing. Negative for shortness of breath.    Cardiovascular:  Negative for chest pain, palpitations and leg swelling.   Gastrointestinal:  Negative for constipation, diarrhea, nausea and vomiting.   Genitourinary:  Negative for frequency and hematuria.   Musculoskeletal:  Negative for joint pain.   Skin:  Negative for rash.   Neurological:  Negative for dizziness, tremors, weakness and headaches.   Psychiatric/Behavioral:  Negative for confusion. The patient is not nervous/anxious.        Objective   /86   Pulse 75   Ht 1.981 m (6' 5.99\")   Wt (!) 175 kg (385 lb)   SpO2 96%   BMI 44.50 kg/m²     Physical Exam  Vitals reviewed.   Constitutional:       General: He is not in acute distress.     Appearance: Normal appearance. He is not ill-appearing.   HENT:      Right Ear: Tympanic membrane normal.      Left Ear: Tympanic membrane normal.      " Nose: Congestion and rhinorrhea present.      Mouth/Throat:      Pharynx: Posterior oropharyngeal erythema present. No oropharyngeal exudate.   Eyes:      General:         Right eye: No discharge.         Left eye: Discharge present.  Cardiovascular:      Rate and Rhythm: Normal rate and regular rhythm.      Heart sounds: Normal heart sounds. No murmur heard.  Pulmonary:      Effort: Pulmonary effort is normal.      Breath sounds: Wheezing and rhonchi present.   Musculoskeletal:      Right lower leg: No edema.      Left lower leg: No edema.   Skin:     Findings: No rash.   Neurological:      General: No focal deficit present.      Mental Status: He is alert and oriented to person, place, and time.   Psychiatric:         Mood and Affect: Mood normal.         Behavior: Behavior normal.         Assessment/Plan   Problem List Items Addressed This Visit    None  Visit Diagnoses         Codes    Bronchitis with bronchospasm    -  Primary J20.9    recommend a chest xray - he declines - he has a neg covid test at home     Relevant Medications    predniSONE (Deltasone) 20 mg tablet    doxycycline (Vibramycin) 100 mg capsule    Primary hypertension     I10    get back on your daily BP meds and recommend to get your pending labs done asap     Acute cough     R05.1    mucinex DM, fluids, rest     Relevant Medications    benzonatate (Tessalon) 200 mg capsule

## 2025-01-27 DIAGNOSIS — J45.909 PERSISTENT ASTHMA WITHOUT COMPLICATION, UNSPECIFIED ASTHMA SEVERITY (HHS-HCC): ICD-10-CM

## 2025-01-27 RX ORDER — BUDESONIDE AND FORMOTEROL FUMARATE DIHYDRATE 80; 4.5 UG/1; UG/1
2 AEROSOL RESPIRATORY (INHALATION) EVERY 12 HOURS
Qty: 30.6 G | Refills: 3 | Status: SHIPPED | OUTPATIENT
Start: 2025-01-27

## 2025-01-27 NOTE — TELEPHONE ENCOUNTER
Patient states he has been unable to fill the symbicort. Spoke with pharmacy who states insurance will cover generic. ISMAEL was selected on RX. Please resend without ISMAEL to [facility] [place].

## 2025-02-10 ENCOUNTER — OFFICE VISIT (OUTPATIENT)
Dept: PRIMARY CARE | Facility: CLINIC | Age: 55
End: 2025-02-10
Payer: COMMERCIAL

## 2025-02-10 VITALS
WEIGHT: 315 LBS | BODY MASS INDEX: 36.45 KG/M2 | SYSTOLIC BLOOD PRESSURE: 168 MMHG | HEART RATE: 78 BPM | HEIGHT: 78 IN | OXYGEN SATURATION: 95 % | DIASTOLIC BLOOD PRESSURE: 102 MMHG

## 2025-02-10 DIAGNOSIS — N49.2 CELLULITIS, SCROTUM: Primary | ICD-10-CM

## 2025-02-10 DIAGNOSIS — I10 ESSENTIAL HYPERTENSION: ICD-10-CM

## 2025-02-10 PROCEDURE — 3008F BODY MASS INDEX DOCD: CPT | Performed by: PHYSICIAN ASSISTANT

## 2025-02-10 PROCEDURE — 3077F SYST BP >= 140 MM HG: CPT | Performed by: PHYSICIAN ASSISTANT

## 2025-02-10 PROCEDURE — 4004F PT TOBACCO SCREEN RCVD TLK: CPT | Performed by: PHYSICIAN ASSISTANT

## 2025-02-10 PROCEDURE — 3080F DIAST BP >= 90 MM HG: CPT | Performed by: PHYSICIAN ASSISTANT

## 2025-02-10 PROCEDURE — 99213 OFFICE O/P EST LOW 20 MIN: CPT | Performed by: PHYSICIAN ASSISTANT

## 2025-02-10 RX ORDER — NEBIVOLOL 20 MG/1
20 TABLET ORAL DAILY
Qty: 90 TABLET | Refills: 1 | Status: SHIPPED | OUTPATIENT
Start: 2025-02-10

## 2025-02-10 RX ORDER — MUPIROCIN 20 MG/G
OINTMENT TOPICAL 3 TIMES DAILY
Qty: 22 G | Refills: 0 | Status: SHIPPED | OUTPATIENT
Start: 2025-02-10 | End: 2025-02-20

## 2025-02-10 RX ORDER — SULFAMETHOXAZOLE AND TRIMETHOPRIM 800; 160 MG/1; MG/1
1 TABLET ORAL 2 TIMES DAILY
Qty: 20 TABLET | Refills: 0 | Status: SHIPPED | OUTPATIENT
Start: 2025-02-10 | End: 2025-02-20

## 2025-02-10 ASSESSMENT — PATIENT HEALTH QUESTIONNAIRE - PHQ9
1. LITTLE INTEREST OR PLEASURE IN DOING THINGS: NOT AT ALL
SUM OF ALL RESPONSES TO PHQ9 QUESTIONS 1 AND 2: 0
2. FEELING DOWN, DEPRESSED OR HOPELESS: NOT AT ALL

## 2025-02-10 ASSESSMENT — PAIN SCALES - GENERAL: PAINLEVEL_OUTOF10: 0-NO PAIN

## 2025-02-10 NOTE — PROGRESS NOTES
"Subjective   Patient ID: Mu Escalante is a 54 y.o. male who presents for Cyst (Cyst ruptured Saturday).    Presents for c/o abscess to scrotum which worsened over the weekend and ruptured, draining. Has bee using gauze dressing to absorb drainage.   Had fever 2 days ago.          Review of Systems   All other systems reviewed and are negative.      Objective   BP (!) 168/102   Pulse 78   Ht 1.981 m (6' 6\")   Wt (!) 174 kg (383 lb)   SpO2 95%   BMI 44.26 kg/m²     Physical Exam  Constitutional:       Appearance: He is obese.   Cardiovascular:      Rate and Rhythm: Normal rate and regular rhythm.   Genitourinary:     Comments: R latero inferior scrotum with 1cm open wound and swelling, drainage.   Neurological:      Mental Status: He is alert.         Assessment/Plan   Diagnoses and all orders for this visit:  Cellulitis, scrotum  -     sulfamethoxazole-trimethoprim (Bactrim DS) 800-160 mg tablet; Take 1 tablet by mouth 2 times a day for 10 days.  -     mupirocin (Bactroban) 2 % ointment; Apply topically 3 times a day for 10 days. apply to affected area  Essential hypertension  -     nebivolol (Bystolic) 20 mg tablet; Take 1 tablet (20 mg) by mouth once daily.         "

## 2025-02-21 ENCOUNTER — OFFICE VISIT (OUTPATIENT)
Dept: SURGERY | Facility: CLINIC | Age: 55
End: 2025-02-21
Payer: COMMERCIAL

## 2025-02-21 ENCOUNTER — TELEPHONE (OUTPATIENT)
Dept: SURGERY | Facility: CLINIC | Age: 55
End: 2025-02-21

## 2025-02-21 VITALS — WEIGHT: 315 LBS | HEIGHT: 78 IN | BODY MASS INDEX: 36.45 KG/M2

## 2025-02-21 DIAGNOSIS — L02.91 ABSCESS: Primary | ICD-10-CM

## 2025-02-21 DIAGNOSIS — N49.2 SCROTAL ABSCESS: ICD-10-CM

## 2025-02-21 PROCEDURE — 4004F PT TOBACCO SCREEN RCVD TLK: CPT | Performed by: SURGERY

## 2025-02-21 PROCEDURE — 3008F BODY MASS INDEX DOCD: CPT | Performed by: SURGERY

## 2025-02-21 PROCEDURE — 99213 OFFICE O/P EST LOW 20 MIN: CPT | Performed by: SURGERY

## 2025-02-21 ASSESSMENT — ENCOUNTER SYMPTOMS
DIAPHORESIS: 0
APPETITE CHANGE: 0
DIZZINESS: 0
CONFUSION: 0
CHILLS: 0
FEVER: 0
ACTIVITY CHANGE: 0
SHORTNESS OF BREATH: 0
AGITATION: 0

## 2025-02-24 ENCOUNTER — APPOINTMENT (OUTPATIENT)
Dept: RADIOLOGY | Facility: CLINIC | Age: 55
End: 2025-02-24
Payer: COMMERCIAL

## 2025-02-24 ENCOUNTER — HOSPITAL ENCOUNTER (OUTPATIENT)
Dept: RADIOLOGY | Facility: CLINIC | Age: 55
Discharge: HOME | End: 2025-02-24
Payer: COMMERCIAL

## 2025-02-24 DIAGNOSIS — L02.91 ABSCESS: ICD-10-CM

## 2025-02-24 PROCEDURE — 76857 US EXAM PELVIC LIMITED: CPT | Performed by: STUDENT IN AN ORGANIZED HEALTH CARE EDUCATION/TRAINING PROGRAM

## 2025-02-24 PROCEDURE — 76857 US EXAM PELVIC LIMITED: CPT

## 2025-02-25 ENCOUNTER — TRANSCRIBE ORDERS (OUTPATIENT)
Dept: SURGERY | Facility: CLINIC | Age: 55
End: 2025-02-25
Payer: COMMERCIAL

## 2025-02-25 DIAGNOSIS — L02.91 ABSCESS: ICD-10-CM

## 2025-02-25 DIAGNOSIS — N49.2 SCROTUM, ABSCESS: ICD-10-CM

## 2025-02-25 DIAGNOSIS — N49.2 SCROTAL ABSCESS: ICD-10-CM

## 2025-03-11 DIAGNOSIS — J45.909 PERSISTENT ASTHMA WITHOUT COMPLICATION, UNSPECIFIED ASTHMA SEVERITY (HHS-HCC): ICD-10-CM

## 2025-03-11 RX ORDER — ALBUTEROL SULFATE 90 UG/1
2 INHALANT RESPIRATORY (INHALATION) EVERY 4 HOURS PRN
Qty: 8.5 G | Refills: 1 | Status: SHIPPED | OUTPATIENT
Start: 2025-03-11
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
## 2025-03-12 ENCOUNTER — OFFICE VISIT (OUTPATIENT)
Dept: UROLOGY | Facility: HOSPITAL | Age: 55
End: 2025-03-12
Payer: COMMERCIAL

## 2025-03-12 ENCOUNTER — PREP FOR PROCEDURE (OUTPATIENT)
Dept: UROLOGY | Facility: HOSPITAL | Age: 55
End: 2025-03-12

## 2025-03-12 DIAGNOSIS — N49.2 SCROTAL ABSCESS: Primary | ICD-10-CM

## 2025-03-12 PROCEDURE — G2211 COMPLEX E/M VISIT ADD ON: HCPCS | Performed by: STUDENT IN AN ORGANIZED HEALTH CARE EDUCATION/TRAINING PROGRAM

## 2025-03-12 PROCEDURE — 99204 OFFICE O/P NEW MOD 45 MIN: CPT | Performed by: STUDENT IN AN ORGANIZED HEALTH CARE EDUCATION/TRAINING PROGRAM

## 2025-03-12 PROCEDURE — 99214 OFFICE O/P EST MOD 30 MIN: CPT | Performed by: STUDENT IN AN ORGANIZED HEALTH CARE EDUCATION/TRAINING PROGRAM

## 2025-03-12 PROCEDURE — 87077 CULTURE AEROBIC IDENTIFY: CPT | Performed by: STUDENT IN AN ORGANIZED HEALTH CARE EDUCATION/TRAINING PROGRAM

## 2025-03-12 RX ORDER — AMOXICILLIN AND CLAVULANATE POTASSIUM 875; 125 MG/1; MG/1
1 TABLET, FILM COATED ORAL 2 TIMES DAILY
Qty: 10 TABLET | Refills: 0 | Status: SHIPPED | OUTPATIENT
Start: 2025-03-12 | End: 2025-03-17

## 2025-03-12 RX ORDER — CEFAZOLIN SODIUM 2 G/100ML
2 INJECTION, SOLUTION INTRAVENOUS ONCE
OUTPATIENT
Start: 2025-03-12 | End: 2025-03-12

## 2025-03-20 ENCOUNTER — TELEPHONE (OUTPATIENT)
Dept: UROLOGY | Facility: HOSPITAL | Age: 55
End: 2025-03-20
Payer: COMMERCIAL

## 2025-03-20 ENCOUNTER — TELEPHONE (OUTPATIENT)
Dept: PRIMARY CARE | Facility: CLINIC | Age: 55
End: 2025-03-20
Payer: COMMERCIAL

## 2025-03-20 ENCOUNTER — CLINICAL SUPPORT (OUTPATIENT)
Dept: PREADMISSION TESTING | Facility: HOSPITAL | Age: 55
End: 2025-03-20
Payer: COMMERCIAL

## 2025-03-20 DIAGNOSIS — N49.2 SCROTAL ABSCESS: ICD-10-CM

## 2025-03-20 NOTE — TELEPHONE ENCOUNTER
Spoke with patient; per Dr. Burrell; patient needs to follow up with PCP for antibiotic order for wound culture.    Nai Mcmullen LPN

## 2025-03-20 NOTE — TELEPHONE ENCOUNTER
[facility]  [address], [address]  Phone: [phone]  Fax: [phone]        PROGRESS NOTE      HISTORY OF PRESENT ILLNESS:  The patient is a 19 y.o. male and was seen today for follow up of diffuse back pain x years. A note from ALISSA Bloom dated 8/20/12 was reviewed. He states the pain is localized in the midline of his spine. He states the low back pain has been constant of the past few years. Patent was seen for low back pain and treated with PT NSAIDs and steroidal antiinflammatories. Patient denies change in bowel or bladder habits. Patient denies and pain, numbness, or tingling, radiating into his lower extremities. He denies any recent PT/chiropractor treatment. He denies ant previous spinal surgery or injections. He notes temporary relief after \"cracking\" his back. He denies a history of GI bleeding or ulcers. Preliminary reading of entire, thoracic, lumbar, cervical spine plain films revealed mild thoracolumbar scoliosis, mild disc space narrowing L5/S1. No acute pathology identified. At his last clinical appointment, treatment options were discussed with patient including, surgery, injections, medications, and PT. I started him on Medrol Dosepak. I gave him a prescription for Naproxen following completion of the Medrol Dosepak. I referred patient to PT with an emphasis on HEP. The patient returns today with intermittent right-sided scapular and thoracic spine pain. Pt denies radicular symptoms at this time. He continues to rate pain 5/10. I last saw patient 10/28/16 and was scheduled for a 1-month follow up but failed to do so until today. He reports no relief with physical therapy but did gain slight benefit with TENS unit. Pt does not continue with his HEP.  reviewed. History reviewed. No pertinent past medical history.      Social History     Social History    Marital status: SINGLE     Spouse name: N/A    Number of Pt called stating he was on amoxicillin and its resistant. Pt asking for vancomycin  The Urologist prescribed the pt the amoxicillin and the dr is now on vacation and the pt was told to call his PCP.   children: N/A    Years of education: N/A     Occupational History    Not on file. Social History Main Topics    Smoking status: Never Smoker    Smokeless tobacco: Not on file    Alcohol use No    Drug use: No    Sexual activity: Not on file     Other Topics Concern    Not on file     Social History Narrative       Current Outpatient Prescriptions   Medication Sig Dispense Refill    methylPREDNISolone (MEDROL DOSEPACK) 4 mg tablet Per dose pack instructions 1 Dose Pack 0    naproxen (NAPROSYN) 500 mg tablet Take 1 po BID with food. Start after completion of Medrol dose Pack. 30 Tab 0       No Known Allergies       PHYSICAL EXAMINATION    Visit Vitals    /74    Pulse 72    Ht 6' 3\" (1.905 m)    Wt 201 lb (91.2 kg)    BMI 25.12 kg/m2       CONSTITUTIONAL: NAD, A&O x 3  SENSATION: Intact to light touch throughout  NEURO: Mykel's is negative bilaterally. RANGE OF MOTION: The patient has full passive range of motion in all four extremities. Shoulder AB/Flex Elbow Flex Wrist Ext Elbow Ext Wrist Flex Hand Intrin Tone   Right +4/5 +4/5 +4/5 +4/5 +4/5 +4/5 +4/5   Left +4/5 +4/5 +4/5 +4/5 +4/5 +4/5 +4/5                 ASSESSMENT   Kevan Houser was seen today for follow-up. Diagnoses and all orders for this visit:    Thoracic spine pain  -     REFERRAL TO CHIROPRACTIC          IMPRESSION AND PLAN:  His pain appears to have centralized to the right thoracic spine and scapular area. We discussed the optionof pain management which patient declines. I will refer him to Dr. Kilo Nicole for chiropractic treatment. He should resume his HEP and perform them daily. I will see the patient back on an as-needed basis. Written by Gabrielle Quinteros, as dictated by Ena Nation MD  I examined the patient, reviewed and agree with the note.
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
## 2025-03-21 ENCOUNTER — PRE-ADMISSION TESTING (OUTPATIENT)
Dept: PREADMISSION TESTING | Facility: HOSPITAL | Age: 55
End: 2025-03-21
Payer: COMMERCIAL

## 2025-03-21 ENCOUNTER — DOCUMENTATION (OUTPATIENT)
Dept: PREADMISSION TESTING | Facility: HOSPITAL | Age: 55
End: 2025-03-21

## 2025-03-21 ENCOUNTER — APPOINTMENT (OUTPATIENT)
Dept: LAB | Facility: HOSPITAL | Age: 55
End: 2025-03-21
Payer: COMMERCIAL

## 2025-03-21 VITALS
DIASTOLIC BLOOD PRESSURE: 88 MMHG | TEMPERATURE: 98.3 F | HEART RATE: 71 BPM | WEIGHT: 315 LBS | HEIGHT: 76 IN | RESPIRATION RATE: 20 BRPM | SYSTOLIC BLOOD PRESSURE: 142 MMHG | BODY MASS INDEX: 38.36 KG/M2 | OXYGEN SATURATION: 97 %

## 2025-03-21 DIAGNOSIS — Z01.818 PREOP TESTING: Primary | ICD-10-CM

## 2025-03-21 DIAGNOSIS — Z01.818 ENCOUNTER FOR OTHER PREPROCEDURAL EXAMINATION: Primary | ICD-10-CM

## 2025-03-21 LAB
ANION GAP SERPL CALC-SCNC: 13 MMOL/L (ref 10–20)
BASOPHILS # BLD AUTO: 0.07 X10*3/UL (ref 0–0.1)
BASOPHILS NFR BLD AUTO: 0.7 %
BUN SERPL-MCNC: 11 MG/DL (ref 6–23)
CALCIUM SERPL-MCNC: 9.5 MG/DL (ref 8.6–10.3)
CHLORIDE SERPL-SCNC: 103 MMOL/L (ref 98–107)
CO2 SERPL-SCNC: 29 MMOL/L (ref 21–32)
CREAT SERPL-MCNC: 0.93 MG/DL (ref 0.5–1.3)
EGFRCR SERPLBLD CKD-EPI 2021: >90 ML/MIN/1.73M*2
EOSINOPHIL # BLD AUTO: 0.07 X10*3/UL (ref 0–0.7)
EOSINOPHIL NFR BLD AUTO: 0.7 %
ERYTHROCYTE [DISTWIDTH] IN BLOOD BY AUTOMATED COUNT: 13.1 % (ref 11.5–14.5)
GLUCOSE SERPL-MCNC: 80 MG/DL (ref 74–99)
HCT VFR BLD AUTO: 44.7 % (ref 41–52)
HGB BLD-MCNC: 14.8 G/DL (ref 13.5–17.5)
IMM GRANULOCYTES # BLD AUTO: 0.03 X10*3/UL (ref 0–0.7)
IMM GRANULOCYTES NFR BLD AUTO: 0.3 % (ref 0–0.9)
LYMPHOCYTES # BLD AUTO: 2.32 X10*3/UL (ref 1.2–4.8)
LYMPHOCYTES NFR BLD AUTO: 23.4 %
MCH RBC QN AUTO: 31.3 PG (ref 26–34)
MCHC RBC AUTO-ENTMCNC: 33.1 G/DL (ref 32–36)
MCV RBC AUTO: 95 FL (ref 80–100)
MONOCYTES # BLD AUTO: 0.59 X10*3/UL (ref 0.1–1)
MONOCYTES NFR BLD AUTO: 5.9 %
NEUTROPHILS # BLD AUTO: 6.84 X10*3/UL (ref 1.2–7.7)
NEUTROPHILS NFR BLD AUTO: 69 %
NRBC BLD-RTO: 0 /100 WBCS (ref 0–0)
PLATELET # BLD AUTO: 196 X10*3/UL (ref 150–450)
POTASSIUM SERPL-SCNC: 4 MMOL/L (ref 3.5–5.3)
RBC # BLD AUTO: 4.73 X10*6/UL (ref 4.5–5.9)
SODIUM SERPL-SCNC: 141 MMOL/L (ref 136–145)
WBC # BLD AUTO: 9.9 X10*3/UL (ref 4.4–11.3)

## 2025-03-21 PROCEDURE — 85025 COMPLETE CBC W/AUTO DIFF WBC: CPT

## 2025-03-21 PROCEDURE — 99204 OFFICE O/P NEW MOD 45 MIN: CPT | Performed by: PHYSICIAN ASSISTANT

## 2025-03-21 PROCEDURE — 36415 COLL VENOUS BLD VENIPUNCTURE: CPT

## 2025-03-21 PROCEDURE — 80048 BASIC METABOLIC PNL TOTAL CA: CPT

## 2025-03-21 PROCEDURE — 93005 ELECTROCARDIOGRAM TRACING: CPT | Performed by: PHYSICIAN ASSISTANT

## 2025-03-21 ASSESSMENT — ENCOUNTER SYMPTOMS
DYSPNEA AT REST: 0
HEMOPTYSIS: 0
COUGH: 0
BRUISES/BLEEDS EASILY: 0
DIFFICULTY URINATING: 0
NUMBNESS: 0
DIARRHEA: 0
MYALGIAS: 0
VOMITING: 0
TROUBLE SWALLOWING: 0
VISUAL CHANGE: 0
UNEXPECTED WEIGHT CHANGE: 0
NAUSEA: 0
LIGHT-HEADEDNESS: 0
ABDOMINAL DISTENTION: 0
PALPITATIONS: 0
LIMITED RANGE OF MOTION: 0
SKIN CHANGES: 0
ABDOMINAL PAIN: 0
BLOOD IN STOOL: 0
CONFUSION: 0
RHINORRHEA: 0
WOUND: 1
SHORTNESS OF BREATH: 0
WEAKNESS: 0
CHILLS: 0
NECK STIFFNESS: 0
FEVER: 0
SINUS CONGESTION: 0
DYSURIA: 0
EYE PAIN: 0
ARTHRALGIAS: 1
CONSTIPATION: 0
EYE DISCHARGE: 0
DYSPNEA WITH EXERTION: 0
WHEEZING: 0
NECK PAIN: 0
DOUBLE VISION: 0
EXCESSIVE BLEEDING: 0

## 2025-03-21 NOTE — TELEPHONE ENCOUNTER
Called pt no answer. Only antibiotic sensitive is vancomycin which would have to be IV.   He has taken rifampin in the past which may also work but was not tested at this culture which makes this uncertain and less ideal.

## 2025-03-21 NOTE — PREPROCEDURE INSTRUCTIONS
Medication List            Accurate as of March 21, 2025  2:16 PM. Always use your most recent med list.                albuterol 90 mcg/actuation inhaler  INHALE 2 PUFFS BY MOUTH EVERY 4 HOURS AS NEEDED FOR WHEEZING  Medication Adjustments for Surgery: Take/Use as prescribed     budesonide-formoterol 80-4.5 mcg/actuation inhaler  Commonly known as: Symbicort  Inhale 2 puffs every 12 hours.  Medication Adjustments for Surgery: Take/Use as prescribed     ibuprofen 800 mg tablet  TAKE 1 TABLET BY MOUTH EVERY 8 HOURS WITH FOOD OR MILK AS NEEDED  Additional Medication Adjustments for Surgery: Take last dose 7 days before surgery     lisinopriL-hydrochlorothiazide 20-12.5 mg tablet  TAKE 1 TABLET BY MOUTH EVERY DAY  Notes to patient: HOLD evening prior to surgery and morning of surgery       nebivolol 20 mg tablet  Commonly known as: Bystolic  Take 1 tablet (20 mg) by mouth once daily.  Medication Adjustments for Surgery: Take on the morning of surgery     rosuvastatin 20 mg tablet  Commonly known as: Crestor  TAKE 1 TABLET BY MOUTH EVERY DAY  Medication Adjustments for Surgery: Take on the morning of surgery                            **Concerning above medication instructions, if medication is normally taken at night, continue normal schedule.**  **DO NOT TAKE NIGHT PRIOR AND MORNING OF SURGERY**    CONTACT SURGEON'S OFFICE IF YOU DEVELOP:  * Fever = 100.4 F   * New respiratory symptoms (e.g. cough, shortness of breath, respiratory distress, sore throat)  * Recent loss of taste or smell  *Flu like symptoms such as headache, fatigue or gastrointestinal symptoms  * You develop any open sores, shingles, burning or painful urination   AND/OR:  * You no longer wish to have the surgery.  * Any other personal circumstances change that may lead to the need to cancel or defer this surgery.  *You were admitted to any hospital within one week of your planned procedure.    SMOKING:  *Quitting smoking can make a huge difference  to your health and recovery from surgery.    *If you need help with quitting, call 8-096-QUIT-NOW.    THE DAY OF SURGERY:  *Do not eat any food after midnight the night before surgery.   *You must drink 13.5 ounces of clear liquids (i.e. water, black coffee (no milk or cream), tea, apple juice or electrolyte drinks (gatorade)) 2 hours before your arrival time.  *You may chew gum until 2 hours before your surgery    SURGICAL TIME  *You will be contacted between 2 p.m. and 6 p.m. the business day before your surgery with your arrival time.  *If you haven't received a call by 6pm, call [phone].  *Scheduled surgery times may change and you will be notified if this occurs-check your personal voicemail for any updates.    ON THE MORNING OF SURGERY:  *Wear comfortable, loose fitting clothing.   *Do not use moisturizers, creams, lotions or perfume.  *All jewelry and valuables should be left at home.  *Prosthetic devices such as contact lenses, hearing aids, dentures, eyelash extensions, hairpins and body piercing must be removed before surgery.    BRING WITH YOU:  *Photo ID and insurance card  *Current list of medicines and allergies  *Pacemaker/Defibrillator/Heart stent cards  *CPAP machine and mask  *Slings/splints/crutches  *Copy of your complete Advanced Directive/DHPOA-if applicable  *Neurostimulator implant remote    PARKING AND ARRIVAL:  *Check in at the Main Entrance desk and let them know you are here for surgery.  *You will be directed to the 2nd floor surgical waiting area.    AFTER OUTPATIENT SURGERY:  *A responsible adult MUST accompany you at the time of discharge and stay with you for 24 hours after your surgery.  *You may NOT drive yourself home after surgery.  *You may use a taxi or ride sharing service ([employer], [employer]) to return home ONLY if you are accompanied by a friend or family member.  *Instructions for resuming your medications will be provided by your surgeon.       Preoperative Deep Breathing  Exercises  Why it is important to do deep breathing exercises before my surgery?  Deep breathing exercises strengthen your breathing muscles.  This helps you to recover after your surgery and decreases the chance of breathing complications.  How are the deep breathing exercises done?  Sit straight with your back supported.  Breathe in deeply and slowly through your nose. Your lower rib cage should expand and your abdomen may move forward.  Hold that breath for 3 to 5 seconds.  Breathe out through pursed lips, slowly and completely.  Rest and repeat 10 times every hour while awake.  Rest longer if you become dizzy or lightheaded.
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
## 2025-03-24 LAB
ATRIAL RATE: 67 BPM
P AXIS: 7 DEGREES
P OFFSET: 190 MS
P ONSET: 132 MS
PR INTERVAL: 146 MS
Q ONSET: 205 MS
QRS COUNT: 11 BEATS
QRS DURATION: 106 MS
QT INTERVAL: 456 MS
QTC CALCULATION(BAZETT): 481 MS
QTC FREDERICIA: 473 MS
R AXIS: -72 DEGREES
T AXIS: 24 DEGREES
T OFFSET: 433 MS
VENTRICULAR RATE: 67 BPM

## 2025-03-24 NOTE — TELEPHONE ENCOUNTER
Discussed with patient on 3/22 results - he is clinically stable. Will go to ED with worsening symptoms as he has plan for surgical tx.

## 2025-03-28 ENCOUNTER — ANESTHESIA (OUTPATIENT)
Dept: OPERATING ROOM | Facility: HOSPITAL | Age: 55
End: 2025-03-28
Payer: COMMERCIAL

## 2025-03-28 ENCOUNTER — ANESTHESIA EVENT (OUTPATIENT)
Dept: OPERATING ROOM | Facility: HOSPITAL | Age: 55
End: 2025-03-28
Payer: COMMERCIAL

## 2025-03-28 ENCOUNTER — HOSPITAL ENCOUNTER (OUTPATIENT)
Facility: HOSPITAL | Age: 55
Setting detail: OUTPATIENT SURGERY
Discharge: HOME | End: 2025-03-28
Attending: STUDENT IN AN ORGANIZED HEALTH CARE EDUCATION/TRAINING PROGRAM | Admitting: STUDENT IN AN ORGANIZED HEALTH CARE EDUCATION/TRAINING PROGRAM
Payer: COMMERCIAL

## 2025-03-28 VITALS
TEMPERATURE: 97.3 F | HEART RATE: 65 BPM | OXYGEN SATURATION: 94 % | HEIGHT: 76 IN | RESPIRATION RATE: 18 BRPM | BODY MASS INDEX: 38.36 KG/M2 | DIASTOLIC BLOOD PRESSURE: 82 MMHG | WEIGHT: 315 LBS | SYSTOLIC BLOOD PRESSURE: 148 MMHG

## 2025-03-28 DIAGNOSIS — N49.2 SCROTAL ABSCESS: Primary | ICD-10-CM

## 2025-03-28 PROCEDURE — 87070 CULTURE OTHR SPECIMN AEROBIC: CPT | Mod: AHULAB | Performed by: STUDENT IN AN ORGANIZED HEALTH CARE EDUCATION/TRAINING PROGRAM

## 2025-03-28 PROCEDURE — 2500000005 HC RX 250 GENERAL PHARMACY W/O HCPCS: Performed by: STUDENT IN AN ORGANIZED HEALTH CARE EDUCATION/TRAINING PROGRAM

## 2025-03-28 PROCEDURE — 2500000004 HC RX 250 GENERAL PHARMACY W/ HCPCS (ALT 636 FOR OP/ED): Mod: JZ | Performed by: STUDENT IN AN ORGANIZED HEALTH CARE EDUCATION/TRAINING PROGRAM

## 2025-03-28 PROCEDURE — 3600000007 HC OR TIME - EACH INCREMENTAL 1 MINUTE - PROCEDURE LEVEL TWO: Performed by: STUDENT IN AN ORGANIZED HEALTH CARE EDUCATION/TRAINING PROGRAM

## 2025-03-28 PROCEDURE — 10060 I&D ABSCESS SIMPLE/SINGLE: CPT | Performed by: STUDENT IN AN ORGANIZED HEALTH CARE EDUCATION/TRAINING PROGRAM

## 2025-03-28 PROCEDURE — 88304 TISSUE EXAM BY PATHOLOGIST: CPT | Mod: TC,AHULAB,WESLAB | Performed by: STUDENT IN AN ORGANIZED HEALTH CARE EDUCATION/TRAINING PROGRAM

## 2025-03-28 PROCEDURE — 3600000002 HC OR TIME - INITIAL BASE CHARGE - PROCEDURE LEVEL TWO: Performed by: STUDENT IN AN ORGANIZED HEALTH CARE EDUCATION/TRAINING PROGRAM

## 2025-03-28 PROCEDURE — 3700000001 HC GENERAL ANESTHESIA TIME - INITIAL BASE CHARGE: Performed by: STUDENT IN AN ORGANIZED HEALTH CARE EDUCATION/TRAINING PROGRAM

## 2025-03-28 PROCEDURE — 87102 FUNGUS ISOLATION CULTURE: CPT | Mod: AHULAB | Performed by: STUDENT IN AN ORGANIZED HEALTH CARE EDUCATION/TRAINING PROGRAM

## 2025-03-28 PROCEDURE — 7100000010 HC PHASE TWO TIME - EACH INCREMENTAL 1 MINUTE: Performed by: STUDENT IN AN ORGANIZED HEALTH CARE EDUCATION/TRAINING PROGRAM

## 2025-03-28 PROCEDURE — 7100000001 HC RECOVERY ROOM TIME - INITIAL BASE CHARGE: Performed by: STUDENT IN AN ORGANIZED HEALTH CARE EDUCATION/TRAINING PROGRAM

## 2025-03-28 PROCEDURE — 88304 TISSUE EXAM BY PATHOLOGIST: CPT | Performed by: PATHOLOGY

## 2025-03-28 PROCEDURE — 2500000001 HC RX 250 WO HCPCS SELF ADMINISTERED DRUGS (ALT 637 FOR MEDICARE OP): Performed by: STUDENT IN AN ORGANIZED HEALTH CARE EDUCATION/TRAINING PROGRAM

## 2025-03-28 PROCEDURE — 2500000001 HC RX 250 WO HCPCS SELF ADMINISTERED DRUGS (ALT 637 FOR MEDICARE OP): Performed by: ANESTHESIOLOGIST ASSISTANT

## 2025-03-28 PROCEDURE — 7100000002 HC RECOVERY ROOM TIME - EACH INCREMENTAL 1 MINUTE: Performed by: STUDENT IN AN ORGANIZED HEALTH CARE EDUCATION/TRAINING PROGRAM

## 2025-03-28 PROCEDURE — 7100000009 HC PHASE TWO TIME - INITIAL BASE CHARGE: Performed by: STUDENT IN AN ORGANIZED HEALTH CARE EDUCATION/TRAINING PROGRAM

## 2025-03-28 PROCEDURE — 2720000007 HC OR 272 NO HCPCS: Performed by: STUDENT IN AN ORGANIZED HEALTH CARE EDUCATION/TRAINING PROGRAM

## 2025-03-28 PROCEDURE — 3700000002 HC GENERAL ANESTHESIA TIME - EACH INCREMENTAL 1 MINUTE: Performed by: STUDENT IN AN ORGANIZED HEALTH CARE EDUCATION/TRAINING PROGRAM

## 2025-03-28 PROCEDURE — 55100 DRAINAGE OF SCROTUM ABSCESS: CPT | Performed by: STUDENT IN AN ORGANIZED HEALTH CARE EDUCATION/TRAINING PROGRAM

## 2025-03-28 PROCEDURE — 46050 I&D PERIANAL ABSCESS SUPFC: CPT | Performed by: SURGERY

## 2025-03-28 PROCEDURE — 2500000004 HC RX 250 GENERAL PHARMACY W/ HCPCS (ALT 636 FOR OP/ED): Performed by: ANESTHESIOLOGIST ASSISTANT

## 2025-03-28 RX ORDER — HEPARIN SODIUM 5000 [USP'U]/ML
INJECTION, SOLUTION INTRAVENOUS; SUBCUTANEOUS AS NEEDED
Status: DISCONTINUED | OUTPATIENT
Start: 2025-03-28 | End: 2025-03-28

## 2025-03-28 RX ORDER — LIDOCAINE HYDROCHLORIDE 10 MG/ML
0.1 INJECTION, SOLUTION EPIDURAL; INFILTRATION; INTRACAUDAL; PERINEURAL ONCE
Status: DISCONTINUED | OUTPATIENT
Start: 2025-03-28 | End: 2025-03-28 | Stop reason: HOSPADM

## 2025-03-28 RX ORDER — MIDAZOLAM HYDROCHLORIDE 1 MG/ML
INJECTION INTRAMUSCULAR; INTRAVENOUS AS NEEDED
Status: DISCONTINUED | OUTPATIENT
Start: 2025-03-28 | End: 2025-03-28

## 2025-03-28 RX ORDER — SODIUM CHLORIDE, SODIUM LACTATE, POTASSIUM CHLORIDE, CALCIUM CHLORIDE 600; 310; 30; 20 MG/100ML; MG/100ML; MG/100ML; MG/100ML
100 INJECTION, SOLUTION INTRAVENOUS CONTINUOUS
Status: DISCONTINUED | OUTPATIENT
Start: 2025-03-28 | End: 2025-03-28 | Stop reason: HOSPADM

## 2025-03-28 RX ORDER — LABETALOL HYDROCHLORIDE 5 MG/ML
5 INJECTION, SOLUTION INTRAVENOUS ONCE AS NEEDED
Status: DISCONTINUED | OUTPATIENT
Start: 2025-03-28 | End: 2025-03-28 | Stop reason: HOSPADM

## 2025-03-28 RX ORDER — ROCURONIUM BROMIDE 10 MG/ML
INJECTION, SOLUTION INTRAVENOUS AS NEEDED
Status: DISCONTINUED | OUTPATIENT
Start: 2025-03-28 | End: 2025-03-28

## 2025-03-28 RX ORDER — CLINDAMYCIN HYDROCHLORIDE 300 MG/1
300 CAPSULE ORAL 3 TIMES DAILY
Qty: 21 CAPSULE | Refills: 0 | Status: SHIPPED | OUTPATIENT
Start: 2025-03-28 | End: 2025-04-04

## 2025-03-28 RX ORDER — ALBUTEROL SULFATE 90 UG/1
INHALANT RESPIRATORY (INHALATION) AS NEEDED
Status: DISCONTINUED | OUTPATIENT
Start: 2025-03-28 | End: 2025-03-28

## 2025-03-28 RX ORDER — VANCOMYCIN HYDROCHLORIDE 750 MG/150ML
INJECTION, SOLUTION INTRAVENOUS AS NEEDED
Status: DISCONTINUED | OUTPATIENT
Start: 2025-03-28 | End: 2025-03-28

## 2025-03-28 RX ORDER — DIPHENHYDRAMINE HYDROCHLORIDE 50 MG/ML
12.5 INJECTION, SOLUTION INTRAMUSCULAR; INTRAVENOUS ONCE AS NEEDED
Status: DISCONTINUED | OUTPATIENT
Start: 2025-03-28 | End: 2025-03-28 | Stop reason: HOSPADM

## 2025-03-28 RX ORDER — DEXMEDETOMIDINE IN 0.9 % NACL 20 MCG/5ML
SYRINGE (ML) INTRAVENOUS AS NEEDED
Status: DISCONTINUED | OUTPATIENT
Start: 2025-03-28 | End: 2025-03-28

## 2025-03-28 RX ORDER — DEXTROMETHORPHAN HYDROBROMIDE, GUAIFENESIN 5; 100 MG/5ML; MG/5ML
650 LIQUID ORAL EVERY 8 HOURS PRN
Qty: 15 TABLET | Refills: 1 | Status: SHIPPED | OUTPATIENT
Start: 2025-03-28

## 2025-03-28 RX ORDER — ONDANSETRON HYDROCHLORIDE 2 MG/ML
INJECTION, SOLUTION INTRAVENOUS AS NEEDED
Status: DISCONTINUED | OUTPATIENT
Start: 2025-03-28 | End: 2025-03-28

## 2025-03-28 RX ORDER — ONDANSETRON HYDROCHLORIDE 2 MG/ML
4 INJECTION, SOLUTION INTRAVENOUS ONCE AS NEEDED
Status: DISCONTINUED | OUTPATIENT
Start: 2025-03-28 | End: 2025-03-28 | Stop reason: HOSPADM

## 2025-03-28 RX ORDER — PROPOFOL 10 MG/ML
INJECTION, EMULSION INTRAVENOUS AS NEEDED
Status: DISCONTINUED | OUTPATIENT
Start: 2025-03-28 | End: 2025-03-28

## 2025-03-28 RX ORDER — OXYCODONE HYDROCHLORIDE 5 MG/1
5 TABLET ORAL EVERY 4 HOURS PRN
Status: DISCONTINUED | OUTPATIENT
Start: 2025-03-28 | End: 2025-03-28 | Stop reason: HOSPADM

## 2025-03-28 RX ORDER — VANCOMYCIN HYDROCHLORIDE 1 G/20ML
INJECTION, POWDER, LYOPHILIZED, FOR SOLUTION INTRAVENOUS AS NEEDED
Status: DISCONTINUED | OUTPATIENT
Start: 2025-03-28 | End: 2025-03-28

## 2025-03-28 RX ORDER — FENTANYL CITRATE 50 UG/ML
INJECTION, SOLUTION INTRAMUSCULAR; INTRAVENOUS AS NEEDED
Status: DISCONTINUED | OUTPATIENT
Start: 2025-03-28 | End: 2025-03-28

## 2025-03-28 RX ORDER — LIDOCAINE HYDROCHLORIDE 20 MG/ML
INJECTION, SOLUTION EPIDURAL; INFILTRATION; INTRACAUDAL; PERINEURAL AS NEEDED
Status: DISCONTINUED | OUTPATIENT
Start: 2025-03-28 | End: 2025-03-28

## 2025-03-28 RX ORDER — LABETALOL HYDROCHLORIDE 5 MG/ML
INJECTION, SOLUTION INTRAVENOUS AS NEEDED
Status: DISCONTINUED | OUTPATIENT
Start: 2025-03-28 | End: 2025-03-28

## 2025-03-28 RX ORDER — BACITRACIN 500 [USP'U]/G
OINTMENT TOPICAL AS NEEDED
Status: DISCONTINUED | OUTPATIENT
Start: 2025-03-28 | End: 2025-03-28 | Stop reason: HOSPADM

## 2025-03-28 RX ADMIN — ROCURONIUM BROMIDE 60 MG: 10 INJECTION INTRAVENOUS at 11:18

## 2025-03-28 RX ADMIN — ALBUTEROL SULFATE 5 PUFF: 90 AEROSOL, METERED RESPIRATORY (INHALATION) at 11:25

## 2025-03-28 RX ADMIN — ONDANSETRON 4 MG: 2 INJECTION, SOLUTION INTRAMUSCULAR; INTRAVENOUS at 11:59

## 2025-03-28 RX ADMIN — HYDROMORPHONE HYDROCHLORIDE 0.5 MG: 1 INJECTION, SOLUTION INTRAMUSCULAR; INTRAVENOUS; SUBCUTANEOUS at 12:30

## 2025-03-28 RX ADMIN — SODIUM CHLORIDE, POTASSIUM CHLORIDE, SODIUM LACTATE AND CALCIUM CHLORIDE: 600; 310; 30; 20 INJECTION, SOLUTION INTRAVENOUS at 11:12

## 2025-03-28 RX ADMIN — VANCOMYCIN HYDROCHLORIDE 1 G: 1 INJECTION, POWDER, LYOPHILIZED, FOR SOLUTION INTRAVENOUS at 10:55

## 2025-03-28 RX ADMIN — MIDAZOLAM HYDROCHLORIDE 2 MG: 1 INJECTION, SOLUTION INTRAMUSCULAR; INTRAVENOUS at 11:12

## 2025-03-28 RX ADMIN — FENTANYL CITRATE 50 MCG: 50 INJECTION, SOLUTION INTRAMUSCULAR; INTRAVENOUS at 12:05

## 2025-03-28 RX ADMIN — PROPOFOL 20 MG: 10 INJECTION, EMULSION INTRAVENOUS at 11:49

## 2025-03-28 RX ADMIN — PROPOFOL 180 MG: 10 INJECTION, EMULSION INTRAVENOUS at 11:18

## 2025-03-28 RX ADMIN — LABETALOL HYDROCHLORIDE 5 MG: 5 INJECTION INTRAVENOUS at 11:41

## 2025-03-28 RX ADMIN — VANCOMYCIN HYDROCHLORIDE 750 MG: 750 INJECTION, SOLUTION INTRAVENOUS at 11:46

## 2025-03-28 RX ADMIN — LIDOCAINE HYDROCHLORIDE 100 MG: 20 INJECTION, SOLUTION EPIDURAL; INFILTRATION; INTRACAUDAL; PERINEURAL at 11:18

## 2025-03-28 RX ADMIN — HEPARIN SODIUM 5000 UNITS: 5000 INJECTION INTRAVENOUS; SUBCUTANEOUS at 11:29

## 2025-03-28 RX ADMIN — SUGAMMADEX 400 MG: 100 INJECTION, SOLUTION INTRAVENOUS at 12:05

## 2025-03-28 RX ADMIN — Medication 20 MCG: at 12:03

## 2025-03-28 RX ADMIN — OXYCODONE HYDROCHLORIDE 5 MG: 5 TABLET ORAL at 13:02

## 2025-03-28 RX ADMIN — FENTANYL CITRATE 50 MCG: 50 INJECTION, SOLUTION INTRAMUSCULAR; INTRAVENOUS at 11:37

## 2025-03-28 RX ADMIN — ROCURONIUM BROMIDE 10 MG: 10 INJECTION INTRAVENOUS at 11:49

## 2025-03-28 ASSESSMENT — PAIN - FUNCTIONAL ASSESSMENT
PAIN_FUNCTIONAL_ASSESSMENT: 0-10
PAIN_FUNCTIONAL_ASSESSMENT: UNABLE TO SELF-REPORT
PAIN_FUNCTIONAL_ASSESSMENT: 0-10

## 2025-03-28 ASSESSMENT — PAIN SCALES - GENERAL
PAINLEVEL_OUTOF10: 3
PAINLEVEL_OUTOF10: 5 - MODERATE PAIN
PAINLEVEL_OUTOF10: 3
PAINLEVEL_OUTOF10: 5 - MODERATE PAIN
PAINLEVEL_OUTOF10: 7
PAINLEVEL_OUTOF10: 5 - MODERATE PAIN
PAINLEVEL_OUTOF10: 5 - MODERATE PAIN
PAINLEVEL_OUTOF10: 3

## 2025-03-28 ASSESSMENT — COLUMBIA-SUICIDE SEVERITY RATING SCALE - C-SSRS
2. HAVE YOU ACTUALLY HAD ANY THOUGHTS OF KILLING YOURSELF?: NO
6. HAVE YOU EVER DONE ANYTHING, STARTED TO DO ANYTHING, OR PREPARED TO DO ANYTHING TO END YOUR LIFE?: NO
1. IN THE PAST MONTH, HAVE YOU WISHED YOU WERE DEAD OR WISHED YOU COULD GO TO SLEEP AND NOT WAKE UP?: NO

## 2025-03-28 ASSESSMENT — PAIN DESCRIPTION - DESCRIPTORS
DESCRIPTORS: OTHER (COMMENT)
DESCRIPTORS: BURNING
DESCRIPTORS: BURNING

## 2025-03-28 NOTE — POST-PROCEDURE NOTE
1330 Pt in phase II    1335 Nurse help pt to get dress    1340 IV removed    1345 Discharge instruction reviewed with pt and family by nurse

## 2025-03-28 NOTE — ANESTHESIA POSTPROCEDURE EVALUATION
Patient: Mu Escalante    Procedure Summary       Date: 03/28/25 Room / Location: [facility] A OR 09 / Virtual [facility] A OR    Anesthesia Start: 1112 Anesthesia Stop: 1224    Procedure: Male Genitalia Incision & Drainage (Scrotum) Diagnosis:       Scrotal abscess      (Scrotal abscess [N49.2])    Surgeons: Bipin Burrell MD MPH Responsible Provider: Dakota Mack MD    Anesthesia Type: general ASA Status: 3            Anesthesia Type: general    Vitals Value Taken Time   /74 03/28/25 1245   Temp 36.3 °C (97.3 °F) 03/28/25 1214   Pulse 70 03/28/25 1245   Resp 18 03/28/25 1245   SpO2 92 % 03/28/25 1245       Anesthesia Post Evaluation    Patient location during evaluation: bedside  Patient participation: complete - patient participated  Level of consciousness: awake  Pain management: adequate  Multimodal analgesia pain management approach  Airway patency: patent  Cardiovascular status: stable  Respiratory status: spontaneous ventilation and unassisted  Hydration status: acceptable  Postoperative Nausea and Vomiting: none  Comments: No significant PONV.        There were no known notable events for this encounter.

## 2025-03-28 NOTE — ANESTHESIA PROCEDURE NOTES
Airway  Date/Time: 3/28/2025 11:21 AM  Urgency: elective    Airway not difficult    Staffing  Performed: SHERINE   Authorized by: Dakota Mack MD    Performed by: SHERINE Gupta  Patient location during procedure: OR    Indications and Patient Condition  Indications for airway management: anesthesia  Spontaneous Ventilation: absent  Sedation level: deep  Preoxygenated: yes  Patient position: sniffing  MILS maintained throughout  Mask difficulty assessment: 1 - vent by mask  Planned trial extubation    Final Airway Details  Final airway type: endotracheal airway      Successful airway: ETT  Cuffed: yes   Successful intubation technique: direct laryngoscopy  Facilitating devices/methods: intubating stylet  Endotracheal tube insertion site: oral  Blade: Amadou  Blade size: #4  ETT size (mm): 7.5  Cormack-Lehane Classification: grade I - full view of glottis  Placement verified by: chest auscultation and capnometry   Cuff volume (mL): 5  Measured from: teeth  ETT to teeth (cm): 21  Number of attempts at approach: 1

## 2025-03-28 NOTE — OP NOTE
Male Genitalia Incision & Drainage Operative Note     Date: 3/28/2025  OR Location: [facility] A OR    Name: Mu Escalante, : 1970, Age: 54 y.o., MRN: [id number], Sex: male    Diagnosis  Pre-op Diagnosis      * Scrotal abscess [N49.2] Post-op Diagnosis     * Scrotal abscess [N49.2]     Procedures        Surgeons   Dr Jaquez    Resident/Fellow/Other Assistant:  Surgeons and Role:  * No surgeons found with a matching role *    Staff:   Circulator: Geena Hines Person: Ju    Anesthesia Staff: Anesthesiologist: Dakota Mack MD  CRNA: Conchita Moseley APRN-CRNA  C-AA: SHERINE Gupta  SRNA: Debbie Abdi    Procedure Summary  Anesthesia: General  ASA: III  Estimated Blood Loss: 5mL  Intra-op Medications:   Administrations occurring from 1100 to 1200 on 25:   Medication Name Total Dose   albuterol inhaler 5 puff   fentaNYL (Sublimaze) injection 50 mcg/mL 50 mcg   heparin injection 5,000 units/mL 5,000 Units   labetalol (Normodyne,Trandate) injection 5 mg   LR bolus Cannot be calculated   lidocaine PF (Xylocaine-MPF) local injection 2 % 100 mg   midazolam PF (Versed) injection 1 mg/mL 2 mg   ondansetron (Zofran) 2 mg/mL injection 4 mg   propofol (Diprivan) injection 10 mg/mL 200 mg   rocuronium (ZeMuron) 50 mg/5 mL injection 70 mg   vancomycin IVPB 750 mg in 150 mL D5W - premix 750 mg              Anesthesia Record               Intraprocedure I/O Totals          Intake    LR bolus 800.00 mL    vancomycin 750 mg/150 mL 150.00 mL    Total Intake 950 mL          Specimen:   ID Type Source Tests Collected by Time   1 : RIGHT SCROTAL ABSCESS Tissue ABSCESS SURGICAL PATHOLOGY EXAM Bipin Burrell MD MPH 3/28/2025 1158   A : RIGHT SCROTAL ABSCESS Swab ABSCESS FUNGAL CULTURE/SMEAR, TISSUE/WOUND CULTURE/SMEAR Bipin Burrell MD MPH 3/28/2025 1149                 Drains and/or Catheters: * None in log *    Tourniquet Times:         Implants:     Findings: right anterolateral perianal  abscess    Indications: Mu Escalante is an 54 y.o. male who is having surgery for Scrotal abscess [N49.2]. I was called intraoperatively to assess perianal induration.     The patient was seen in the preoperative area. The risks, benefits, complications, treatment options, non-operative alternatives, expected recovery and outcomes were discussed with the patient. The possibilities of reaction to medication, pulmonary aspiration, injury to surrounding structures, bleeding, recurrent infection, the need for additional procedures, failure to diagnose a condition, and creating a complication requiring transfusion or operation were discussed with the patient. The patient concurred with the proposed plan, giving informed consent.  The site of surgery was properly noted/marked if necessary per policy. The patient has been actively warmed in preoperative area. Preoperative antibiotics have been ordered and given within 1 hours of incision. Venous thrombosis prophylaxis have been ordered including bilateral sequential compression devices    Procedure Details: Patient was already in lithotomy position when I enter the OR.  Dr. Michel  had already performed the bulk of his surgery on the scrotal abscess.  On the patient's right anterolateral anal verge there is an area of induration with some purulent drainage.  I did not see any obvious evidence of a fistula.  I proceeded with incisional drainage of this abscess using a scalpel.  With a hemostat we broke up loculations.  This appeared to be an isolated abscess in the area of the perianal verge and did not seem to communicate with the  scrotal process.  Wound was irrigated and packed with iodoform.  Patient was given instructions and postop folder to follow-up with me in 10 days      Complications:  None; patient tolerated the procedure well.    Disposition: PACU - hemodynamically stable.  Condition: stable                 Additional Details:     Attending  Attestation: I performed the procedure.    Bipin Burrell  Phone Number: [phone]

## 2025-03-28 NOTE — INTERVAL H&P NOTE
H&P reviewed. The patient was examined and there are no changes to the H&P.    We had a extremely long and extensive discussion with the patient regarding the risks, benefits, side effects, adverse events, alternative of the procedure today.   We discussed that his condition might recur after surgery.  We discussed that he is at very high risk of developing aspiration pneumonia, hypoxia, lung and pulmonary complications intra and postop.   he is at high risk of developing DVT, pulmonary embolism, cardiac arrest, death, prolonged ICU stay given his comorbidities.  Patient verbalized understanding and would like to proceed with the surgery despite the very high risk associated with it.         We discussed the risk, benefit, step-by-step procedure, adverse events, side effects of scrotal abscess incision and drainage, we discussed that this could turn into Bud's gangrene necessitating major debridement as second stage. We discussed outcomes, quality of life, and survival benefit of each therapy. Patient verbalized understanding.     - Pt denies taking any blood thinners     Plan:  - Schedule for scrotal I/D

## 2025-03-28 NOTE — OP NOTE
Male Genitalia Incision & Drainage Operative Note     Date: 3/28/2025  OR Location: [facility] A OR    Name: Mu Escalante, : 1970, Age: 54 y.o., MRN: [id number], Sex: male    Diagnosis  Pre-op Diagnosis      * Scrotal abscess [N49.2] Post-op Diagnosis     * Scrotal abscess [N49.2]     Procedures  Male Genitalia Incision & Drainage  10428 - VA I&D EPIDIDYMIS TSTIS&/SCROTAL SPACE      Surgeons      * Bipin Burrell - Primary    Resident/Fellow/Other Assistant:  Surgeons and Role:  * No surgeons found with a matching role *    Staff:   Circulator:   Scrub Person:   Surgical Assistant:     Anesthesia Staff: Anesthesiologist: Dakota Mack MD  C-AA: SHERINE Gupta    Procedure Summary  Anesthesia: Anesthesia type not filed in the log.  ASA: ASA status not filed in the log.  Estimated Blood Loss: 10mL  Intra-op Medications: Administrations occurring from 1100 to 1200 on 25:  * No intraprocedure medications in log *        Specimen: No specimens collected           Indications: Mu Escalante is an 54 y.o. male who is having surgery for Scrotal abscess [N49.2].     The patient was seen in the preoperative area. The risks, benefits, complications, treatment options, non-operative alternatives, expected recovery and outcomes were discussed with the patient. The possibilities of reaction to medication, pulmonary aspiration, injury to surrounding structures, bleeding, recurrent infection, the need for additional procedures, failure to diagnose a condition, and creating a complication requiring transfusion or operation were discussed with the patient. The patient concurred with the proposed plan, giving informed consent.  The site of surgery was properly noted/marked if necessary per policy. The patient has been actively warmed in preoperative area. Preoperative antibiotics have been ordered and given within 1 hours of incision. Venous thrombosis prophylaxis have been ordered including  bilateral sequential compression devices    Procedure Details:   Extensive areas involved by right lateral scrotal abscess. At least  5*7 right scrotum. Patient abscess excision was done with bovie drained purulent pus. We had to use multiple inturrupted 2-0 silk to approximate to close the defect after resection.  Bacitracin applied after procedure.    Pt was noticed to have track in his righ buttock area medially, Dr. Jaquez was consulted introP and drained this abscess that did not seem to communicate with his scrotal abscess.    Both were packed with iodoform gauze. Supportive briefs applied for scrotal support. Patient brought back to PACU in stable condition.     Pt at extremely high risk of wound dehiscence, wound infection, penile skin scarring, erectile dysfunction, peynories, skin deformity, need for wound revision.           Complications:  None; patient tolerated the procedure well.    Disposition: PACU - hemodynamically stable.  Condition: stable       Attending Attestation:     Bipin Burrell  Phone Number: [phone]

## 2025-03-30 LAB
BACTERIA SPEC CULT: NORMAL
FUNGUS SPEC FUNGUS STN: NORMAL
GRAM STN SPEC: NORMAL
GRAM STN SPEC: NORMAL
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
## 2025-03-31 ENCOUNTER — OFFICE VISIT (OUTPATIENT)
Dept: UROLOGY | Facility: HOSPITAL | Age: 55
End: 2025-03-31
Payer: COMMERCIAL

## 2025-03-31 DIAGNOSIS — N49.2 SCROTAL ABSCESS: Primary | ICD-10-CM

## 2025-03-31 DIAGNOSIS — Z48.89 POSTOPERATIVE VISIT: ICD-10-CM

## 2025-03-31 PROCEDURE — G2211 COMPLEX E/M VISIT ADD ON: HCPCS | Performed by: NURSE PRACTITIONER

## 2025-03-31 PROCEDURE — 4004F PT TOBACCO SCREEN RCVD TLK: CPT | Performed by: NURSE PRACTITIONER

## 2025-03-31 PROCEDURE — 99213 OFFICE O/P EST LOW 20 MIN: CPT | Performed by: NURSE PRACTITIONER
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
## 2025-04-02 ENCOUNTER — APPOINTMENT (OUTPATIENT)
Dept: UROLOGY | Facility: HOSPITAL | Age: 55
End: 2025-04-02
Payer: COMMERCIAL

## 2025-04-02 ENCOUNTER — OFFICE VISIT (OUTPATIENT)
Dept: UROLOGY | Facility: HOSPITAL | Age: 55
End: 2025-04-02
Payer: COMMERCIAL

## 2025-04-02 DIAGNOSIS — N49.2 SCROTAL ABSCESS: Primary | ICD-10-CM

## 2025-04-02 LAB
FUNGUS SPEC CULT: NORMAL
FUNGUS SPEC FUNGUS STN: NORMAL

## 2025-04-02 PROCEDURE — 99211 OFF/OP EST MAY X REQ PHY/QHP: CPT | Performed by: STUDENT IN AN ORGANIZED HEALTH CARE EDUCATION/TRAINING PROGRAM
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
## 2025-04-07 LAB
FUNGUS SPEC CULT: NORMAL
FUNGUS SPEC FUNGUS STN: NORMAL

## 2025-04-08 ENCOUNTER — OFFICE VISIT (OUTPATIENT)
Dept: SURGERY | Facility: HOSPITAL | Age: 55
End: 2025-04-08
Payer: COMMERCIAL

## 2025-04-08 VITALS — HEART RATE: 78 BPM | DIASTOLIC BLOOD PRESSURE: 104 MMHG | TEMPERATURE: 96.6 F | SYSTOLIC BLOOD PRESSURE: 194 MMHG

## 2025-04-08 DIAGNOSIS — K61.0 PERIANAL ABSCESS: Primary | ICD-10-CM

## 2025-04-08 LAB
LABORATORY COMMENT REPORT: NORMAL
PATH REPORT.FINAL DX SPEC: NORMAL
PATH REPORT.GROSS SPEC: NORMAL
PATH REPORT.RELEVANT HX SPEC: NORMAL
PATH REPORT.TOTAL CANCER: NORMAL

## 2025-04-08 PROCEDURE — 99212 OFFICE O/P EST SF 10 MIN: CPT | Performed by: SURGERY

## 2025-04-08 PROCEDURE — 3080F DIAST BP >= 90 MM HG: CPT | Performed by: SURGERY

## 2025-04-08 PROCEDURE — 3077F SYST BP >= 140 MM HG: CPT | Performed by: SURGERY

## 2025-04-08 PROCEDURE — 4004F PT TOBACCO SCREEN RCVD TLK: CPT | Performed by: SURGERY

## 2025-04-08 ASSESSMENT — PAIN SCALES - GENERAL: PAINLEVEL_OUTOF10: 8

## 2025-04-08 NOTE — PROGRESS NOTES
Mu Escalante is a 54 y.o. male on day 0 of admission presenting with No Principal Problem: There is no principal problem currently on the Problem List. Please update the Problem List and refresh..    Assessment/Plan     Wound is healing as expected.  Wound care instructions reviewed.  He will follow-up with me as needed    Subjective   Mr. Escalante is following up after recent surgery with urology for scrotal abscess.  I was called in intraoperatively to drain a perianal abscess.  Overall he is making the expected kind of recovery.  Still having some discomfort and drainage.  He is showering twice a day.       Objective     Physical Exam  NAD  A&Ox3  Non icteric  Perianal wound is pink and granulating in well nicely. Scrotal wound open, clean appearing    Last Recorded Vitals  Blood pressure (!) 194/104, pulse 78, temperature 35.9 °C (96.6 °F), temperature source Temporal.  Intake/Output last 3 Shifts:  No intake/output data recorded.    Relevant Results    Scheduled medications    Continuous medications    PRN medications      No results found for this or any previous visit (from the past 24 hours).        I spent 25 minutes in the professional and overall care of this patient.      Jani Jaquez MD

## 2025-04-08 NOTE — LETTER
April 8, 2025     Blaine Tobin PA-C  [address]  [place] [address]    Patient: Mu Escalante   YOB: 1970   Date of Visit: 4/8/2025       Dear Dr. Blaine Tobin PA-C:    Thank you for referring Mu Escalante to me for evaluation. Below are my notes for this consultation.  If you have questions, please do not hesitate to call me. I look forward to following your patient along with you.       Sincerely,     Jani Jaquez MD      CC: Bipin Burrell MD [facility]  ______________________________________________________________________________________    Mu Escalante is a 54 y.o. male on day 0 of admission presenting with No Principal Problem: There is no principal problem currently on the Problem List. Please update the Problem List and refresh..    Assessment/Plan    Wound is healing as expected.  Wound care instructions reviewed.  He will follow-up with me as needed    Subjective  Mr. Escalante is following up after recent surgery with urology for scrotal abscess.  I was called in intraoperatively to drain a perianal abscess.  Overall he is making the expected kind of recovery.  Still having some discomfort and drainage.  He is showering twice a day.       Objective    Physical Exam  NAD  A&Ox3  Non icteric  Perianal wound is pink and granulating in well nicely. Scrotal wound open, clean appearing    Last Recorded Vitals  Blood pressure (!) 194/104, pulse 78, temperature 35.9 °C (96.6 °F), temperature source Temporal.  Intake/Output last 3 Shifts:  No intake/output data recorded.    Relevant Results    Scheduled medications    Continuous medications    PRN medications      No results found for this or any previous visit (from the past 24 hours).        I spent 25 minutes in the professional and overall care of this patient.      Jani Jaquez MD

## 2025-04-09 ENCOUNTER — OFFICE VISIT (OUTPATIENT)
Dept: UROLOGY | Facility: HOSPITAL | Age: 55
End: 2025-04-09
Payer: COMMERCIAL

## 2025-04-09 DIAGNOSIS — N49.2 SCROTUM, ABSCESS: Primary | ICD-10-CM

## 2025-04-09 PROCEDURE — 99213 OFFICE O/P EST LOW 20 MIN: CPT | Performed by: STUDENT IN AN ORGANIZED HEALTH CARE EDUCATION/TRAINING PROGRAM
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
## 2025-04-14 LAB
FUNGUS SPEC CULT: NORMAL
FUNGUS SPEC FUNGUS STN: NORMAL

## 2025-04-21 PROBLEM — I62.9 INTRACRANIAL BLEED (MULTI): Status: ACTIVE | Noted: 2025-01-01
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
## 2025-04-21 NOTE — LETTER
April 25, 2025     Patient: Mu Escalante   YOB: 1970   Date of Visit: 4/21/2025       To Whom It May Concern:    Mu Escalante has been hospitalized in our ICU for a terminal illness. His significant other, Ms. Malinda Weiss,  has been at his bedside to provide support. Please excuse Malinda Weiss for her absence from work for the following dates: 4/21/2025 to 4/25/2025.     If you have any questions or concerns, please don't hesitate to call.         Sincerely,         René Romero MD        CC: No Recipients
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
## 2025-04-22 NOTE — CARE PLAN
Problem: Fall/Injury  Goal: Not fall by end of shift  Outcome: Progressing  Goal: Be free from injury by end of the shift  Outcome: Progressing  Goal: Verbalize understanding of personal risk factors for fall in the hospital  Outcome: Progressing  Goal: Verbalize understanding of risk factor reduction measures to prevent injury from fall in the home  Outcome: Progressing  Goal: Use assistive devices by end of the shift  Outcome: Progressing  Goal: Pace activities to prevent fatigue by end of the shift  Outcome: Progressing     Problem: Skin  Goal: Decreased wound size/increased tissue granulation at next dressing change  Outcome: Progressing  Flowsheets (Taken 4/22/2025 1949)  Decreased wound size/increased tissue granulation at next dressing change:   Promote sleep for wound healing   Utilize specialty bed per algorithm   Protective dressings over bony prominences  Goal: Participates in plan/prevention/treatment measures  Outcome: Progressing  Flowsheets (Taken 4/22/2025 1949)  Participates in plan/prevention/treatment measures: Elevate heels  Goal: Prevent/manage excess moisture  Outcome: Progressing  Flowsheets (Taken 4/22/2025 1949)  Prevent/manage excess moisture:   Cleanse incontinence/protect with barrier cream   Use wicking fabric (obtain order)   Follow provider orders for dressing changes   Moisturize dry skin   Monitor for/manage infection if present  Goal: Prevent/minimize sheer/friction injuries  Outcome: Progressing  Flowsheets (Taken 4/22/2025 1949)  Prevent/minimize sheer/friction injuries:   HOB 30 degrees or less   Use pull sheet   Utilize specialty bed per algorithm   Complete micro-shifts as needed if patient unable. Adjust patient position to relieve pressure points, not a full turn   Turn/reposition every 2 hours/use positioning/transfer devices  Goal: Promote/optimize nutrition  Outcome: Progressing  Flowsheets (Taken 4/22/2025 1949)  Promote/optimize nutrition: Monitor/record intake  including meals  Goal: Promote skin healing  Outcome: Progressing  Flowsheets (Taken 4/22/2025 1949)  Promote skin healing:   Assess skin/pad under line(s)/device(s)   Rotate device position/do not position patient on device   Turn/reposition every 2 hours/use positioning/transfer devices   Ensure correct size (line/device) and apply per  instructions   Protective dressings over bony prominences     Problem: Pain - Adult  Goal: Verbalizes/displays adequate comfort level or baseline comfort level  Outcome: Progressing  Flowsheets (Taken 4/22/2025 1949)  Verbalizes/displays adequate comfort level or baseline comfort level:   Consider cultural and social influences on pain and pain management   Administer analgesics based on type and severity of pain and evaluate response   Encourage patient to monitor pain and request assistance   Assess pain using appropriate pain scale   Implement non-pharmacological measures as appropriate and evaluate response   Notify Licensed Independent Practitioner if interventions unsuccessful or patient reports new pain     Problem: Safety - Adult  Goal: Free from fall injury  Outcome: Progressing  Flowsheets (Taken 4/22/2025 1949)  Free from fall injury:   Instruct family/caregiver on patient safety   Based on caregiver fall risk screen, instruct family/caregiver to ask for assistance with transferring infant if caregiver noted to have fall risk factors     Problem: Discharge Planning  Goal: Discharge to home or other facility with appropriate resources  Outcome: Progressing  Flowsheets (Taken 4/22/2025 1949)  Discharge to home or other facility with appropriate resources:   Identify barriers to discharge with patient and caregiver   Identify discharge learning needs (meds, wound care, etc)   Refer to discharge planning if patient needs post-hospital services based on physician order or complex needs related to functional status, cognitive ability or social support system    Arrange for needed discharge resources and transportation as appropriate   Arrange for interpreters to assist at discharge as needed     Problem: Chronic Conditions and Co-morbidities  Goal: Patient's chronic conditions and co-morbidity symptoms are monitored and maintained or improved  Outcome: Progressing  Flowsheets (Taken 4/22/2025 1949)  Care Plan - Patient's Chronic Conditions and Co-Morbidity Symptoms are Monitored and Maintained or Improved:   Monitor and assess patient's chronic conditions and comorbid symptoms for stability, deterioration, or improvement   Update acute care plan with appropriate goals if chronic or comorbid symptoms are exacerbated and prevent overall improvement and discharge   Collaborate with multidisciplinary team to address chronic and comorbid conditions and prevent exacerbation or deterioration     Problem: Nutrition  Goal: Nutrient intake appropriate for maintaining nutritional needs  Outcome: Progressing     Problem: Knowledge Deficit  Goal: Patient/family/caregiver demonstrates understanding of disease process, treatment plan, medications, and discharge instructions  Outcome: Progressing  Flowsheets (Taken 4/22/2025 1949)  Patient/family/caregiver demonstrates understanding of disease process, treatment plan, medications, and discharge instructions:   Complete learning assessment and assess knowledge base   Provide teaching at level of understanding   Provide teaching via preferred learning methods     Problem: Mechanical Ventilation  Goal: Patient Will Maintain Patent Airway  Outcome: Progressing  Flowsheets (Taken 4/22/2025 1949)  Patient Will Maintain Patent Airway:   Assess and monitor airway secretions and suction as needed per patient's condition and according to policy   Hyper oxygenate with 100% FiO2 prior to suctioning   Suctioning secretions as indicated to maintain patent airway   Elevate head of bed 30 degrees if patient has tube feeding  Goal: Oral health is  maintained or improved  Outcome: Progressing  Flowsheets (Taken 4/22/2025 1949)  Oral Health is Maintained or Improved:   Assess and monitor condition of lips and mouth and perform oral care per hospital policy   Apply water-based moisturizer to lips   Perform oral care with an oral swab   Suction oral pharynx  Goal: Tracheostomy will be managed safely  Outcome: Progressing  Flowsheets (Taken 4/22/2025 1949)  Tracheostomy Will Be Managed Safely:   Utilize tracheostomy securing device and change as necessary to ensure tracheostomy is secured to patient   Support ventilator tubing to avoid pressure from drag of tubing   Keep ambu bag, mask, oxygen connection tubing, and extra tracheostomy at bedside and accompanying patient at all times   Utilize trach securing device   Cleanse site with hydrogen peroxide   Protect skin with moisture barrier cream  Goal: ET tube will be managed safely  Outcome: Progressing  Flowsheets (Taken 4/22/2025 1949)  Endotracheal Tube Will Be Managed Safely:   Support ventilator tubing to avoid pressure from drag of tubing   Assess and monitor insertion depth at lip/nare line   Utilize endotracheal tube securing device   Utilize ETT securing device and change as necessary to ensure ETT is properly secured to patient   Keep ambu bag, mask, oxygen connection tubing, and extra ETT at bedside and accompanying patient at all times   Reposition endotracheal tube to opposite side of mouth  Goal: Ability to express needs and understand communication  Outcome: Progressing  Flowsheets (Taken 4/22/2025 1949)  Ability to express needs and understand communication:   Assess and monitor patient's ability to understand information and communicate   Encourage patient to communicate   Keep alphabet and symbol forms within reach   Provide patient with clipboard and pen/pencil   Implement interventions to promote patient's ability to communicate  Goal: Mobility/activity is maintained at optimum level for  patient  Outcome: Progressing   The patient's goals for the shift include      The clinical goals for the shift include hemodynamically stable    Over the shift, the patient did not make progress toward the following goals. Barriers to progression include . Recommendations to address these barriers include .

## 2025-04-22 NOTE — PROCEDURES
Arterial Line    Consent: yes    Indications: hemodynamic monitoring for organ donation    Performed by: Magy NJ    Assistants: Alhaji Henry PA-C    Details:     Sterility: Chlorhexidine skin prep. Hat and mask on myself and assistant(s). Antiseptic hand foam. Sterile gown, gloves and drape.  Local anesthesia: 0 mL of 1% lidocaine subcutaneously.  Ultrasound-guided insertion: yes    A time out was performed and the patient was identified.  Correct procedure and site verified.     The patient was prepped and draped in the normal sterile fashion.  The ultrasound probe was draped with a sterile probe cover. Using ultrasound guidance, the right radial artery was identified and cannulated.  There was good pulsatile flow through the needle.  A wire was then introduced into the artery.  The needle was removed and using the seldinger technique, the catheter was advanced over the wire.  The catheter was then secured with 2 sutures and connected to the monitor with a good arterial waveform noted.  A dressing was applied.      The patient tolerated the procedure well.

## 2025-04-22 NOTE — NURSING NOTE
4/21/25 Called [facility] at 2107, spoke to Magy. Received call back from [facility] spoke to Ngoc at 2120 who stated Erika, family liaison, would be on her way to facility. Patient arrived to ICU 2152. [facility] arrived to unit shortly after patient arrived.
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
## 2025-04-22 NOTE — CARE PLAN
Problem: Fall/Injury  Goal: Not fall by end of shift  Outcome: Progressing  Goal: Be free from injury by end of the shift  Outcome: Progressing  Goal: Verbalize understanding of personal risk factors for fall in the hospital  Outcome: Progressing  Goal: Verbalize understanding of risk factor reduction measures to prevent injury from fall in the home  Outcome: Progressing  Goal: Use assistive devices by end of the shift  Outcome: Progressing  Goal: Pace activities to prevent fatigue by end of the shift  Outcome: Progressing     Problem: Skin  Goal: Decreased wound size/increased tissue granulation at next dressing change  Outcome: Progressing  Flowsheets (Taken 4/21/2025 2348)  Decreased wound size/increased tissue granulation at next dressing change:   Promote sleep for wound healing   Protective dressings over bony prominences   Utilize specialty bed per algorithm  Goal: Participates in plan/prevention/treatment measures  Outcome: Progressing  Flowsheets (Taken 4/21/2025 2348)  Participates in plan/prevention/treatment measures:   Discuss with provider PT/OT consult   Elevate heels  Goal: Prevent/manage excess moisture  Outcome: Progressing  Flowsheets (Taken 4/21/2025 2348)  Prevent/manage excess moisture:   Cleanse incontinence/protect with barrier cream   Use wicking fabric (obtain order)   Follow provider orders for dressing changes   Moisturize dry skin   Monitor for/manage infection if present  Goal: Prevent/minimize sheer/friction injuries  Outcome: Progressing  Flowsheets (Taken 4/21/2025 2348)  Prevent/minimize sheer/friction injuries:   Turn/reposition every 2 hours/use positioning/transfer devices   Complete micro-shifts as needed if patient unable. Adjust patient position to relieve pressure points, not a full turn   HOB 30 degrees or less   Use pull sheet   Increase activity/out of bed for meals   Utilize specialty bed per algorithm  Goal: Promote/optimize nutrition  Outcome: Progressing  Flowsheets  (Taken 4/21/2025 2348)  Promote/optimize nutrition: Monitor/record intake including meals  Goal: Promote skin healing  Outcome: Progressing  Flowsheets (Taken 4/21/2025 2348)  Promote skin healing:   Assess skin/pad under line(s)/device(s)   Rotate device position/do not position patient on device   Turn/reposition every 2 hours/use positioning/transfer devices   Ensure correct size (line/device) and apply per  instructions   Protective dressings over bony prominences     Problem: Pain - Adult  Goal: Verbalizes/displays adequate comfort level or baseline comfort level  Outcome: Progressing  Flowsheets (Taken 4/21/2025 2348)  Verbalizes/displays adequate comfort level or baseline comfort level:   Consider cultural and social influences on pain and pain management   Administer analgesics based on type and severity of pain and evaluate response   Encourage patient to monitor pain and request assistance   Assess pain using appropriate pain scale   Implement non-pharmacological measures as appropriate and evaluate response   Notify Licensed Independent Practitioner if interventions unsuccessful or patient reports new pain     Problem: Safety - Adult  Goal: Free from fall injury  Outcome: Progressing  Flowsheets (Taken 4/21/2025 2348)  Free from fall injury:   Based on caregiver fall risk screen, instruct family/caregiver to ask for assistance with transferring infant if caregiver noted to have fall risk factors   Instruct family/caregiver on patient safety     Problem: Discharge Planning  Goal: Discharge to home or other facility with appropriate resources  Outcome: Progressing  Flowsheets (Taken 4/21/2025 2348)  Discharge to home or other facility with appropriate resources:   Identify barriers to discharge with patient and caregiver   Identify discharge learning needs (meds, wound care, etc)   Refer to discharge planning if patient needs post-hospital services based on physician order or complex needs  related to functional status, cognitive ability or social support system   Arrange for needed discharge resources and transportation as appropriate   Arrange for interpreters to assist at discharge as needed     Problem: Chronic Conditions and Co-morbidities  Goal: Patient's chronic conditions and co-morbidity symptoms are monitored and maintained or improved  Outcome: Progressing  Flowsheets (Taken 4/21/2025 1110)  Care Plan - Patient's Chronic Conditions and Co-Morbidity Symptoms are Monitored and Maintained or Improved:   Monitor and assess patient's chronic conditions and comorbid symptoms for stability, deterioration, or improvement   Collaborate with multidisciplinary team to address chronic and comorbid conditions and prevent exacerbation or deterioration   Update acute care plan with appropriate goals if chronic or comorbid symptoms are exacerbated and prevent overall improvement and discharge     Problem: Nutrition  Goal: Nutrient intake appropriate for maintaining nutritional needs  Outcome: Progressing   The patient's goals for the shift include      The clinical goals for the shift include comfort    Over the shift, the patient did not make progress toward the following goals. Barriers to progression include . Recommendations to address these barriers include .

## 2025-04-22 NOTE — PROGRESS NOTES
Mu Escalante is a 54 y.o. male on day 1 of admission presenting with Intracranial bleed (Multi).  Patient with h/o CHF, HTN, DLP, CKD, BPH, asthma, IBS with diarrhea, polyneuropathy, impaired fasting glucose, who presented to the ED after beng found unresponsive. He was intubated upon arrival to the ED. Work up revealed leukocytosis, hyperglycemia, bilateral airspace opacities on CXR, and massive ICH with significant mass effect on head CT. Neurosurgery was called who did not think the patient injury was survivable. Patient now is admitted to ICU for further care until he is transitioned to comfort care measures.   Subjective   Initially hypertensive, but now drop of BP requiring IVF and Levophed. [facility] is called, awaiting organ harvseting.      The patient is unresponsive and cannot provide history   Objective   Scheduled medications  insulin lispro, 0-5 Units, subcutaneous, q4h  oxygen, , inhalation, Continuous - Inhalation  pantoprazole, 40 mg, intravenous, Daily before breakfast  rosuvastatin, 20 mg, oral, Nightly  sodium chloride, 1,000 mL, intravenous, Once    Continuous medications  fentaNYL, 0-300 mcg/hr, Last Rate: Stopped (04/22/25 0615)  norepinephrine, 0-0.5 mcg/kg/min, Last Rate: 0.19 mcg/kg/min (04/22/25 0713)  propofol, 0-50 mcg/kg/min, Last Rate: Stopped (04/22/25 0615)    PRN medications  PRN medications: albuterol, dextrose, dextrose, fentaNYL, glucagon, glucagon, hydrALAZINE, labetaloL, ondansetron, polyethylene glycol   Physical Exam  Constitutional:       General: He is not in acute distress.     Appearance: He is obese.   HENT:      Head: Normocephalic and atraumatic.      Mouth/Throat:      Comments: ET and G tube in place.  Eyes:      General: No scleral icterus.     Comments: [place]-orbital ecchymosis L eye. Pupils dilated and fixed.    Neck:      Vascular: No carotid bruit.   Cardiovascular:      Rate and Rhythm: Normal rate and regular rhythm.      Heart sounds: No murmur heard.      "No friction rub. No gallop.   Pulmonary:      Effort: Pulmonary effort is normal. No respiratory distress.      Breath sounds: Rales (diffuse bilatera.) present. No wheezing.   Abdominal:      General: Bowel sounds are normal. There is no distension.      Palpations: Abdomen is soft.      Tenderness: There is no abdominal tenderness.   Musculoskeletal:         General: No tenderness.      Cervical back: Neck supple. No rigidity or tenderness.      Right lower leg: Edema (2+) present.      Left lower leg: Edema (2+) present.   Skin:     General: Skin is warm and dry.      Coloration: Skin is not jaundiced.      Findings: Bruising (L facial) present.   Neurological:      Mental Status: He is alert.      Comments: Unresponsive, cannot assess   Psychiatric:      Comments: Cannot assess, unresponsive     Last Recorded Vitals  Blood pressure (!) 150/98, pulse 97, temperature 37.4 °C (99.3 °F), temperature source Oral, resp. rate 16, height 1.93 m (6' 4\"), weight (!) 172 kg (379 lb 10.1 oz), SpO2 100%.  Intake/Output last 3 Shifts:  I/O last 3 completed shifts:  In: 1277.1 (7.4 mL/kg) [I.V.:177.1 (1 mL/kg); IV Piggyback:1100]  Out: 2730 (15.9 mL/kg) [Urine:2130 (0.3 mL/kg/hr); Emesis/NG output:600]  Weight: 172.2 kg   Relevant Results  Results for orders placed or performed during the hospital encounter of 04/21/25 (from the past 24 hours)   ECG 12 lead   Result Value Ref Range    Ventricular Rate 107 BPM    Atrial Rate 107 BPM    OR Interval 152 ms    QRS Duration 110 ms    QT Interval 378 ms    QTC Calculation(Bazett) 504 ms    P Axis 52 degrees    R Axis -88 degrees    T Axis 61 degrees    QRS Count 17 beats    Q Onset 205 ms    P Onset 129 ms    P Offset 193 ms    T Offset 394 ms    QTC Fredericia 458 ms   CBC and Auto Differential   Result Value Ref Range    WBC 18.3 (H) 4.4 - 11.3 x10*3/uL    nRBC 0.0 0.0 - 0.0 /100 WBCs    RBC 5.12 4.50 - 5.90 x10*6/uL    Hemoglobin 16.6 13.5 - 17.5 g/dL    Hematocrit 49.7 41.0 - " 52.0 %    MCV 97 80 - 100 fL    MCH 32.4 26.0 - 34.0 pg    MCHC 33.4 32.0 - 36.0 g/dL    RDW 13.2 11.5 - 14.5 %    Platelets 209 150 - 450 x10*3/uL    Neutrophils % 79.1 40.0 - 80.0 %    Immature Granulocytes %, Automated 0.5 0.0 - 0.9 %    Lymphocytes % 12.4 13.0 - 44.0 %    Monocytes % 6.9 2.0 - 10.0 %    Eosinophils % 0.5 0.0 - 6.0 %    Basophils % 0.6 0.0 - 2.0 %    Neutrophils Absolute 14.50 (H) 1.20 - 7.70 x10*3/uL    Immature Granulocytes Absolute, Automated 0.10 0.00 - 0.70 x10*3/uL    Lymphocytes Absolute 2.28 1.20 - 4.80 x10*3/uL    Monocytes Absolute 1.26 (H) 0.10 - 1.00 x10*3/uL    Eosinophils Absolute 0.09 0.00 - 0.70 x10*3/uL    Basophils Absolute 0.11 (H) 0.00 - 0.10 x10*3/uL   Comprehensive Metabolic Panel   Result Value Ref Range    Glucose 264 (H) 74 - 99 mg/dL    Sodium 134 (L) 136 - 145 mmol/L    Potassium 3.4 (L) 3.5 - 5.3 mmol/L    Chloride 98 98 - 107 mmol/L    Bicarbonate 24 21 - 32 mmol/L    Anion Gap 15 10 - 20 mmol/L    Urea Nitrogen 18 6 - 23 mg/dL    Creatinine 1.16 0.50 - 1.30 mg/dL    eGFR 74 >60 mL/min/1.73m*2    Calcium 8.9 8.6 - 10.3 mg/dL    Albumin 4.0 3.4 - 5.0 g/dL    Alkaline Phosphatase 78 33 - 120 U/L    Total Protein 7.0 6.4 - 8.2 g/dL    AST 29 9 - 39 U/L    Bilirubin, Total 0.5 0.0 - 1.2 mg/dL    ALT 38 10 - 52 U/L   Magnesium   Result Value Ref Range    Magnesium 1.86 1.60 - 2.40 mg/dL   Lipase   Result Value Ref Range    Lipase 47 9 - 82 U/L   Acute Toxicology Panel, Blood   Result Value Ref Range    Acetaminophen <10.0 10.0 - 30.0 ug/mL    Salicylate  <3 4 - 20 mg/dL    Alcohol <10 <=10 mg/dL   Troponin I, High Sensitivity, Initial   Result Value Ref Range    Troponin I, High Sensitivity 18 0 - 20 ng/L   Sars-CoV-2, Influenza A/B and RSV PCR   Result Value Ref Range    Coronavirus 2019, PCR Not Detected Not Detected    Flu A Result Not Detected Not Detected    Flu B Result Not Detected Not Detected    RSV PCR Not Detected Not Detected   BLOOD GAS ARTERIAL FULL PANEL    Result Value Ref Range    POCT pH, Arterial 7.26 (L) 7.38 - 7.42 pH    POCT pCO2, Arterial 58 (H) 38 - 42 mm Hg    POCT pO2, Arterial 80 (L) 85 - 95 mm Hg    POCT SO2, Arterial 95 94 - 100 %    POCT Oxy Hemoglobin, Arterial 91.4 (L) 94.0 - 98.0 %    POCT Hematocrit Calculated, Arterial 51.0 41.0 - 52.0 %    POCT Sodium, Arterial 133 (L) 136 - 145 mmol/L    POCT Potassium, Arterial 3.8 3.5 - 5.3 mmol/L    POCT Chloride, Arterial 101 98 - 107 mmol/L    POCT Ionized Calcium, Arterial 1.17 1.10 - 1.33 mmol/L    POCT Glucose, Arterial 214 (H) 74 - 99 mg/dL    POCT Lactate, Arterial 2.0 0.4 - 2.0 mmol/L    POCT Base Excess, Arterial -2.5 (L) -2.0 - 3.0 mmol/L    POCT HCO3 Calculated, Arterial 26.0 22.0 - 26.0 mmol/L    POCT Hemoglobin, Arterial 16.9 13.5 - 17.5 g/dL    POCT Anion Gap, Arterial 10 10 - 25 mmo/L    Patient Temperature 37.0 degrees Celsius    FiO2 100 %    Ventilator Mode A/C     Ventilator Rate 16 bpm    Tidal Volume 550 mL    Peep CHM2O 8.0 cm H2O    Site of Arterial Puncture Radial Left     Joey's Test Positive    POCT GLUCOSE   Result Value Ref Range    POCT Glucose 139 (H) 74 - 99 mg/dL   Drug Screen, Urine   Result Value Ref Range    Amphetamine Screen, Urine Presumptive Negative Presumptive Negative    Barbiturate Screen, Urine Presumptive Negative Presumptive Negative    Benzodiazepines Screen, Urine Presumptive Negative Presumptive Negative    Cannabinoid Screen, Urine Presumptive Negative Presumptive Negative    Cocaine Metabolite Screen, Urine Presumptive Negative Presumptive Negative    Fentanyl Screen, Urine Presumptive Negative Presumptive Negative    Opiate Screen, Urine Presumptive Negative Presumptive Negative    Oxycodone Screen, Urine Presumptive Negative Presumptive Negative    PCP Screen, Urine Presumptive Negative Presumptive Negative    Methadone Screen, Urine Presumptive Negative Presumptive Negative   Urinalysis with Reflex Culture and Microscopic   Result Value Ref Range     Color, Urine Yellow Light-Yellow, Yellow, Dark-Yellow    Appearance, Urine Ex.Turbid (N) Clear    Specific Gravity, Urine >1.050 (N) 1.005 - 1.035    pH, Urine 6.0 5.0, 5.5, 6.0, 6.5, 7.0, 7.5, 8.0    Protein, Urine 50 (1+) (A) NEGATIVE, 10 (TRACE), 20 (TRACE) mg/dL    Glucose, Urine Normal Normal mg/dL    Blood, Urine NEGATIVE NEGATIVE mg/dL    Ketones, Urine NEGATIVE NEGATIVE mg/dL    Bilirubin, Urine NEGATIVE NEGATIVE mg/dL    Urobilinogen, Urine Normal Normal mg/dL    Nitrite, Urine NEGATIVE NEGATIVE    Leukocyte Esterase, Urine NEGATIVE NEGATIVE   Urinalysis Microscopic   Result Value Ref Range    WBC, Urine NONE 1-5, NONE /HPF    RBC, Urine NONE NONE, 1-2, 3-5 /HPF    Mucus, Urine FEW Reference range not established. /LPF    Amorphous Crystals, Urine 1+ NONE, 1+, 2+ /HPF   Troponin, High Sensitivity, 1 Hour   Result Value Ref Range    Troponin I, High Sensitivity 689 (HH) 0 - 20 ng/L   Amylase   Result Value Ref Range    Amylase 24 (L) 29 - 103 U/L   Lipase   Result Value Ref Range    Lipase 26 9 - 82 U/L   Type and screen   Result Value Ref Range    ABO TYPE O     Rh TYPE POS     ANTIBODY SCREEN NEG    VERIFY ABO/Rh Group Test   Result Value Ref Range    ABO TYPE O     Rh TYPE POS    POCT GLUCOSE   Result Value Ref Range    POCT Glucose 142 (H) 74 - 99 mg/dL   Amylase   Result Value Ref Range    Amylase 24 (L) 29 - 103 U/L   Lipase   Result Value Ref Range    Lipase 21 9 - 82 U/L   Hemoglobin and hematocrit, blood   Result Value Ref Range    Hemoglobin 14.9 13.5 - 17.5 g/dL    Hematocrit 44.8 41.0 - 52.0 %   ECG 12 lead  Result Date: 4/22/2025  Sinus tachycardia Pulmonary disease pattern Left anterior fascicular block Nonspecific ST abnormality Prolonged QT Abnormal ECG When compared with ECG of 21-MAR-2025 13:44, Significant changes have occurred    XR chest 1 view  Result Date: 4/22/2025  Interpreted By:  Randy Borrego, STUDY: XR CHEST 1 VIEW;  4/21/2025 10:55 pm   INDICATION: Signs/Symptoms:OG placement.    COMPARISON: Chest x-ray 04/21/2025   ACCESSION NUMBER(S): [id number]   ORDERING CLINICIAN: RUSSELL SARABIA   FINDINGS: Endotracheal tube terminates approximately 6.0 cm above the radha. Enteric tube extends to level of the distal esophagus with tip not well visualized. Overlying leads are present.   CARDIOMEDIASTINAL SILHOUETTE: Cardiomediastinal silhouette is stable in size and configuration.   LUNGS: There are bilateral airspace opacities predominant in the a upper lobes concerning for developing airspace disease. No effusion or pneumothorax.   ABDOMEN: No remarkable upper abdominal findings.   BONES: No acute osseous abnormality.       Support hardware as described above. Enteric tube extends to the distal esophagus with tip not well visualized. Consider repositioning and repeat imaging.   There bilateral airspace opacities concerning for multifocal pneumonia as seen on same-day CT. Continued radiographic follow-up to resolution is advised.   MACRO: Randy Borrego discussed the significance and urgency of this critical finding by telephone with  RUSSELL SARABIA on 4/22/2025 at 1:32 am. (**-RCF-**) Findings:  See findings.   Signed by: Randy Borrego 4/22/2025 1:32 AM Dictation workstation:   [id number]    CT chest abdomen pelvis w IV contrast  Result Date: 4/21/2025  Interpreted By:  Ankur Mckeon, STUDY: CT CHEST ABDOMEN PELVIS W IV CONTRAST;  4/21/2025 6:40 pm   INDICATION: Signs/Symptoms:Unresponsive     COMPARISON: June 8, 2016 chest CT, September 4, 2024 pelvis CT, February 24, 2025 pelvic ultrasound, April 21, 2025 chest radiograph, April 21, 2025 CT cervical spine:   ACCESSION NUMBER(S): [id number]   ORDERING CLINICIAN: ERAN POLLARD   TECHNIQUE: CT of the chest, abdomen, and pelvis was performed. Contiguous axial images were obtained at  5 mm slice thickness through the chest, and at  3 mm through the abdomen and pelvis. Coronal and sagittal reconstructions at  3 mm slice thickness were performed.  120 ML  of Omnipaque 350 was administered intravenously without immediate complication.   FINDINGS: Assessment is limited related to quantum mottle artifact and streak artifact from overlapping radiopaque structures including arm down position.   CHEST:   LUNG/PLEURA/LARGE AIRWAYS:   The patient is intubated. Satisfactory position of the endotracheal tube. There extensive bilateral dense airspace opacities with adjacent ground-glass and reticular changes favoring bilateral pneumonia these involving the lungs especially the upper midportions favoring pneumonia or ARDS. No well-delineated effusions.   VESSELS: No traumatic aortic injury is appreciated within the limitations of this non-EKG gated study.  The thoracic aorta is of normal course and caliber.  Main pulmonary artery and its branches are normal in caliber.  No coronary artery calcifications are seen.   HEART: Heart size is similar. There is no pericardial effusion.   MEDIASTINUM AND LUCÍA: No pneumomediastinum, abnormal mediastinal fluid collection or mediastinal hematoma are appreciated.  No mediastinal, hilar or biaxillary adenopathy is present although limited assessment. The esophagus is poorly assessed.   CHEST WALL AND LOWER NECK: Multiple newly seen rib deformities involving the right-sided ribs which appear chronic with evidence of healing response the accuracy for acute rib fractures diminished. No obvious acute fracture. No well-delineated acute displaced fracture or suspicious osseous lesion. No evident body wall hematoma.   ABDOMEN:   LIVER: No focal perfusion abnormality of the liver is appreciated to suggest contusion or laceration. There is no subcapsular hematoma, no perihepatic fluid collection.  Probable steatosis.   GALLBLADDER: Gallbladder is nondistended containing multiple gallstones.   BILE DUCTS: The intahepatic and extrahepatic bile ducts are not dilated.   PANCREAS: The pancreas appears unremarkable.   SPLEEN: No parenchymal perfusion  deficit of the spleen is appreciated to suggest contusion or laceration. There is no subcapsular hematoma, no perisplenic fluid collection.   ADRENAL GLANDS: The bilateral adrenal glands are unremarkable in appearance.   KIDNEYS AND URETERS: No parenchymal perfusion deficit is appreciated in bilateral kidneys to suggest contusion or laceration. There is no subcapsular hematoma, no perinephric fluid collection.  No hydroureteronephrosis or nephroureterolithiasis is present.   PELVIS:   BLADDER: The urinary bladder appears within normal limits.   REPRODUCTIVE ORGANS: No significant abnormality.   BOWEL: The stomach is unremarkable.  The small bowel is normal in caliber without evidence of focal wall thickening or obstruction.  There is no evidence of focal wall thickening or dilatation of the large bowel.  Normal appendix.   VESSELS: The aorta and IVC are within normal limits.  The principal vasculature of the abdomen and pelvis is patent.   PERITONEUM/RETROPERITONEUM/LYMPH NODES: There is no evidence of intra- or retroperitoneal hematoma.  There is no free or loculated fluid collection, no free intraperitoneal air. No abdominopelvic lymphadenopathy is present.   BONES AND ABDOMINAL WALL: No evidence of acute fracture or dislocation of the included osseous structures.  No suspicious osseous lesions are identified.  There is a right femoral intramedullary jeremiah. No evident body wall hematoma.         Extensive bilateral pneumonia or ARDS.   Satisfactory position of the endotracheal tube.   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   No separate acute detected abnormality although limited assessment related to quantum mottle artifact and beam hardening artifact.   Signed by: Ankur Mckeon 4/21/2025 7:18 PM Dictation workstation:   [id number]    CT head wo IV contrast  Addendum Date: 4/21/2025  Interpreted By:  Ankur Mckeon, ADDENDUM: The conclusion was inadvertently interrupted.   Head:    Massive amount of intracranial hemorrhage of indeterminate cause, including extra-axial, parenchymal, and intraventricular with severe mass effect including pronounced subfalcine herniation, unilateral descending transtentorial herniation and uncal herniation. Recommend urgent surgical consultation.   Cervical spine: No acute fracture or subluxation.   Degenerative changes.   Extensive bilateral pneumonia.   Ankur Mckeon discussed the significance and urgency of this critical finding by secure chat with  ERAN POLLARD on 4/21/2025 at 7:05 pm.  (**-RCF-**) Findings:  See findings.         Signed by: Ankur Mckeon 4/21/2025 7:06 PM   -------- ORIGINAL REPORT -------- Dictation workstation:   [id number]    Result Date: 4/21/2025  Interpreted By:  Ankur Mckeon, STUDY: CT HEAD WO IV CONTRAST; CT CERVICAL SPINE WO IV CONTRAST;  4/21/2025 6:38 pm   INDICATION: Trauma. Signs/Symptoms:Unresponsive.     COMPARISON: Same day CT chest abdomen and pelvis   ACCESSION NUMBER(S): [id number]; [id number]   ORDERING CLINICIAN: ERAN POLLARD   TECHNIQUE: Axial noncontrast head and cervical spine CT   FINDINGS: Head:   There is massive acute intracranial hemorrhage including large left-sided subdural which is mildly heterogeneous including portion estimated at 14 mm transverse dimension image 6/25 spanning virtually the entire AP dimension at the vertex. There is probably hemorrhage along the tentorium. There is a smaller right-sided extra-axial hematoma estimated at 5 mm transverse diameter image 6/23.   There is a large left frontal intraparenchymal hemorrhage including portion noted on image 6/18 measuring about 6.3 cm AP diameter.   There is a large amount of intraventricular and subarachnoid hemorrhage.   There is asymmetric crowding of the sulci related to mass effect.   There are findings compatible with pronounced subfalcine herniation, uncal herniation and left-sided unilateral descending transtentorial herniation  severe right-sided shift estimated at 16 mm image 6/18. There is effacement of the left temporal horn.   Calvarium: No evidence of fracture was identified.   Paranasal sinuses and mastoids: There is fluid and/or secretions within the posterior nasal passage and nasopharynx.   Cervical spine:   Alignment: Including images from the same day CT chest abdomen and pelvis which includes the cervicothoracic junction no significant subluxation.   Fracture: No acute fracture.   Craniocervical junction: Marked tonsillar herniation and intracranial hemorrhage.   Vertebra and intervertebral disc spaces: Mild-to-moderate degenerative changes most pronounced C5-6.   Alignment: No subluxation.   Paravertebral soft tissues: The patient is intubated. There is extensive bilateral consolidative pneumonia.   Brain Injury (BIG) guidelines CT values:   Skull fracture: No SDH (subdural hematoma): >=8mm EDH (epidural hemtoma): None detected IPH (intraparenchymal hemorrhage): >=8mm, multiple locations SAH (subarachnoid hemorrhage): Scattered IVH (intraventricular hemorrhage): Yes   Reference: James WIKNLER, Mei RS, Nery M, et al. The BIG (brain injury guidelines) project: defining the management of traumatic brain injury by acute care surgeons. J Trauma Acute Care Surg. 2014;76:782z215.       Massive amount of intracranial hemorrhage with severe mass effect including pronounced subfalcine herniation, unilateral descending transtentorial herniation and   MACRO: None     Signed by: Ankur Mckeon 4/21/2025 7:00 PM Dictation workstation:   [id number]    CT cervical spine wo IV contrast  Addendum Date: 4/21/2025  Interpreted By:  Ankur Mckeon, ADDENDUM: The conclusion was inadvertently interrupted.   Head:   Massive amount of intracranial hemorrhage of indeterminate cause, including extra-axial, parenchymal, and intraventricular with severe mass effect including pronounced subfalcine herniation, unilateral descending transtentorial  herniation and uncal herniation. Recommend urgent surgical consultation.   Cervical spine: No acute fracture or subluxation.   Degenerative changes.   Extensive bilateral pneumonia.   Ankur Mckeon discussed the significance and urgency of this critical finding by secure chat with  ERAN POLLARD on 4/21/2025 at 7:05 pm.  (**-RCF-**) Findings:  See findings.         Signed by: Ankur Mckeon 4/21/2025 7:06 PM   -------- ORIGINAL REPORT -------- Dictation workstation:   [id number]    Result Date: 4/21/2025  Interpreted By:  Ankur Mckeon, STUDY: CT HEAD WO IV CONTRAST; CT CERVICAL SPINE WO IV CONTRAST;  4/21/2025 6:38 pm   INDICATION: Trauma. Signs/Symptoms:Unresponsive.     COMPARISON: Same day CT chest abdomen and pelvis   ACCESSION NUMBER(S): [id number]; [id number]   ORDERING CLINICIAN: ERAN POLLARD   TECHNIQUE: Axial noncontrast head and cervical spine CT   FINDINGS: Head:   There is massive acute intracranial hemorrhage including large left-sided subdural which is mildly heterogeneous including portion estimated at 14 mm transverse dimension image 6/25 spanning virtually the entire AP dimension at the vertex. There is probably hemorrhage along the tentorium. There is a smaller right-sided extra-axial hematoma estimated at 5 mm transverse diameter image 6/23.   There is a large left frontal intraparenchymal hemorrhage including portion noted on image 6/18 measuring about 6.3 cm AP diameter.   There is a large amount of intraventricular and subarachnoid hemorrhage.   There is asymmetric crowding of the sulci related to mass effect.   There are findings compatible with pronounced subfalcine herniation, uncal herniation and left-sided unilateral descending transtentorial herniation severe right-sided shift estimated at 16 mm image 6/18. There is effacement of the left temporal horn.   Calvarium: No evidence of fracture was identified.   Paranasal sinuses and mastoids: There is fluid and/or secretions  within the posterior nasal passage and nasopharynx.   Cervical spine:   Alignment: Including images from the same day CT chest abdomen and pelvis which includes the cervicothoracic junction no significant subluxation.   Fracture: No acute fracture.   Craniocervical junction: Marked tonsillar herniation and intracranial hemorrhage.   Vertebra and intervertebral disc spaces: Mild-to-moderate degenerative changes most pronounced C5-6.   Alignment: No subluxation.   Paravertebral soft tissues: The patient is intubated. There is extensive bilateral consolidative pneumonia.   Brain Injury (BIG) guidelines CT values:   Skull fracture: No SDH (subdural hematoma): >=8mm EDH (epidural hemtoma): None detected IPH (intraparenchymal hemorrhage): >=8mm, multiple locations SAH (subarachnoid hemorrhage): Scattered IVH (intraventricular hemorrhage): Yes   Reference: James WINKLER, Mei RS, Nery M, et al. The BIG (brain injury guidelines) project: defining the management of traumatic brain injury by acute care surgeons. J Trauma Acute Care Surg. 2014;76:061u137.       Massive amount of intracranial hemorrhage with severe mass effect including pronounced subfalcine herniation, unilateral descending transtentorial herniation and   MACRO: None     Signed by: Ankur Mckeon 4/21/2025 7:00 PM Dictation workstation:   [id number]    XR chest 1 view  Result Date: 4/21/2025  Interpreted By:  Nathen William, STUDY: XR CHEST 1 VIEW   INDICATION: Signs/Symptoms:Chest Pain.   COMPARISON: August 12, 2021   ACCESSION NUMBER(S): [id number]   ORDERING CLINICIAN: ERAN POLLARD   FINDINGS: Intubation with the endotracheal tube 5 cm proximal to the radha.   Interval development of multifocal fairly extensive perihilar and upper lobe airspace opacities.   No evidence of pneumothorax.         Intubation with the endotracheal tube 5 cm proximal to the radha.   Interval development of multifocal fairly extensive perihilar and upper lobe airspace  opacities.   No evidence of pneumothorax.   Signed by: Nathen William 4/21/2025 6:13 PM Dictation workstation:   [id number]    Assessment & Plan  Intracranial bleed (Multi)    54 YOM with h/o CHF, HTN, DLP, CKD, BPH, asthma, IBS with diarrhea, polyneuropathy, impaired fasting glucose, who presented to the ED after beng found unresponsive. He was intubated upon arrival to the ED. Work up revealed leukocytosis, hyperglycemia, bilateral airspace opacities on CXR, and massive ICH with significant mass effect on head CT. Neurosurgery was called who did not think the patient injury was survivable. Patient now is admitted to ICU for further care until he is transitioned to comfort care measures.     Neuro: ICH with early sign of herniation. Per neurosurgery, no salvageable. H/o polyneuropathy       Neuro consult for neuro-prognostication       Try to minimize sedation       Monitor for brainstem reflexes    CV: hemodynamic instability likely due to increased ICP and volume loss 2/2 DI. Still requiring low dose of Levophed. Patient with h/o CHF, DLP, HTN.        Continue Levophed, wean off as tolerates       Continue IVF NS @ 150 ml       Hold home BP medications.        Continue statins     Respiratory: acute hypoxic respiratory failure 2/2 pneumonia vs ARDS. Vent requirements overall minimal. But unable to protect airways.        Continue current MV: 550/20/8/60%       Albuterol PRN         Renal: increased urine output due to DI. Cr WNL. H/o BPH, CKD II       Maintain BP with IVF and Levophed as above       Is/Os       Daily RFP       Treat electrolyte abnormalities as indicated    GI: h/o IBS with diarrhea, no acute issues        NPO        GI prophylaxis    Endo: no acute issues        Continue to monitor         Hypoglycemic protocol        SSI    Hem/Onc: no issues        Continue to monitor with daily CBC    ID: patient with possible aspiration pneumonia. Received Zosyn in the ED, Family wants to withdraw care. Per  [facility] not a candidate for lung transplant        Will monitor for onw    DVT prophylaxis: SDC, hold off chemical due to massive ICH.   This was a critical care visit for respiratory failure and ICH, total time 60 min.     René Romero MD

## 2025-04-22 NOTE — SIGNIFICANT EVENT
Changes on  ventilator made post ABG results and speaking with Dr. Romero.  Respiratory rate increase to 20bpm and fio2 decreased to 60%.

## 2025-04-22 NOTE — SIGNIFICANT EVENT
Friends and family are at bedside including pt atilio Weiss, pt brother Carlos Escalante, pt sister-in-law, and additional friends. All are agreeable that pt has stated before that his brother Carlos is the medical POA but it is unknown if there is paperwork of this somewhere.      After additional discussions, it is found that his brother would be the noted next of kin/decision maker regarding the patient in the event that he is unable to make his own decisions.  Pt does have a daughter from a previous marriage, but it not a capable decision maker as she has Down's syndrome.  Pt adoptive mother is unable to make decisions as she is currently hospitalized herself and can not make her own decisions.  Pt step-father (not adoptive father) is her decision maker at this time.     After reviewing all current medical information with the pt brother, he would like to withdraw care at this time given the intracranial hemorrhage and him not being a surgical candidate per neurosurgery consult.  After additional discussions, the decision was made that the patient would be a lifebanc candidate and they would like to precede with organ donation at this time.      Alhaji Henry PA-C  Pulmonary and Critical Care Medicine   [facility]

## 2025-04-23 NOTE — PROCEDURES
Insert arterial line    Date/Time: 4/23/2025 12:35 AM    Performed by: ONDINA Guerrero  Authorized by: ONDINA Guerrero    Consent:     Consent obtained:  Written    Consent given by:  Healthcare agent    Risks, benefits, and alternatives were discussed: yes      Risks discussed:  Repeat procedure  Universal protocol:     Relevant documents present and verified: yes      Test results available: yes      Patient identity confirmed:  Hospital-assigned identification number and arm band  Indications:     Indications: hemodynamic monitoring and multiple ABGs    Pre-procedure details:     Skin preparation:  Chlorhexidine    Preparation: Patient was prepped and draped in sterile fashion    Sedation:     Sedation type:  None  Anesthesia:     Anesthesia method:  None  Procedure details:     Location:  L radial    Needle gauge:  18 G    Placement technique:  Ultrasound guided and Seldinger    Number of attempts:  1    Transducer: waveform confirmed    Post-procedure details:     Post-procedure:  Sterile dressing applied and sutured    Procedure completion:  Tolerated well, no immediate complications  Comments:      Proctored by BYRON Craft

## 2025-04-23 NOTE — SIGNIFICANT EVENT
The routine EEG has been read and the report can be found in Epic under the media tab.     Xu Salazar, DO  Epilepsy Fellow

## 2025-04-23 NOTE — CONSULTS
Wound Care Consult     Visit Date: 4/23/2025      Patient Name: Mu Escalante         MRN: [id number]             Reason for Consult: Wound of Scrotum s/p  wound debridement.        Wound History: Defer to the patient's chart     Pertinent Labs:   Defer to the patient's chart      Assessment:   Wound care recommendations:  Keep wound and affected area clean ,dry , and covered.                       Wound Plan: Defer to the patient's chart and progress notes of the providers      Joshua Allen RN  4/23/2025  2:07 PM

## 2025-04-23 NOTE — PROGRESS NOTES
Mu Escalante is a 54 y.o. male on day 2 of admission presenting with Intracranial bleed (Multi).  Patient with h/o CHF, HTN, DLP, CKD, BPH, asthma, IBS with diarrhea, polyneuropathy, impaired fasting glucose, who presented to the ED after beng found unresponsive. He was intubated upon arrival to the ED. Work up revealed leukocytosis, hyperglycemia, bilateral airspace opacities on CXR, and massive ICH with significant mass effect on head CT. Neurosurgery was called who did not think the patient injury was survivable. Patient now is admitted to ICU for further care until he is transitioned to comfort care measures.   Subjective   Worsening [place] overnight. BP labile. Also developing hypernatremia. Received a bolus of LR.      The patient is unresponsive and cannot provide history   Objective   Scheduled medications  insulin lispro, 0-5 Units, subcutaneous, q4h  oxygen, , inhalation, Continuous - Inhalation  pantoprazole, 40 mg, intravenous, Daily before breakfast  potassium phosphate, 15 mmol, intravenous, Once  rosuvastatin, 20 mg, oral, Nightly    Continuous medications  norepinephrine, 0-0.5 mcg/kg/min, Last Rate: 0.19 mcg/kg/min (04/23/25 0641)  sodium chloride 0.9%, 150 mL/hr, Last Rate: 150 mL/hr (04/23/25 0641)    PRN medications  PRN medications: albuterol, dextrose, dextrose, glucagon, glucagon, hydrALAZINE, labetaloL, ondansetron, polyethylene glycol   Physical Exam  Constitutional:       General: He is not in acute distress.     Appearance: He is obese.   HENT:      Head: Normocephalic and atraumatic.      Mouth/Throat:      Comments: ET and G tube in place.  Eyes:      General: No scleral icterus.     Comments: [place]-orbital ecchymosis L eye, per family birth carmen. Pupils dilated and fixed.    Neck:      Vascular: No carotid bruit.   Cardiovascular:      Rate and Rhythm: Normal rate and regular rhythm.      Heart sounds: No murmur heard.     No friction rub. No gallop.   Pulmonary:      Effort: Pulmonary  "effort is normal. No respiratory distress.      Breath sounds: Rales (diffuse bilatera.) present. No wheezing.   Abdominal:      General: Bowel sounds are normal. There is no distension.      Palpations: Abdomen is soft.      Tenderness: There is no abdominal tenderness.   Musculoskeletal:         General: No tenderness.      Cervical back: Neck supple. No rigidity or tenderness.      Right lower leg: Edema (2+) present.      Left lower leg: Edema (2+) present.   Skin:     General: Skin is warm and dry.      Coloration: Skin is not jaundiced.      Findings: Bruising (L facial.) and erythema (Erythema/bruising b/l LE) present.   Neurological:      Mental Status: He is alert.      Comments: Unresponsive, cannot assess   Psychiatric:      Comments: Cannot assess, unresponsive     Last Recorded Vitals  Blood pressure 94/67, pulse 89, temperature 37.8 °C (100 °F), temperature source Oral, resp. rate 20, height 1.93 m (6' 4\"), weight (!) 172 kg (380 lb 1.2 oz), SpO2 93%.  Intake/Output last 3 Shifts:  I/O last 3 completed shifts:  In: 7816.1 (45.3 mL/kg) [I.V.:5117.1 (29.7 mL/kg); IV Piggyback:2699]  Out: 8680 (50.3 mL/kg) [Urine:7480 (1.2 mL/kg/hr); Emesis/NG output:1200]  Weight: 172.4 kg   Relevant Results  Results for orders placed or performed during the hospital encounter of 04/21/25 (from the past 24 hours)   POCT GLUCOSE   Result Value Ref Range    POCT Glucose 166 (H) 74 - 99 mg/dL   POCT GLUCOSE   Result Value Ref Range    POCT Glucose 178 (H) 74 - 99 mg/dL   Blood Gas Arterial Full Panel   Result Value Ref Range    POCT pH, Arterial 7.32 (L) 7.38 - 7.42 pH    POCT pCO2, Arterial 60 (H) 38 - 42 mm Hg    POCT pO2, Arterial 240 (H) 85 - 95 mm Hg    POCT SO2, Arterial 100 94 - 100 %    POCT Oxy Hemoglobin, Arterial 97.8 94.0 - 98.0 %    POCT Hematocrit Calculated, Arterial 47.0 41.0 - 52.0 %    POCT Sodium, Arterial 141 136 - 145 mmol/L    POCT Potassium, Arterial 4.7 3.5 - 5.3 mmol/L    POCT Chloride, Arterial 108 " (H) 98 - 107 mmol/L    POCT Ionized Calcium, Arterial 1.25 1.10 - 1.33 mmol/L    POCT Glucose, Arterial 188 (H) 74 - 99 mg/dL    POCT Lactate, Arterial 1.4 0.4 - 2.0 mmol/L    POCT Base Excess, Arterial 2.9 -2.0 - 3.0 mmol/L    POCT HCO3 Calculated, Arterial 30.9 (H) 22.0 - 26.0 mmol/L    POCT Hemoglobin, Arterial 15.8 13.5 - 17.5 g/dL    POCT Anion Gap, Arterial 7 (L) 10 - 25 mmo/L    Patient Temperature 37.0 degrees Celsius    FiO2 100 %    Apparatus      Ventilator Mode A/C     Ventilator Rate 16 bpm    Tidal Volume 550 mL    Spontaneous Tidal Volume 558 mL    Total Minute Volume 8.9 Liter    Peep CHM2O 8.0 cm H2O    Site of Arterial Puncture Arterial Line     Joey's Test Negative    Basic metabolic panel   Result Value Ref Range    Glucose 177 (H) 74 - 99 mg/dL    Sodium 144 136 - 145 mmol/L    Potassium 4.5 3.5 - 5.3 mmol/L    Chloride 110 (H) 98 - 107 mmol/L    Bicarbonate 30 21 - 32 mmol/L    Anion Gap 9 (L) 10 - 20 mmol/L    Urea Nitrogen 16 6 - 23 mg/dL    Creatinine 1.04 0.50 - 1.30 mg/dL    eGFR 84 >60 mL/min/1.73m*2    Calcium 9.1 8.6 - 10.3 mg/dL   CBC   Result Value Ref Range    WBC 16.2 (H) 4.4 - 11.3 x10*3/uL    nRBC 0.0 0.0 - 0.0 /100 WBCs    RBC 4.87 4.50 - 5.90 x10*6/uL    Hemoglobin 15.6 13.5 - 17.5 g/dL    Hematocrit 46.8 41.0 - 52.0 %    MCV 96 80 - 100 fL    MCH 32.0 26.0 - 34.0 pg    MCHC 33.3 32.0 - 36.0 g/dL    RDW 13.4 11.5 - 14.5 %    Platelets 196 150 - 450 x10*3/uL   Calcium, ionized   Result Value Ref Range    POCT Calcium, Ionized 1.21 1.1 - 1.33 mmol/L   Phosphorus   Result Value Ref Range    Phosphorus 2.9 2.5 - 4.9 mg/dL   Magnesium   Result Value Ref Range    Magnesium 2.00 1.60 - 2.40 mg/dL   Lactate   Result Value Ref Range    Lactate 1.2 0.4 - 2.0 mmol/L   Hepatic function panel   Result Value Ref Range    Albumin 3.6 3.4 - 5.0 g/dL    Bilirubin, Total 0.8 0.0 - 1.2 mg/dL    Bilirubin, Direct 0.2 0.0 - 0.3 mg/dL    Alkaline Phosphatase 63 33 - 120 U/L    ALT 27 10 - 52 U/L     AST 18 9 - 39 U/L    Total Protein 6.5 6.4 - 8.2 g/dL   Protime-INR   Result Value Ref Range    Protime 12.9 (H) 9.3 - 12.7 seconds    INR 1.2 0.9 - 1.2   APTT   Result Value Ref Range    aPTT 27.5 22.0 - 32.5 seconds   POCT GLUCOSE   Result Value Ref Range    POCT Glucose 173 (H) 74 - 99 mg/dL   POCT GLUCOSE   Result Value Ref Range    POCT Glucose 146 (H) 74 - 99 mg/dL   POCT GLUCOSE   Result Value Ref Range    POCT Glucose 135 (H) 74 - 99 mg/dL   Blood Gas Arterial Full Panel   Result Value Ref Range    POCT pH, Arterial 7.40 7.38 - 7.42 pH    POCT pCO2, Arterial 46 (H) 38 - 42 mm Hg    POCT pO2, Arterial 79 (L) 85 - 95 mm Hg    POCT SO2, Arterial 97 94 - 100 %    POCT Oxy Hemoglobin, Arterial 95.5 94.0 - 98.0 %    POCT Hematocrit Calculated, Arterial 45.0 41.0 - 52.0 %    POCT Sodium, Arterial 147 (H) 136 - 145 mmol/L    POCT Potassium, Arterial 4.2 3.5 - 5.3 mmol/L    POCT Chloride, Arterial 115 (H) 98 - 107 mmol/L    POCT Ionized Calcium, Arterial 1.27 1.10 - 1.33 mmol/L    POCT Glucose, Arterial 125 (H) 74 - 99 mg/dL    POCT Lactate, Arterial 1.8 0.4 - 2.0 mmol/L    POCT Base Excess, Arterial 2.9 -2.0 - 3.0 mmol/L    POCT HCO3 Calculated, Arterial 28.5 (H) 22.0 - 26.0 mmol/L    POCT Hemoglobin, Arterial 15.0 13.5 - 17.5 g/dL    POCT Anion Gap, Arterial 8 (L) 10 - 25 mmo/L    Patient Temperature 37.0 degrees Celsius    FiO2 60 %    Apparatus      Ventilator Mode      Ventilator Rate 20 bpm    Tidal Volume 550 mL    Peep CHM2O 8.0 cm H2O    Site of Arterial Puncture Arterial Line     Joey's Test     Basic metabolic panel   Result Value Ref Range    Glucose 133 (H) 74 - 99 mg/dL    Sodium 150 (H) 136 - 145 mmol/L    Potassium 4.0 3.5 - 5.3 mmol/L    Chloride 116 (H) 98 - 107 mmol/L    Bicarbonate 29 21 - 32 mmol/L    Anion Gap 9 (L) 10 - 20 mmol/L    Urea Nitrogen 17 6 - 23 mg/dL    Creatinine 1.31 (H) 0.50 - 1.30 mg/dL    eGFR 64 >60 mL/min/1.73m*2    Calcium 9.1 8.6 - 10.3 mg/dL   CBC   Result Value Ref Range     WBC 14.9 (H) 4.4 - 11.3 x10*3/uL    nRBC 0.0 0.0 - 0.0 /100 WBCs    RBC 4.34 (L) 4.50 - 5.90 x10*6/uL    Hemoglobin 14.2 13.5 - 17.5 g/dL    Hematocrit 43.7 41.0 - 52.0 %     (H) 80 - 100 fL    MCH 32.7 26.0 - 34.0 pg    MCHC 32.5 32.0 - 36.0 g/dL    RDW 13.6 11.5 - 14.5 %    Platelets 145 (L) 150 - 450 x10*3/uL   Calcium, ionized   Result Value Ref Range    POCT Calcium, Ionized 1.22 1.1 - 1.33 mmol/L   Phosphorus   Result Value Ref Range    Phosphorus 2.0 (L) 2.5 - 4.9 mg/dL   Magnesium   Result Value Ref Range    Magnesium 2.04 1.60 - 2.40 mg/dL   Lactate   Result Value Ref Range    Lactate 1.1 0.4 - 2.0 mmol/L   Hepatic function panel   Result Value Ref Range    Albumin 3.3 (L) 3.4 - 5.0 g/dL    Bilirubin, Total 1.0 0.0 - 1.2 mg/dL    Bilirubin, Direct 0.2 0.0 - 0.3 mg/dL    Alkaline Phosphatase 54 33 - 120 U/L    ALT 19 10 - 52 U/L    AST 12 9 - 39 U/L    Total Protein 6.0 (L) 6.4 - 8.2 g/dL   Protime-INR   Result Value Ref Range    Protime 11.9 9.3 - 12.7 seconds    INR 1.1 0.9 - 1.2   APTT   Result Value Ref Range    aPTT 25.6 22.0 - 32.5 seconds   Urinalysis with Reflex Culture and Microscopic   Result Value Ref Range    Color, Urine Light-Orange (N) Light-Yellow, Yellow, Dark-Yellow    Appearance, Urine Clear Clear    Specific Gravity, Urine 1.013 1.005 - 1.035    pH, Urine 6.0 5.0, 5.5, 6.0, 6.5, 7.0, 7.5, 8.0    Protein, Urine 20 (TRACE) NEGATIVE, 10 (TRACE), 20 (TRACE) mg/dL    Glucose, Urine Normal Normal mg/dL    Blood, Urine 0.2 (2+) (A) NEGATIVE mg/dL    Ketones, Urine NEGATIVE NEGATIVE mg/dL    Bilirubin, Urine NEGATIVE NEGATIVE mg/dL    Urobilinogen, Urine Normal Normal mg/dL    Nitrite, Urine NEGATIVE NEGATIVE    Leukocyte Esterase, Urine NEGATIVE NEGATIVE   Urinalysis Microscopic   Result Value Ref Range    WBC, Urine 6-10 (A) 1-5, NONE /HPF    WBC Clumps, Urine OCCASIONAL Reference range not established. /HPF    RBC, Urine 11-20 (A) NONE, 1-2, 3-5 /HPF    Bacteria, Urine 1+ (A) NONE  SEEN /HPF    Budding Yeast, Urine NONE NONE /HPF    Mucus, Urine FEW Reference range not established. /LPF    Hyaline Casts, Urine OCCASIONAL (A) NONE /LPF    Fine Granular Casts, Urine 1+ (A) NONE /LPF   POCT GLUCOSE   Result Value Ref Range    POCT Glucose 95 74 - 99 mg/dL   Amylase   Result Value Ref Range    Amylase 20 (L) 29 - 103 U/L   Lipase   Result Value Ref Range    Lipase 16 9 - 82 U/L   Basic metabolic panel   Result Value Ref Range    Glucose 134 (H) 74 - 99 mg/dL    Sodium 151 (H) 136 - 145 mmol/L    Potassium 3.9 3.5 - 5.3 mmol/L    Chloride 118 (H) 98 - 107 mmol/L    Bicarbonate 30 21 - 32 mmol/L    Anion Gap 7 (L) 10 - 20 mmol/L    Urea Nitrogen 20 6 - 23 mg/dL    Creatinine 1.52 (H) 0.50 - 1.30 mg/dL    eGFR 53 (L) >60 mL/min/1.73m*2    Calcium 8.8 8.6 - 10.3 mg/dL   POCT GLUCOSE   Result Value Ref Range    POCT Glucose 128 (H) 74 - 99 mg/dL   CT head wo IV contrast  Result Date: 4/23/2025  Interpreted By:  Blaine Beatty, STUDY: CT HEAD WO IV CONTRAST;  4/22/2025 5:14 pm   INDICATION: Signs/Symptoms:ICH.   COMPARISON: 04/21/2025   ACCESSION NUMBER(S): [id number]   ORDERING CLINICIAN: YOLANDE DECKER   TECHNIQUE: Axial CT images of the head were obtained without intravenous contrast administration.   FINDINGS: There is again evidence of a large amount of hyperdense intracranial hemorrhage.   Specifically, there are bihemispheric subdural hematomas left greater than right similar in size when compared with the prior study dated 04/21/2025. Specifically, using similar points of measurement on the current and prior study, the left hemispheric subdural hematoma again measures approximately 13 mm in thickness adjacent to the left frontal lobe and the right hemispheric subdural hematoma again measures proximally 6 mm in greatest thickness adjacent to the right frontal lobe. There is similar mass effect upon the adjacent cerebral hemispheres left greater than right.   There is again evidence of  hyperdense subarachnoid hemorrhage as well as hyperdense intraventricular hemorrhage similar when compared with the prior study.   A more focal area of hyperdense hemorrhage likely intraparenchymal in location is identified along the inferior left frontal lobe similar in size and configuration when compared with the prior study. There is surrounding hypodensity compatible with surrounding edema. There is similar associated mass effect.   There is again evidence of a proximally 16 mm of midline shift from left to right similar when compared with the prior exam.   There is again evidence of effacement of sulci as well as the suprasellar/basilar cisterns diffusely. There is partial effacement of the lateral ventricles left greater than right and 3rd ventricle. There is mild ventriculomegaly involving the non effaced portions of the right lateral ventricle as well as mild-to-moderate ventriculomegaly involving the hyperdense hemorrhagic filled 4th ventricle. There is mild nonspecific hypodensity surrounding the right lateral ventricle and 4th ventricle which while nonspecific may represent a component of transependymal edema.   There is a stable 7 mm focus of dystrophic calcification within the right frontoparietal region     There are secretions and mucosal thickening contributing to partial opacification left sphenoid sinus. Mucosal thickening is noted within the right sphenoid sinus and to a lesser degree partially imaged maxillary sinuses. There is opacification and partial opacification of scattered ethmoid air cells.   There is opacification/partial opacification of scattered mastoid air cells bilaterally.         There is again evidence of a large amount of hyperdense intracranial hemorrhage.   Specifically, there are bihemispheric subdural hematomas left greater than right similar in size when compared with the prior study dated 04/21/2025. Specifically, using similar points of measurement on the current and  prior study, the left hemispheric subdural hematoma again measures approximately 13 mm in thickness adjacent to the left frontal lobe and the right hemispheric subdural hematoma again measures proximally 6 mm in greatest thickness adjacent to the right frontal lobe. There is similar mass effect upon the adjacent cerebral hemispheres left greater than right.   There is again evidence of hyperdense subarachnoid hemorrhage as well as hyperdense intraventricular hemorrhage similar when compared with the prior study.   A more focal area of hyperdense hemorrhage likely intraparenchymal in location is identified along the inferior left frontal lobe similar in size and configuration when compared with the prior study. There is surrounding hypodensity compatible with surrounding edema. There is similar associated mass effect.   There is again evidence of a proximally 16 mm of midline shift from left to right similar when compared with the prior exam.   There is again evidence of effacement of sulci as well as the suprasellar/basilar cisterns diffusely. There is partial effacement of the lateral ventricles left greater than right and 3rd ventricle. There is mild ventriculomegaly involving the non effaced portions of the right lateral ventricle as well as mild-to-moderate ventriculomegaly involving the hyperdense hemorrhagic filled 4th ventricle. There is mild nonspecific hypodensity surrounding the right lateral ventricle and 4th ventricle which while nonspecific may represent a component of transependymal edema.   MACRO: None.   Signed by: Blaine Beatty 4/23/2025 6:31 AM Dictation workstation:   [id number]    ECG 12 lead  Result Date: 4/22/2025  Sinus tachycardia Pulmonary disease pattern Left anterior fascicular block Nonspecific ST abnormality Prolonged QT Abnormal ECG When compared with ECG of 21-MAR-2025 13:44, Heart rate has increased by 40 bpm Confirmed by Dillon Smith ([id number]) on 4/22/2025 11:52:24 AM    XR chest  abdomen for OG NG placement  Result Date: 4/22/2025  Interpreted By:  Dorina Pitts, STUDY: XR CHEST ABDOMEN FOR OG NG PLACEMENT 4/22/2025 6:01 am   INDICATION: Nasogastric tube repositioning   COMPARISON: 04/21/2025   ACCESSION NUMBER(S): [id number]   ORDERING CLINICIAN: RUSSELL SARABIA   TECHNIQUE: AP view of the chest and upper to mid abdomen is performed.   FINDINGS: The distal tip of the nasogastric tube is located within the proximal stomach just distal to the gastric cardia with side hole at the level of the GE junction.   The cardiac size is normal. The tip of the endotracheal tube is located 3 cm above radha. There is infiltrate within the left lung. Right perihilar infiltrate noted on the study done 1 day earlier has improved. There are several old healed posterior right rib fractures.       Distal tip of the nasogastric tube in proximal stomach just distal to the gastric cardia.   Tip of the endotracheal tube 3 cm above radha.   Left perihilar infiltrate appears unchanged with improved right perihilar infiltrate.   Signed by: Dorina Pitts 4/22/2025 8:03 AM Dictation workstation:   [id number]    XR chest 1 view  Result Date: 4/22/2025  Interpreted By:  Randy Borrego, STUDY: XR CHEST 1 VIEW;  4/21/2025 10:55 pm   INDICATION: Signs/Symptoms:OG placement.   COMPARISON: Chest x-ray 04/21/2025   ACCESSION NUMBER(S): [id number]   ORDERING CLINICIAN: RUSSELL SARABIA   FINDINGS: Endotracheal tube terminates approximately 6.0 cm above the radha. Enteric tube extends to level of the distal esophagus with tip not well visualized. Overlying leads are present.   CARDIOMEDIASTINAL SILHOUETTE: Cardiomediastinal silhouette is stable in size and configuration.   LUNGS: There are bilateral airspace opacities predominant in the a upper lobes concerning for developing airspace disease. No effusion or pneumothorax.   ABDOMEN: No remarkable upper abdominal findings.   BONES: No acute osseous abnormality.       Support hardware as  described above. Enteric tube extends to the distal esophagus with tip not well visualized. Consider repositioning and repeat imaging.   There bilateral airspace opacities concerning for multifocal pneumonia as seen on same-day CT. Continued radiographic follow-up to resolution is advised.   MACRO: Randy Borrego discussed the significance and urgency of this critical finding by telephone with  RUSSELL SARABIA on 4/22/2025 at 1:32 am. (**-RCF-**) Findings:  See findings.   Signed by: Randy Borrego 4/22/2025 1:32 AM Dictation workstation:   [id number]    CT chest abdomen pelvis w IV contrast  Result Date: 4/21/2025  Interpreted By:  Ankur Mckeon, STUDY: CT CHEST ABDOMEN PELVIS W IV CONTRAST;  4/21/2025 6:40 pm   INDICATION: Signs/Symptoms:Unresponsive     COMPARISON: June 8, 2016 chest CT, September 4, 2024 pelvis CT, February 24, 2025 pelvic ultrasound, April 21, 2025 chest radiograph, April 21, 2025 CT cervical spine:   ACCESSION NUMBER(S): [id number]   ORDERING CLINICIAN: ERAN POLLARD   TECHNIQUE: CT of the chest, abdomen, and pelvis was performed. Contiguous axial images were obtained at  5 mm slice thickness through the chest, and at  3 mm through the abdomen and pelvis. Coronal and sagittal reconstructions at  3 mm slice thickness were performed.  120 ML of Omnipaque 350 was administered intravenously without immediate complication.   FINDINGS: Assessment is limited related to quantum mottle artifact and streak artifact from overlapping radiopaque structures including arm down position.   CHEST:   LUNG/PLEURA/LARGE AIRWAYS:   The patient is intubated. Satisfactory position of the endotracheal tube. There extensive bilateral dense airspace opacities with adjacent ground-glass and reticular changes favoring bilateral pneumonia these involving the lungs especially the upper midportions favoring pneumonia or ARDS. No well-delineated effusions.   VESSELS: No traumatic aortic injury is appreciated within the  limitations of this non-EKG gated study.  The thoracic aorta is of normal course and caliber.  Main pulmonary artery and its branches are normal in caliber.  No coronary artery calcifications are seen.   HEART: Heart size is similar. There is no pericardial effusion.   MEDIASTINUM AND LUCÍA: No pneumomediastinum, abnormal mediastinal fluid collection or mediastinal hematoma are appreciated.  No mediastinal, hilar or biaxillary adenopathy is present although limited assessment. The esophagus is poorly assessed.   CHEST WALL AND LOWER NECK: Multiple newly seen rib deformities involving the right-sided ribs which appear chronic with evidence of healing response the accuracy for acute rib fractures diminished. No obvious acute fracture. No well-delineated acute displaced fracture or suspicious osseous lesion. No evident body wall hematoma.   ABDOMEN:   LIVER: No focal perfusion abnormality of the liver is appreciated to suggest contusion or laceration. There is no subcapsular hematoma, no perihepatic fluid collection.  Probable steatosis.   GALLBLADDER: Gallbladder is nondistended containing multiple gallstones.   BILE DUCTS: The intahepatic and extrahepatic bile ducts are not dilated.   PANCREAS: The pancreas appears unremarkable.   SPLEEN: No parenchymal perfusion deficit of the spleen is appreciated to suggest contusion or laceration. There is no subcapsular hematoma, no perisplenic fluid collection.   ADRENAL GLANDS: The bilateral adrenal glands are unremarkable in appearance.   KIDNEYS AND URETERS: No parenchymal perfusion deficit is appreciated in bilateral kidneys to suggest contusion or laceration. There is no subcapsular hematoma, no perinephric fluid collection.  No hydroureteronephrosis or nephroureterolithiasis is present.   PELVIS:   BLADDER: The urinary bladder appears within normal limits.   REPRODUCTIVE ORGANS: No significant abnormality.   BOWEL: The stomach is unremarkable.  The small bowel is normal in  caliber without evidence of focal wall thickening or obstruction.  There is no evidence of focal wall thickening or dilatation of the large bowel.  Normal appendix.   VESSELS: The aorta and IVC are within normal limits.  The principal vasculature of the abdomen and pelvis is patent.   PERITONEUM/RETROPERITONEUM/LYMPH NODES: There is no evidence of intra- or retroperitoneal hematoma.  There is no free or loculated fluid collection, no free intraperitoneal air. No abdominopelvic lymphadenopathy is present.   BONES AND ABDOMINAL WALL: No evidence of acute fracture or dislocation of the included osseous structures.  No suspicious osseous lesions are identified.  There is a right femoral intramedullary jeremiah. No evident body wall hematoma.         Extensive bilateral pneumonia or ARDS.   Satisfactory position of the endotracheal tube.   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   No separate acute detected abnormality although limited assessment related to quantum mottle artifact and beam hardening artifact.   Signed by: Ankur Mckeon 4/21/2025 7:18 PM Dictation workstation:   [id number]    CT head wo IV contrast  Addendum Date: 4/21/2025  Interpreted By:  Ankur Mckeon, ADDENDUM: The conclusion was inadvertently interrupted.   Head:   Massive amount of intracranial hemorrhage of indeterminate cause, including extra-axial, parenchymal, and intraventricular with severe mass effect including pronounced subfalcine herniation, unilateral descending transtentorial herniation and uncal herniation. Recommend urgent surgical consultation.   Cervical spine: No acute fracture or subluxation.   Degenerative changes.   Extensive bilateral pneumonia.   Ankur Mckeon discussed the significance and urgency of this critical finding by secure chat with  ERAN POLLARD on 4/21/2025 at 7:05 pm.  (**-RCF-**) Findings:  See findings.         Signed by: Ankur Mckeon 4/21/2025 7:06 PM   -------- ORIGINAL REPORT  -------- Dictation workstation:   [id number]    Result Date: 4/21/2025  Interpreted By:  Ankur Mckeon, STUDY: CT HEAD WO IV CONTRAST; CT CERVICAL SPINE WO IV CONTRAST;  4/21/2025 6:38 pm   INDICATION: Trauma. Signs/Symptoms:Unresponsive.     COMPARISON: Same day CT chest abdomen and pelvis   ACCESSION NUMBER(S): [id number]; [id number]   ORDERING CLINICIAN: ERAN POLLARD   TECHNIQUE: Axial noncontrast head and cervical spine CT   FINDINGS: Head:   There is massive acute intracranial hemorrhage including large left-sided subdural which is mildly heterogeneous including portion estimated at 14 mm transverse dimension image 6/25 spanning virtually the entire AP dimension at the vertex. There is probably hemorrhage along the tentorium. There is a smaller right-sided extra-axial hematoma estimated at 5 mm transverse diameter image 6/23.   There is a large left frontal intraparenchymal hemorrhage including portion noted on image 6/18 measuring about 6.3 cm AP diameter.   There is a large amount of intraventricular and subarachnoid hemorrhage.   There is asymmetric crowding of the sulci related to mass effect.   There are findings compatible with pronounced subfalcine herniation, uncal herniation and left-sided unilateral descending transtentorial herniation severe right-sided shift estimated at 16 mm image 6/18. There is effacement of the left temporal horn.   Calvarium: No evidence of fracture was identified.   Paranasal sinuses and mastoids: There is fluid and/or secretions within the posterior nasal passage and nasopharynx.   Cervical spine:   Alignment: Including images from the same day CT chest abdomen and pelvis which includes the cervicothoracic junction no significant subluxation.   Fracture: No acute fracture.   Craniocervical junction: Marked tonsillar herniation and intracranial hemorrhage.   Vertebra and intervertebral disc spaces: Mild-to-moderate degenerative changes most pronounced C5-6.    Alignment: No subluxation.   Paravertebral soft tissues: The patient is intubated. There is extensive bilateral consolidative pneumonia.   Brain Injury (BIG) guidelines CT values:   Skull fracture: No SDH (subdural hematoma): >=8mm EDH (epidural hemtoma): None detected IPH (intraparenchymal hemorrhage): >=8mm, multiple locations SAH (subarachnoid hemorrhage): Scattered IVH (intraventricular hemorrhage): Yes   Reference: James WINKLER, Mei RS, Nery M, et al. The BIG (brain injury guidelines) project: defining the management of traumatic brain injury by acute care surgeons. J Trauma Acute Care Surg. 2014;76:257f675.       Massive amount of intracranial hemorrhage with severe mass effect including pronounced subfalcine herniation, unilateral descending transtentorial herniation and   MACRO: None     Signed by: Ankur Mckeon 4/21/2025 7:00 PM Dictation workstation:   [id number]    CT cervical spine wo IV contrast  Addendum Date: 4/21/2025  Interpreted By:  Ankur Mckeon, ADDENDUM: The conclusion was inadvertently interrupted.   Head:   Massive amount of intracranial hemorrhage of indeterminate cause, including extra-axial, parenchymal, and intraventricular with severe mass effect including pronounced subfalcine herniation, unilateral descending transtentorial herniation and uncal herniation. Recommend urgent surgical consultation.   Cervical spine: No acute fracture or subluxation.   Degenerative changes.   Extensive bilateral pneumonia.   Ankur Mckeon discussed the significance and urgency of this critical finding by secure chat with  ERAN POLLARD on 4/21/2025 at 7:05 pm.  (**-RCF-**) Findings:  See findings.         Signed by: Ankur Mckeon 4/21/2025 7:06 PM   -------- ORIGINAL REPORT -------- Dictation workstation:   [id number]    Result Date: 4/21/2025  Interpreted By:  Ankur Mckeon, STUDY: CT HEAD WO IV CONTRAST; CT CERVICAL SPINE WO IV CONTRAST;  4/21/2025 6:38 pm   INDICATION: Trauma.  Signs/Symptoms:Unresponsive.     COMPARISON: Same day CT chest abdomen and pelvis   ACCESSION NUMBER(S): [id number]; [id number]   ORDERING CLINICIAN: ERAN POLLARD   TECHNIQUE: Axial noncontrast head and cervical spine CT   FINDINGS: Head:   There is massive acute intracranial hemorrhage including large left-sided subdural which is mildly heterogeneous including portion estimated at 14 mm transverse dimension image 6/25 spanning virtually the entire AP dimension at the vertex. There is probably hemorrhage along the tentorium. There is a smaller right-sided extra-axial hematoma estimated at 5 mm transverse diameter image 6/23.   There is a large left frontal intraparenchymal hemorrhage including portion noted on image 6/18 measuring about 6.3 cm AP diameter.   There is a large amount of intraventricular and subarachnoid hemorrhage.   There is asymmetric crowding of the sulci related to mass effect.   There are findings compatible with pronounced subfalcine herniation, uncal herniation and left-sided unilateral descending transtentorial herniation severe right-sided shift estimated at 16 mm image 6/18. There is effacement of the left temporal horn.   Calvarium: No evidence of fracture was identified.   Paranasal sinuses and mastoids: There is fluid and/or secretions within the posterior nasal passage and nasopharynx.   Cervical spine:   Alignment: Including images from the same day CT chest abdomen and pelvis which includes the cervicothoracic junction no significant subluxation.   Fracture: No acute fracture.   Craniocervical junction: Marked tonsillar herniation and intracranial hemorrhage.   Vertebra and intervertebral disc spaces: Mild-to-moderate degenerative changes most pronounced C5-6.   Alignment: No subluxation.   Paravertebral soft tissues: The patient is intubated. There is extensive bilateral consolidative pneumonia.   Brain Injury (BIG) guidelines CT values:   Skull fracture: No SDH (subdural  hematoma): >=8mm EDH (epidural hemtoma): None detected IPH (intraparenchymal hemorrhage): >=8mm, multiple locations SAH (subarachnoid hemorrhage): Scattered IVH (intraventricular hemorrhage): Yes   Reference: James WINKLER, Mei RS, Nery M, et al. The BIG (brain injury guidelines) project: defining the management of traumatic brain injury by acute care surgeons. J Trauma Acute Care Surg. 2014;76:467k166.       Massive amount of intracranial hemorrhage with severe mass effect including pronounced subfalcine herniation, unilateral descending transtentorial herniation and   MACRO: None     Signed by: Ankur Mckeon 4/21/2025 7:00 PM Dictation workstation:   [id number]    XR chest 1 view  Result Date: 4/21/2025  Interpreted By:  Nathen William, STUDY: XR CHEST 1 VIEW   INDICATION: Signs/Symptoms:Chest Pain.   COMPARISON: August 12, 2021   ACCESSION NUMBER(S): [id number]   ORDERING CLINICIAN: ERAN POLLARD   FINDINGS: Intubation with the endotracheal tube 5 cm proximal to the radha.   Interval development of multifocal fairly extensive perihilar and upper lobe airspace opacities.   No evidence of pneumothorax.         Intubation with the endotracheal tube 5 cm proximal to the radha.   Interval development of multifocal fairly extensive perihilar and upper lobe airspace opacities.   No evidence of pneumothorax.   Signed by: Nathen William 4/21/2025 6:13 PM Dictation workstation:   [id number]    Assessment & Plan  Intracranial bleed (Multi)    54 YOM with h/o CHF, HTN, DLP, CKD, BPH, asthma, IBS with diarrhea, polyneuropathy, impaired fasting glucose, who presented to the ED after beng found unresponsive. He was intubated upon arrival to the ED. Work up revealed leukocytosis, hyperglycemia, bilateral airspace opacities on CXR, and massive ICH with significant mass effect on head CT. Neurosurgery was called who did not think the patient injury was survivable. Patient now is admitted to ICU for further care until he is  transitioned to comfort care measures.     Neuro: ICH with early sign of herniation. Per neurosurgery, no salvageable. H/o polyneuropathy       Neuro consult for neuro-prognostication done, Input appreciated       Plan to perform apnea test in am after neurologist detail absence of brain stem reflexes.        [facility] was updated.     CV: hemodynamic instability likely due to increased ICP and volume loss 2/2 DI. Still requiring low dose of Levophed. Patient with h/o CHF, DLP, HTN.        Continue Levophed, wean off as tolerates       Received more boluses of IVF       D5,1/2NS at 100 ml/hour       Hold home BP medications.        DC home statins     Respiratory: acute hypoxic respiratory failure 2/2 pneumonia vs ARDS. Vent requirements overall moderate.          Continue current MV: 550/20/8/70%       Albuterol PRN         Renal: increased urine output due to DI. Cr WNL. H/o BPH, CKD II. Now with [facility]       Maintain BP with IVF and Levophed as above       Is/Os       Daily RFP       Treat electrolyte abnormalities as indicated    GI: h/o IBS with diarrhea, no acute issues        NPO        GI prophylaxis    Endo: no acute issues        Continue to monitor         Hypoglycemic protocol        SSI    Hem/Onc: no issues        Continue to monitor with daily CBC    ID: patient with possible aspiration pneumonia. Received Zosyn in the ED, Family wants to withdraw care. Per [facility] not a candidate for lung transplant. WBC overall stable. Fever likely due to ICH        Will monitor for onw    DVT prophylaxis: SDC, hold off chemical due to massive ICH.   This was a critical care visit for respiratory failure and ICH, total time 55 min.     René Romero MD

## 2025-04-23 NOTE — CARE PLAN
Problem: Skin  Goal: Decreased wound size/increased tissue granulation at next dressing change  Flowsheets (Taken 4/23/2025 0841)  Decreased wound size/increased tissue granulation at next dressing change:   Promote sleep for wound healing   Protective dressings over bony prominences   Utilize specialty bed per algorithm  Goal: Participates in plan/prevention/treatment measures  Flowsheets (Taken 4/23/2025 0841)  Participates in plan/prevention/treatment measures:   Discuss with provider PT/OT consult   Elevate heels   Increase activity/out of bed for meals  Goal: Prevent/manage excess moisture  Flowsheets (Taken 4/23/2025 0841)  Prevent/manage excess moisture:   Cleanse incontinence/protect with barrier cream   Monitor for/manage infection if present   Follow provider orders for dressing changes   Moisturize dry skin  Goal: Prevent/minimize sheer/friction injuries  Flowsheets (Taken 4/23/2025 0841)  Prevent/minimize sheer/friction injuries:   Complete micro-shifts as needed if patient unable. Adjust patient position to relieve pressure points, not a full turn   Increase activity/out of bed for meals   Use pull sheet   HOB 30 degrees or less   Turn/reposition every 2 hours/use positioning/transfer devices   Utilize specialty bed per algorithm  Goal: Promote/optimize nutrition  Flowsheets (Taken 4/23/2025 0841)  Promote/optimize nutrition:   Discuss with provider if NPO > 2 days   Monitor/record intake including meals  Goal: Promote skin healing  Flowsheets (Taken 4/23/2025 0841)  Promote skin healing:   Assess skin/pad under line(s)/device(s)   Protective dressings over bony prominences   Turn/reposition every 2 hours/use positioning/transfer devices   Ensure correct size (line/device) and apply per  instructions   Rotate device position/do not position patient on device   The patient's goals for the shift include      The clinical goals for the shift include hemodynamically stable

## 2025-04-24 NOTE — CARE PLAN
The patient's goals for the shift include      The clinical goals for the shift include pt will remain on 70% or less fio2 for this shift    Over the shift, the patient did not make progress toward the following goals. Recommend to continue to monitor vitals, telemetry, lab work, oxygenation, and fluid balance.

## 2025-04-24 NOTE — PROGRESS NOTES
"Subjective   At the time of my visit pt's family is having conversation with [facility].  Will revisit later     Objective   Neurological Exam  Physical Exam    Last Recorded Vitals  Blood pressure 94/67, pulse 99, temperature (!) 38.2 °C (100.8 °F), resp. rate 20, height 1.93 m (6' 4\"), weight (!) 172 kg (380 lb 1.2 oz), SpO2 92%.    Exam today is limited     Scheduled medications  Scheduled Medications[1]  Continuous medications  Continuous Medications[2]  PRN medications  PRN Medications[3]     Results for orders placed or performed during the hospital encounter of 04/21/25 (from the past 96 hours)   ECG 12 lead   Result Value Ref Range    Ventricular Rate 107 BPM    Atrial Rate 107 BPM    WA Interval 152 ms    QRS Duration 110 ms    QT Interval 378 ms    QTC Calculation(Bazett) 504 ms    P Axis 52 degrees    R Axis -88 degrees    T Axis 61 degrees    QRS Count 17 beats    Q Onset 205 ms    P Onset 129 ms    P Offset 193 ms    T Offset 394 ms    QTC Fredericia 458 ms   CBC and Auto Differential   Result Value Ref Range    WBC 18.3 (H) 4.4 - 11.3 x10*3/uL    nRBC 0.0 0.0 - 0.0 /100 WBCs    RBC 5.12 4.50 - 5.90 x10*6/uL    Hemoglobin 16.6 13.5 - 17.5 g/dL    Hematocrit 49.7 41.0 - 52.0 %    MCV 97 80 - 100 fL    MCH 32.4 26.0 - 34.0 pg    MCHC 33.4 32.0 - 36.0 g/dL    RDW 13.2 11.5 - 14.5 %    Platelets 209 150 - 450 x10*3/uL    Neutrophils % 79.1 40.0 - 80.0 %    Immature Granulocytes %, Automated 0.5 0.0 - 0.9 %    Lymphocytes % 12.4 13.0 - 44.0 %    Monocytes % 6.9 2.0 - 10.0 %    Eosinophils % 0.5 0.0 - 6.0 %    Basophils % 0.6 0.0 - 2.0 %    Neutrophils Absolute 14.50 (H) 1.20 - 7.70 x10*3/uL    Immature Granulocytes Absolute, Automated 0.10 0.00 - 0.70 x10*3/uL    Lymphocytes Absolute 2.28 1.20 - 4.80 x10*3/uL    Monocytes Absolute 1.26 (H) 0.10 - 1.00 x10*3/uL    Eosinophils Absolute 0.09 0.00 - 0.70 x10*3/uL    Basophils Absolute 0.11 (H) 0.00 - 0.10 x10*3/uL   Comprehensive Metabolic Panel   Result Value Ref " Range    Glucose 264 (H) 74 - 99 mg/dL    Sodium 134 (L) 136 - 145 mmol/L    Potassium 3.4 (L) 3.5 - 5.3 mmol/L    Chloride 98 98 - 107 mmol/L    Bicarbonate 24 21 - 32 mmol/L    Anion Gap 15 10 - 20 mmol/L    Urea Nitrogen 18 6 - 23 mg/dL    Creatinine 1.16 0.50 - 1.30 mg/dL    eGFR 74 >60 mL/min/1.73m*2    Calcium 8.9 8.6 - 10.3 mg/dL    Albumin 4.0 3.4 - 5.0 g/dL    Alkaline Phosphatase 78 33 - 120 U/L    Total Protein 7.0 6.4 - 8.2 g/dL    AST 29 9 - 39 U/L    Bilirubin, Total 0.5 0.0 - 1.2 mg/dL    ALT 38 10 - 52 U/L   Magnesium   Result Value Ref Range    Magnesium 1.86 1.60 - 2.40 mg/dL   Lipase   Result Value Ref Range    Lipase 47 9 - 82 U/L   Acute Toxicology Panel, Blood   Result Value Ref Range    Acetaminophen <10.0 10.0 - 30.0 ug/mL    Salicylate  <3 4 - 20 mg/dL    Alcohol <10 <=10 mg/dL   Troponin I, High Sensitivity, Initial   Result Value Ref Range    Troponin I, High Sensitivity 18 0 - 20 ng/L   Sars-CoV-2, Influenza A/B and RSV PCR   Result Value Ref Range    Coronavirus 2019, PCR Not Detected Not Detected    Flu A Result Not Detected Not Detected    Flu B Result Not Detected Not Detected    RSV PCR Not Detected Not Detected   BLOOD GAS ARTERIAL FULL PANEL   Result Value Ref Range    POCT pH, Arterial 7.26 (L) 7.38 - 7.42 pH    POCT pCO2, Arterial 58 (H) 38 - 42 mm Hg    POCT pO2, Arterial 80 (L) 85 - 95 mm Hg    POCT SO2, Arterial 95 94 - 100 %    POCT Oxy Hemoglobin, Arterial 91.4 (L) 94.0 - 98.0 %    POCT Hematocrit Calculated, Arterial 51.0 41.0 - 52.0 %    POCT Sodium, Arterial 133 (L) 136 - 145 mmol/L    POCT Potassium, Arterial 3.8 3.5 - 5.3 mmol/L    POCT Chloride, Arterial 101 98 - 107 mmol/L    POCT Ionized Calcium, Arterial 1.17 1.10 - 1.33 mmol/L    POCT Glucose, Arterial 214 (H) 74 - 99 mg/dL    POCT Lactate, Arterial 2.0 0.4 - 2.0 mmol/L    POCT Base Excess, Arterial -2.5 (L) -2.0 - 3.0 mmol/L    POCT HCO3 Calculated, Arterial 26.0 22.0 - 26.0 mmol/L    POCT Hemoglobin, Arterial  16.9 13.5 - 17.5 g/dL    POCT Anion Gap, Arterial 10 10 - 25 mmo/L    Patient Temperature 37.0 degrees Celsius    FiO2 100 %    Ventilator Mode A/C     Ventilator Rate 16 bpm    Tidal Volume 550 mL    Peep CHM2O 8.0 cm H2O    Site of Arterial Puncture Radial Left     Joey's Test Positive    POCT GLUCOSE   Result Value Ref Range    POCT Glucose 139 (H) 74 - 99 mg/dL   Drug Screen, Urine   Result Value Ref Range    Amphetamine Screen, Urine Presumptive Negative Presumptive Negative    Barbiturate Screen, Urine Presumptive Negative Presumptive Negative    Benzodiazepines Screen, Urine Presumptive Negative Presumptive Negative    Cannabinoid Screen, Urine Presumptive Negative Presumptive Negative    Cocaine Metabolite Screen, Urine Presumptive Negative Presumptive Negative    Fentanyl Screen, Urine Presumptive Negative Presumptive Negative    Opiate Screen, Urine Presumptive Negative Presumptive Negative    Oxycodone Screen, Urine Presumptive Negative Presumptive Negative    PCP Screen, Urine Presumptive Negative Presumptive Negative    Methadone Screen, Urine Presumptive Negative Presumptive Negative   Urinalysis with Reflex Culture and Microscopic   Result Value Ref Range    Color, Urine Yellow Light-Yellow, Yellow, Dark-Yellow    Appearance, Urine Ex.Turbid (N) Clear    Specific Gravity, Urine >1.050 (N) 1.005 - 1.035    pH, Urine 6.0 5.0, 5.5, 6.0, 6.5, 7.0, 7.5, 8.0    Protein, Urine 50 (1+) (A) NEGATIVE, 10 (TRACE), 20 (TRACE) mg/dL    Glucose, Urine Normal Normal mg/dL    Blood, Urine NEGATIVE NEGATIVE mg/dL    Ketones, Urine NEGATIVE NEGATIVE mg/dL    Bilirubin, Urine NEGATIVE NEGATIVE mg/dL    Urobilinogen, Urine Normal Normal mg/dL    Nitrite, Urine NEGATIVE NEGATIVE    Leukocyte Esterase, Urine NEGATIVE NEGATIVE   Extra Urine Gray Tube   Result Value Ref Range    Extra Tube Hold for add-ons.    Urinalysis Microscopic   Result Value Ref Range    WBC, Urine NONE 1-5, NONE /HPF    RBC, Urine NONE NONE, 1-2, 3-5  /HPF    Mucus, Urine FEW Reference range not established. /LPF    Amorphous Crystals, Urine 1+ NONE, 1+, 2+ /HPF   Troponin, High Sensitivity, 1 Hour   Result Value Ref Range    Troponin I, High Sensitivity 689 (HH) 0 - 20 ng/L   Amylase   Result Value Ref Range    Amylase 24 (L) 29 - 103 U/L   Lipase   Result Value Ref Range    Lipase 26 9 - 82 U/L   Type and screen   Result Value Ref Range    ABO TYPE O     Rh TYPE POS     ANTIBODY SCREEN NEG    Hemoglobin A1c   Result Value Ref Range    Hemoglobin A1C 5.0 See comment %    Estimated Average Glucose 97 Not Established mg/dL   VERIFY ABO/Rh Group Test   Result Value Ref Range    ABO TYPE O     Rh TYPE POS    POCT GLUCOSE   Result Value Ref Range    POCT Glucose 142 (H) 74 - 99 mg/dL   Amylase   Result Value Ref Range    Amylase 24 (L) 29 - 103 U/L   Lipase   Result Value Ref Range    Lipase 21 9 - 82 U/L   Hemoglobin and hematocrit, blood   Result Value Ref Range    Hemoglobin 14.9 13.5 - 17.5 g/dL    Hematocrit 44.8 41.0 - 52.0 %   Renal function panel   Result Value Ref Range    Glucose 129 (H) 74 - 99 mg/dL    Sodium 145 136 - 145 mmol/L    Potassium 3.1 (L) 3.5 - 5.3 mmol/L    Chloride 118 (H) 98 - 107 mmol/L    Bicarbonate 23 21 - 32 mmol/L    Anion Gap 7 (L) 10 - 20 mmol/L    Urea Nitrogen 14 6 - 23 mg/dL    Creatinine 0.75 0.50 - 1.30 mg/dL    eGFR >90 >60 mL/min/1.73m*2    Calcium 6.2 (L) 8.6 - 10.3 mg/dL    Phosphorus 2.8 2.5 - 4.9 mg/dL    Albumin 2.6 (L) 3.4 - 5.0 g/dL   Magnesium   Result Value Ref Range    Magnesium 1.32 (L) 1.60 - 2.40 mg/dL   POCT GLUCOSE   Result Value Ref Range    POCT Glucose 166 (H) 74 - 99 mg/dL   POCT GLUCOSE   Result Value Ref Range    POCT Glucose 178 (H) 74 - 99 mg/dL   Blood Gas Arterial Full Panel   Result Value Ref Range    POCT pH, Arterial 7.32 (L) 7.38 - 7.42 pH    POCT pCO2, Arterial 60 (H) 38 - 42 mm Hg    POCT pO2, Arterial 240 (H) 85 - 95 mm Hg    POCT SO2, Arterial 100 94 - 100 %    POCT Oxy Hemoglobin, Arterial  97.8 94.0 - 98.0 %    POCT Hematocrit Calculated, Arterial 47.0 41.0 - 52.0 %    POCT Sodium, Arterial 141 136 - 145 mmol/L    POCT Potassium, Arterial 4.7 3.5 - 5.3 mmol/L    POCT Chloride, Arterial 108 (H) 98 - 107 mmol/L    POCT Ionized Calcium, Arterial 1.25 1.10 - 1.33 mmol/L    POCT Glucose, Arterial 188 (H) 74 - 99 mg/dL    POCT Lactate, Arterial 1.4 0.4 - 2.0 mmol/L    POCT Base Excess, Arterial 2.9 -2.0 - 3.0 mmol/L    POCT HCO3 Calculated, Arterial 30.9 (H) 22.0 - 26.0 mmol/L    POCT Hemoglobin, Arterial 15.8 13.5 - 17.5 g/dL    POCT Anion Gap, Arterial 7 (L) 10 - 25 mmo/L    Patient Temperature 37.0 degrees Celsius    FiO2 100 %    Apparatus      Ventilator Mode A/C     Ventilator Rate 16 bpm    Tidal Volume 550 mL    Spontaneous Tidal Volume 558 mL    Total Minute Volume 8.9 Liter    Peep CHM2O 8.0 cm H2O    Site of Arterial Puncture Arterial Line     Joey's Test Negative    Basic metabolic panel   Result Value Ref Range    Glucose 177 (H) 74 - 99 mg/dL    Sodium 144 136 - 145 mmol/L    Potassium 4.5 3.5 - 5.3 mmol/L    Chloride 110 (H) 98 - 107 mmol/L    Bicarbonate 30 21 - 32 mmol/L    Anion Gap 9 (L) 10 - 20 mmol/L    Urea Nitrogen 16 6 - 23 mg/dL    Creatinine 1.04 0.50 - 1.30 mg/dL    eGFR 84 >60 mL/min/1.73m*2    Calcium 9.1 8.6 - 10.3 mg/dL   CBC   Result Value Ref Range    WBC 16.2 (H) 4.4 - 11.3 x10*3/uL    nRBC 0.0 0.0 - 0.0 /100 WBCs    RBC 4.87 4.50 - 5.90 x10*6/uL    Hemoglobin 15.6 13.5 - 17.5 g/dL    Hematocrit 46.8 41.0 - 52.0 %    MCV 96 80 - 100 fL    MCH 32.0 26.0 - 34.0 pg    MCHC 33.3 32.0 - 36.0 g/dL    RDW 13.4 11.5 - 14.5 %    Platelets 196 150 - 450 x10*3/uL   Calcium, ionized   Result Value Ref Range    POCT Calcium, Ionized 1.21 1.1 - 1.33 mmol/L   Phosphorus   Result Value Ref Range    Phosphorus 2.9 2.5 - 4.9 mg/dL   Magnesium   Result Value Ref Range    Magnesium 2.00 1.60 - 2.40 mg/dL   Lactate   Result Value Ref Range    Lactate 1.2 0.4 - 2.0 mmol/L   Hepatic function  panel   Result Value Ref Range    Albumin 3.6 3.4 - 5.0 g/dL    Bilirubin, Total 0.8 0.0 - 1.2 mg/dL    Bilirubin, Direct 0.2 0.0 - 0.3 mg/dL    Alkaline Phosphatase 63 33 - 120 U/L    ALT 27 10 - 52 U/L    AST 18 9 - 39 U/L    Total Protein 6.5 6.4 - 8.2 g/dL   Protime-INR   Result Value Ref Range    Protime 12.9 (H) 9.3 - 12.7 seconds    INR 1.2 0.9 - 1.2   APTT   Result Value Ref Range    aPTT 27.5 22.0 - 32.5 seconds   POCT GLUCOSE   Result Value Ref Range    POCT Glucose 173 (H) 74 - 99 mg/dL   POCT GLUCOSE   Result Value Ref Range    POCT Glucose 146 (H) 74 - 99 mg/dL   POCT GLUCOSE   Result Value Ref Range    POCT Glucose 135 (H) 74 - 99 mg/dL   Blood Gas Arterial Full Panel   Result Value Ref Range    POCT pH, Arterial 7.40 7.38 - 7.42 pH    POCT pCO2, Arterial 46 (H) 38 - 42 mm Hg    POCT pO2, Arterial 79 (L) 85 - 95 mm Hg    POCT SO2, Arterial 97 94 - 100 %    POCT Oxy Hemoglobin, Arterial 95.5 94.0 - 98.0 %    POCT Hematocrit Calculated, Arterial 45.0 41.0 - 52.0 %    POCT Sodium, Arterial 147 (H) 136 - 145 mmol/L    POCT Potassium, Arterial 4.2 3.5 - 5.3 mmol/L    POCT Chloride, Arterial 115 (H) 98 - 107 mmol/L    POCT Ionized Calcium, Arterial 1.27 1.10 - 1.33 mmol/L    POCT Glucose, Arterial 125 (H) 74 - 99 mg/dL    POCT Lactate, Arterial 1.8 0.4 - 2.0 mmol/L    POCT Base Excess, Arterial 2.9 -2.0 - 3.0 mmol/L    POCT HCO3 Calculated, Arterial 28.5 (H) 22.0 - 26.0 mmol/L    POCT Hemoglobin, Arterial 15.0 13.5 - 17.5 g/dL    POCT Anion Gap, Arterial 8 (L) 10 - 25 mmo/L    Patient Temperature 37.0 degrees Celsius    FiO2 60 %    Apparatus      Ventilator Mode      Ventilator Rate 20 bpm    Tidal Volume 550 mL    Peep CHM2O 8.0 cm H2O    Site of Arterial Puncture Arterial Line     Joey's Test     Basic metabolic panel   Result Value Ref Range    Glucose 133 (H) 74 - 99 mg/dL    Sodium 150 (H) 136 - 145 mmol/L    Potassium 4.0 3.5 - 5.3 mmol/L    Chloride 116 (H) 98 - 107 mmol/L    Bicarbonate 29 21 - 32  mmol/L    Anion Gap 9 (L) 10 - 20 mmol/L    Urea Nitrogen 17 6 - 23 mg/dL    Creatinine 1.31 (H) 0.50 - 1.30 mg/dL    eGFR 64 >60 mL/min/1.73m*2    Calcium 9.1 8.6 - 10.3 mg/dL   CBC   Result Value Ref Range    WBC 14.9 (H) 4.4 - 11.3 x10*3/uL    nRBC 0.0 0.0 - 0.0 /100 WBCs    RBC 4.34 (L) 4.50 - 5.90 x10*6/uL    Hemoglobin 14.2 13.5 - 17.5 g/dL    Hematocrit 43.7 41.0 - 52.0 %     (H) 80 - 100 fL    MCH 32.7 26.0 - 34.0 pg    MCHC 32.5 32.0 - 36.0 g/dL    RDW 13.6 11.5 - 14.5 %    Platelets 145 (L) 150 - 450 x10*3/uL   Calcium, ionized   Result Value Ref Range    POCT Calcium, Ionized 1.22 1.1 - 1.33 mmol/L   Phosphorus   Result Value Ref Range    Phosphorus 2.0 (L) 2.5 - 4.9 mg/dL   Magnesium   Result Value Ref Range    Magnesium 2.04 1.60 - 2.40 mg/dL   Lactate   Result Value Ref Range    Lactate 1.1 0.4 - 2.0 mmol/L   Hepatic function panel   Result Value Ref Range    Albumin 3.3 (L) 3.4 - 5.0 g/dL    Bilirubin, Total 1.0 0.0 - 1.2 mg/dL    Bilirubin, Direct 0.2 0.0 - 0.3 mg/dL    Alkaline Phosphatase 54 33 - 120 U/L    ALT 19 10 - 52 U/L    AST 12 9 - 39 U/L    Total Protein 6.0 (L) 6.4 - 8.2 g/dL   Protime-INR   Result Value Ref Range    Protime 11.9 9.3 - 12.7 seconds    INR 1.1 0.9 - 1.2   APTT   Result Value Ref Range    aPTT 25.6 22.0 - 32.5 seconds   Urinalysis with Reflex Culture and Microscopic   Result Value Ref Range    Color, Urine Light-Orange (N) Light-Yellow, Yellow, Dark-Yellow    Appearance, Urine Clear Clear    Specific Gravity, Urine 1.013 1.005 - 1.035    pH, Urine 6.0 5.0, 5.5, 6.0, 6.5, 7.0, 7.5, 8.0    Protein, Urine 20 (TRACE) NEGATIVE, 10 (TRACE), 20 (TRACE) mg/dL    Glucose, Urine Normal Normal mg/dL    Blood, Urine 0.2 (2+) (A) NEGATIVE mg/dL    Ketones, Urine NEGATIVE NEGATIVE mg/dL    Bilirubin, Urine NEGATIVE NEGATIVE mg/dL    Urobilinogen, Urine Normal Normal mg/dL    Nitrite, Urine NEGATIVE NEGATIVE    Leukocyte Esterase, Urine NEGATIVE NEGATIVE   Extra Urine Gray Tube    Result Value Ref Range    Extra Tube Hold for add-ons.    Urinalysis Microscopic   Result Value Ref Range    WBC, Urine 6-10 (A) 1-5, NONE /HPF    WBC Clumps, Urine OCCASIONAL Reference range not established. /HPF    RBC, Urine 11-20 (A) NONE, 1-2, 3-5 /HPF    Bacteria, Urine 1+ (A) NONE SEEN /HPF    Budding Yeast, Urine NONE NONE /HPF    Mucus, Urine FEW Reference range not established. /LPF    Hyaline Casts, Urine OCCASIONAL (A) NONE /LPF    Fine Granular Casts, Urine 1+ (A) NONE /LPF   POCT GLUCOSE   Result Value Ref Range    POCT Glucose 95 74 - 99 mg/dL   Amylase   Result Value Ref Range    Amylase 20 (L) 29 - 103 U/L   Lipase   Result Value Ref Range    Lipase 16 9 - 82 U/L   Basic metabolic panel   Result Value Ref Range    Glucose 134 (H) 74 - 99 mg/dL    Sodium 151 (H) 136 - 145 mmol/L    Potassium 3.9 3.5 - 5.3 mmol/L    Chloride 118 (H) 98 - 107 mmol/L    Bicarbonate 30 21 - 32 mmol/L    Anion Gap 7 (L) 10 - 20 mmol/L    Urea Nitrogen 20 6 - 23 mg/dL    Creatinine 1.52 (H) 0.50 - 1.30 mg/dL    eGFR 53 (L) >60 mL/min/1.73m*2    Calcium 8.8 8.6 - 10.3 mg/dL   POCT GLUCOSE   Result Value Ref Range    POCT Glucose 128 (H) 74 - 99 mg/dL   Blood Gas Arterial Full Panel   Result Value Ref Range    POCT pH, Arterial 7.33 (L) 7.38 - 7.42 pH    POCT pCO2, Arterial 58 (H) 38 - 42 mm Hg    POCT pO2, Arterial 77 (L) 85 - 95 mm Hg    POCT SO2, Arterial 97 94 - 100 %    POCT Oxy Hemoglobin, Arterial 93.4 (L) 94.0 - 98.0 %    POCT Hematocrit Calculated, Arterial 42.0 41.0 - 52.0 %    POCT Sodium, Arterial 147 (H) 136 - 145 mmol/L    POCT Potassium, Arterial 4.0 3.5 - 5.3 mmol/L    POCT Chloride, Arterial 117 (H) 98 - 107 mmol/L    POCT Ionized Calcium, Arterial 1.32 1.10 - 1.33 mmol/L    POCT Glucose, Arterial 145 (H) 74 - 99 mg/dL    POCT Lactate, Arterial 1.4 0.4 - 2.0 mmol/L    POCT Base Excess, Arterial 3.1 (H) -2.0 - 3.0 mmol/L    POCT HCO3 Calculated, Arterial 30.6 (H) 22.0 - 26.0 mmol/L    POCT Hemoglobin,  Arterial 13.9 13.5 - 17.5 g/dL    POCT Anion Gap, Arterial 3 (L) 10 - 25 mmo/L    Patient Temperature 37.0 degrees Celsius    FiO2 70 %    Apparatus      Ventilator Mode A/C     Ventilator Rate 20 bpm    Tidal Volume 550 mL    Peep CHM2O 8.0 cm H2O    Site of Arterial Puncture Arterial Line     Joey's Test Negative    POCT GLUCOSE   Result Value Ref Range    POCT Glucose 137 (H) 74 - 99 mg/dL   Basic metabolic panel   Result Value Ref Range    Glucose 140 (H) 74 - 99 mg/dL    Sodium 149 (H) 136 - 145 mmol/L    Potassium 4.0 3.5 - 5.3 mmol/L    Chloride 117 (H) 98 - 107 mmol/L    Bicarbonate 29 21 - 32 mmol/L    Anion Gap 7 (L) 10 - 20 mmol/L    Urea Nitrogen 22 6 - 23 mg/dL    Creatinine 1.79 (H) 0.50 - 1.30 mg/dL    eGFR 44 (L) >60 mL/min/1.73m*2    Calcium 8.8 8.6 - 10.3 mg/dL   CBC   Result Value Ref Range    WBC 16.2 (H) 4.4 - 11.3 x10*3/uL    nRBC 0.0 0.0 - 0.0 /100 WBCs    RBC 4.16 (L) 4.50 - 5.90 x10*6/uL    Hemoglobin 13.7 13.5 - 17.5 g/dL    Hematocrit 43.5 41.0 - 52.0 %     (H) 80 - 100 fL    MCH 32.9 26.0 - 34.0 pg    MCHC 31.5 (L) 32.0 - 36.0 g/dL    RDW 14.0 11.5 - 14.5 %    Platelets 105 (L) 150 - 450 x10*3/uL   Calcium, ionized   Result Value Ref Range    POCT Calcium, Ionized 1.23 1.1 - 1.33 mmol/L   Phosphorus   Result Value Ref Range    Phosphorus 2.7 2.5 - 4.9 mg/dL   Magnesium   Result Value Ref Range    Magnesium 1.84 1.60 - 2.40 mg/dL   Lactate   Result Value Ref Range    Lactate 0.9 0.4 - 2.0 mmol/L   Hepatic function panel   Result Value Ref Range    Albumin 3.1 (L) 3.4 - 5.0 g/dL    Bilirubin, Total 1.2 0.0 - 1.2 mg/dL    Bilirubin, Direct 0.3 0.0 - 0.3 mg/dL    Alkaline Phosphatase 53 33 - 120 U/L    ALT 17 10 - 52 U/L    AST 11 9 - 39 U/L    Total Protein 5.8 (L) 6.4 - 8.2 g/dL   Protime-INR   Result Value Ref Range    Protime 11.9 9.3 - 12.7 seconds    INR 1.1 0.9 - 1.2   APTT   Result Value Ref Range    aPTT 21.4 (L) 22.0 - 32.5 seconds   Renal function panel   Result Value Ref  Range    Glucose 141 (H) 74 - 99 mg/dL    Sodium 150 (H) 136 - 145 mmol/L    Potassium 4.0 3.5 - 5.3 mmol/L    Chloride 117 (H) 98 - 107 mmol/L    Bicarbonate 30 21 - 32 mmol/L    Anion Gap 7 (L) 10 - 20 mmol/L    Urea Nitrogen 22 6 - 23 mg/dL    Creatinine 1.79 (H) 0.50 - 1.30 mg/dL    eGFR 44 (L) >60 mL/min/1.73m*2    Calcium 8.8 8.6 - 10.3 mg/dL    Phosphorus 2.7 2.5 - 4.9 mg/dL    Albumin 3.1 (L) 3.4 - 5.0 g/dL   POCT GLUCOSE   Result Value Ref Range    POCT Glucose 149 (H) 74 - 99 mg/dL   POCT GLUCOSE   Result Value Ref Range    POCT Glucose 147 (H) 74 - 99 mg/dL          Imaging  EEG  Result Date: 4/23/2025  IMPRESSION Impression This 34 minute EEG, performed as per American Clinical Neurophysiology Society (ACNS) guidelines, shows electrocerebral activity, which is consistent with a diagnosis of electrocerebral death (in the absence of confounding factors such as sedation, hypot hermia or severe electrolyte disturbances). As per the updated AAN guidelines, EEG should not be used to assist with a diagnosis of brain death. A full report will be scanned into the patient's chart at a later time. This report has been interpreted and electronically signed by    XR chest 1 view  Result Date: 4/23/2025  Multifocal airspace opacities have mildly improved.   Signed by: Angi Duvall 4/23/2025 8:41 AM Dictation workstation:   [id number]    CT head wo IV contrast  Result Date: 4/23/2025    There is again evidence of a large amount of hyperdense intracranial hemorrhage.   Specifically, there are bihemispheric subdural hematomas left greater than right similar in size when compared with the prior study dated 04/21/2025. Specifically, using similar points of measurement on the current and prior study, the left hemispheric subdural hematoma again measures approximately 13 mm in thickness adjacent to the left frontal lobe and the right hemispheric subdural hematoma again measures proximally 6 mm in greatest thickness  adjacent to the right frontal lobe. There is similar mass effect upon the adjacent cerebral hemispheres left greater than right.   There is again evidence of hyperdense subarachnoid hemorrhage as well as hyperdense intraventricular hemorrhage similar when compared with the prior study.   A more focal area of hyperdense hemorrhage likely intraparenchymal in location is identified along the inferior left frontal lobe similar in size and configuration when compared with the prior study. There is surrounding hypodensity compatible with surrounding edema. There is similar associated mass effect.   There is again evidence of a proximally 16 mm of midline shift from left to right similar when compared with the prior exam.   There is again evidence of effacement of sulci as well as the suprasellar/basilar cisterns diffusely. There is partial effacement of the lateral ventricles left greater than right and 3rd ventricle. There is mild ventriculomegaly involving the non effaced portions of the right lateral ventricle as well as mild-to-moderate ventriculomegaly involving the hyperdense hemorrhagic filled 4th ventricle. There is mild nonspecific hypodensity surrounding the right lateral ventricle and 4th ventricle which while nonspecific may represent a component of transependymal edema.   MACRO: None.   Signed by: Blaine Beatty 4/23/2025 6:31 AM Dictation workstation:   [id number]    XR chest abdomen for OG NG placement  Result Date: 4/22/2025  Distal tip of the nasogastric tube in proximal stomach just distal to the gastric cardia.   Tip of the endotracheal tube 3 cm above radha.   Left perihilar infiltrate appears unchanged with improved right perihilar infiltrate.   Signed by: Dorina Pitts 4/22/2025 8:03 AM Dictation workstation:   [id number]    XR chest 1 view  Result Date: 4/22/2025  Support hardware as described above. Enteric tube extends to the distal esophagus with tip not well visualized. Consider repositioning  and repeat imaging.   There bilateral airspace opacities concerning for multifocal pneumonia as seen on same-day CT. Continued radiographic follow-up to resolution is advised.   MACRO: Randy Borrego discussed the significance and urgency of this critical finding by telephone with  RUSSELL CORNELL on 4/22/2025 at 1:32 am. (**-RCF-**) Findings:  See findings.   Signed by: Randy Borrego 4/22/2025 1:32 AM Dictation workstation:   [id number]    CT chest abdomen pelvis w IV contrast  Result Date: 4/21/2025    Extensive bilateral pneumonia or ARDS.   Satisfactory position of the endotracheal tube.   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   No separate acute detected abnormality although limited assessment related to quantum mottle artifact and beam hardening artifact.   Signed by: Ankur Mckeon 4/21/2025 7:18 PM Dictation workstation:   [id number]    CT head wo IV contrast  Addendum Date: 4/21/2025  Interpreted By:  Ankur Mckeon, ADDENDUM: The conclusion was inadvertently interrupted.   Head:   Massive amount of intracranial hemorrhage of indeterminate cause, including extra-axial, parenchymal, and intraventricular with severe mass effect including pronounced subfalcine herniation, unilateral descending transtentorial herniation and uncal herniation. Recommend urgent surgical consultation.   Cervical spine: No acute fracture or subluxation.   Degenerative changes.   Extensive bilateral pneumonia.   Ankur Mckeon discussed the significance and urgency of this critical finding by secure chat with  ERAN POLLARD on 4/21/2025 at 7:05 pm.  (**-RCF-**) Findings:  See findings.         Signed by: Ankur Mckeon 4/21/2025 7:06 PM   -------- ORIGINAL REPORT -------- Dictation workstation:   [id number]    Result Date: 4/21/2025  Massive amount of intracranial hemorrhage with severe mass effect including pronounced subfalcine herniation, unilateral descending transtentorial herniation and   MACRO:  None     Signed by: Ankur Mckeon 4/21/2025 7:00 PM Dictation workstation:   [id number]    CT cervical spine wo IV contrast  Addendum Date: 4/21/2025  Interpreted By:  Ankur Mckeon, ADDENDUM: The conclusion was inadvertently interrupted.   Head:   Massive amount of intracranial hemorrhage of indeterminate cause, including extra-axial, parenchymal, and intraventricular with severe mass effect including pronounced subfalcine herniation, unilateral descending transtentorial herniation and uncal herniation. Recommend urgent surgical consultation.   Cervical spine: No acute fracture or subluxation.   Degenerative changes.   Extensive bilateral pneumonia.   Ankur Mckeon discussed the significance and urgency of this critical finding by secure chat with  ERAN POLLARD on 4/21/2025 at 7:05 pm.  (**-RCF-**) Findings:  See findings.         Signed by: Ankur Mckeon 4/21/2025 7:06 PM   -------- ORIGINAL REPORT -------- Dictation workstation:   [id number]    Result Date: 4/21/2025  Massive amount of intracranial hemorrhage with severe mass effect including pronounced subfalcine herniation, unilateral descending transtentorial herniation and   MACRO: None     Signed by: Ankur Mckeon 4/21/2025 7:00 PM Dictation workstation:   [id number]    XR chest 1 view  Result Date: 4/21/2025    Intubation with the endotracheal tube 5 cm proximal to the radha.   Interval development of multifocal fairly extensive perihilar and upper lobe airspace opacities.   No evidence of pneumothorax.   Signed by: Nathen William 4/21/2025 6:13 PM Dictation workstation:   [id number]      Cardiology, Vascular, and Other Imaging  ECG 12 lead  Result Date: 4/22/2025  Sinus tachycardia Pulmonary disease pattern Left anterior fascicular block Nonspecific ST abnormality Prolonged QT Abnormal ECG When compared with ECG of 21-MAR-2025 13:44, Heart rate has increased by 40 bpm Confirmed by Dillon Smith ([id number]) on 4/22/2025 11:52:24 AM          Assessment/Plan   Assessment & Plan  Intracranial bleed (Multi)    55 yo M with severe brain injury 2/2 recent spontaneous ICH.  We are currently awaiting result of EEG at the time of my rounds and I will follow with exam if needed for organ harvest.        I personally spent 21 minutes today, exclusive of procedures, providing care for this patient, including preparation, face to face time, documentation and other services such as review of medical records, diagnostic result, patient education, counseling, coordination of care as specified in the encounter.        [1] insulin lispro, 0-5 Units, subcutaneous, q4h  oxygen, , inhalation, Continuous - Inhalation  pantoprazole, 40 mg, intravenous, Daily before breakfast  [2] dextrose 5%-0.45 % sodium chloride, 100 mL/hr, Last Rate: 100 mL/hr (04/23/25 2000)  norepinephrine, 0-1 mcg/kg/min, Last Rate: 0.28 mcg/kg/min (04/23/25 2214)  [3] PRN medications: acetaminophen, albuterol, dextrose, dextrose, glucagon, glucagon, hydrALAZINE, labetaloL, ondansetron, polyethylene glycol

## 2025-04-24 NOTE — SIGNIFICANT EVENT
[facility] CRITICAL CARE SIGNIFICANT EVENT NOTE:    Date:  4/24/2025  Patient:  Mu Escalante  YOB: 1970  MRN:  [id number]   Admit Date:  4/21/2025    Patient's Levophed infusion continued to increase throughout the night. According to  policy of running vasopressors peripherally, a central line would be warranted. Discussed with POA (Carlos Sarkarkins) and fiance (Malinda Weiss). Decided to decline CVC insertion at this time. Discussed risks and benefits. All questions answered.     Lucy Bell PA-C  [facility] ICU

## 2025-04-24 NOTE — PROGRESS NOTES
Mu Escalante is a 54 y.o. male on day 3 of admission presenting with Intracranial bleed (Multi).  Patient with h/o CHF, HTN, DLP, CKD, BPH, asthma, IBS with diarrhea, polyneuropathy, impaired fasting glucose, who presented to the ED after beng found unresponsive. He was intubated upon arrival to the ED. Work up revealed leukocytosis, hyperglycemia, bilateral airspace opacities on CXR, and massive ICH with significant mass effect on head CT. Neurosurgery was called who did not think the patient injury was survivable. Patient now is admitted to ICU for further care until he is transitioned to comfort care measures.   Subjective   Mild epistaxis overnight. Cr continues to increase. Urine output OK. Sodium remains relatively high. Also Levophed needs are increasing.      The patient is unresponsive and cannot provide history   Objective   Scheduled medications  insulin lispro, 0-5 Units, subcutaneous, q4h  oxygen, , inhalation, Continuous - Inhalation  pantoprazole, 40 mg, intravenous, Daily before breakfast    Continuous medications  dextrose 5%-0.45 % sodium chloride, 100 mL/hr, Last Rate: 100 mL/hr (04/24/25 0701)  norepinephrine, 0-1 mcg/kg/min, Last Rate: 0.33 mcg/kg/min (04/24/25 0842)    PRN medications  PRN medications: acetaminophen, albuterol, dextrose, dextrose, glucagon, glucagon, hydrALAZINE, labetaloL, ondansetron, polyethylene glycol   Physical Exam  Constitutional:       General: He is not in acute distress.     Appearance: He is obese.   HENT:      Head: Normocephalic and atraumatic.      Nose:      Comments: Mild epistaxis.      Mouth/Throat:      Comments: ET and G tube in place.  Eyes:      General: No scleral icterus.     Comments: [place]-orbital ecchymosis L eye, per family birth carmen. Pupils dilated and fixed.    Neck:      Vascular: No carotid bruit.   Cardiovascular:      Rate and Rhythm: Normal rate and regular rhythm.      Heart sounds: No murmur heard.     No friction rub. No gallop.  "  Pulmonary:      Effort: Pulmonary effort is normal. No respiratory distress.      Breath sounds: Rales (diffuse bilaterally) present. No wheezing.   Abdominal:      General: Bowel sounds are normal. There is no distension.      Palpations: Abdomen is soft.      Tenderness: There is no abdominal tenderness.   Musculoskeletal:         General: No tenderness.      Cervical back: Neck supple. No rigidity or tenderness.      Right lower leg: Edema (2+) present.      Left lower leg: Edema (2+) present.   Skin:     General: Skin is warm and dry.      Coloration: Skin is not jaundiced.      Findings: Bruising (L facial.) and erythema (Erythema/purple discoloration b/l LE) present.   Neurological:      Mental Status: He is alert.      Comments: Unresponsive, cannot assess   Psychiatric:      Comments: Cannot assess, unresponsive     Last Recorded Vitals  Blood pressure 94/67, pulse (!) 119, temperature (!) 38.2 °C (100.8 °F), resp. rate 26, height 1.93 m (6' 4\"), weight (!) 175 kg (385 lb 12.9 oz), SpO2 92%.  Intake/Output last 3 Shifts:  I/O last 3 completed shifts:  In: 8350.4 (47.7 mL/kg) [I.V.:6808.8 (38.9 mL/kg); IV Piggyback:1541.7]  Out: 3150 (18 mL/kg) [Urine:2850 (0.5 mL/kg/hr); Emesis/NG output:300]  Weight: 175.1 kg   Relevant Results  Results for orders placed or performed during the hospital encounter of 04/21/25 (from the past 24 hours)   Blood Gas Arterial Full Panel   Result Value Ref Range    POCT pH, Arterial 7.33 (L) 7.38 - 7.42 pH    POCT pCO2, Arterial 58 (H) 38 - 42 mm Hg    POCT pO2, Arterial 77 (L) 85 - 95 mm Hg    POCT SO2, Arterial 97 94 - 100 %    POCT Oxy Hemoglobin, Arterial 93.4 (L) 94.0 - 98.0 %    POCT Hematocrit Calculated, Arterial 42.0 41.0 - 52.0 %    POCT Sodium, Arterial 147 (H) 136 - 145 mmol/L    POCT Potassium, Arterial 4.0 3.5 - 5.3 mmol/L    POCT Chloride, Arterial 117 (H) 98 - 107 mmol/L    POCT Ionized Calcium, Arterial 1.32 1.10 - 1.33 mmol/L    POCT Glucose, Arterial 145 (H) " 74 - 99 mg/dL    POCT Lactate, Arterial 1.4 0.4 - 2.0 mmol/L    POCT Base Excess, Arterial 3.1 (H) -2.0 - 3.0 mmol/L    POCT HCO3 Calculated, Arterial 30.6 (H) 22.0 - 26.0 mmol/L    POCT Hemoglobin, Arterial 13.9 13.5 - 17.5 g/dL    POCT Anion Gap, Arterial 3 (L) 10 - 25 mmo/L    Patient Temperature 37.0 degrees Celsius    FiO2 70 %    Apparatus      Ventilator Mode A/C     Ventilator Rate 20 bpm    Tidal Volume 550 mL    Peep CHM2O 8.0 cm H2O    Site of Arterial Puncture Arterial Line     Joey's Test Negative    POCT GLUCOSE   Result Value Ref Range    POCT Glucose 137 (H) 74 - 99 mg/dL   Basic metabolic panel   Result Value Ref Range    Glucose 140 (H) 74 - 99 mg/dL    Sodium 149 (H) 136 - 145 mmol/L    Potassium 4.0 3.5 - 5.3 mmol/L    Chloride 117 (H) 98 - 107 mmol/L    Bicarbonate 29 21 - 32 mmol/L    Anion Gap 7 (L) 10 - 20 mmol/L    Urea Nitrogen 22 6 - 23 mg/dL    Creatinine 1.79 (H) 0.50 - 1.30 mg/dL    eGFR 44 (L) >60 mL/min/1.73m*2    Calcium 8.8 8.6 - 10.3 mg/dL   CBC   Result Value Ref Range    WBC 16.2 (H) 4.4 - 11.3 x10*3/uL    nRBC 0.0 0.0 - 0.0 /100 WBCs    RBC 4.16 (L) 4.50 - 5.90 x10*6/uL    Hemoglobin 13.7 13.5 - 17.5 g/dL    Hematocrit 43.5 41.0 - 52.0 %     (H) 80 - 100 fL    MCH 32.9 26.0 - 34.0 pg    MCHC 31.5 (L) 32.0 - 36.0 g/dL    RDW 14.0 11.5 - 14.5 %    Platelets 105 (L) 150 - 450 x10*3/uL   Calcium, ionized   Result Value Ref Range    POCT Calcium, Ionized 1.23 1.1 - 1.33 mmol/L   Phosphorus   Result Value Ref Range    Phosphorus 2.7 2.5 - 4.9 mg/dL   Magnesium   Result Value Ref Range    Magnesium 1.84 1.60 - 2.40 mg/dL   Lactate   Result Value Ref Range    Lactate 0.9 0.4 - 2.0 mmol/L   Hepatic function panel   Result Value Ref Range    Albumin 3.1 (L) 3.4 - 5.0 g/dL    Bilirubin, Total 1.2 0.0 - 1.2 mg/dL    Bilirubin, Direct 0.3 0.0 - 0.3 mg/dL    Alkaline Phosphatase 53 33 - 120 U/L    ALT 17 10 - 52 U/L    AST 11 9 - 39 U/L    Total Protein 5.8 (L) 6.4 - 8.2 g/dL    Protime-INR   Result Value Ref Range    Protime 11.9 9.3 - 12.7 seconds    INR 1.1 0.9 - 1.2   APTT   Result Value Ref Range    aPTT 21.4 (L) 22.0 - 32.5 seconds   Renal function panel   Result Value Ref Range    Glucose 141 (H) 74 - 99 mg/dL    Sodium 150 (H) 136 - 145 mmol/L    Potassium 4.0 3.5 - 5.3 mmol/L    Chloride 117 (H) 98 - 107 mmol/L    Bicarbonate 30 21 - 32 mmol/L    Anion Gap 7 (L) 10 - 20 mmol/L    Urea Nitrogen 22 6 - 23 mg/dL    Creatinine 1.79 (H) 0.50 - 1.30 mg/dL    eGFR 44 (L) >60 mL/min/1.73m*2    Calcium 8.8 8.6 - 10.3 mg/dL    Phosphorus 2.7 2.5 - 4.9 mg/dL    Albumin 3.1 (L) 3.4 - 5.0 g/dL   POCT GLUCOSE   Result Value Ref Range    POCT Glucose 149 (H) 74 - 99 mg/dL   POCT GLUCOSE   Result Value Ref Range    POCT Glucose 147 (H) 74 - 99 mg/dL   Basic metabolic panel   Result Value Ref Range    Glucose 149 (H) 74 - 99 mg/dL    Sodium 150 (H) 136 - 145 mmol/L    Potassium 4.0 3.5 - 5.3 mmol/L    Chloride 117 (H) 98 - 107 mmol/L    Bicarbonate 29 21 - 32 mmol/L    Anion Gap 8 (L) 10 - 20 mmol/L    Urea Nitrogen 23 6 - 23 mg/dL    Creatinine 2.09 (H) 0.50 - 1.30 mg/dL    eGFR 36 (L) >60 mL/min/1.73m*2    Calcium 8.8 8.6 - 10.3 mg/dL   CBC   Result Value Ref Range    WBC 19.7 (H) 4.4 - 11.3 x10*3/uL    nRBC 0.0 0.0 - 0.0 /100 WBCs    RBC 3.99 (L) 4.50 - 5.90 x10*6/uL    Hemoglobin 13.2 (L) 13.5 - 17.5 g/dL    Hematocrit 42.3 41.0 - 52.0 %     (H) 80 - 100 fL    MCH 33.1 26.0 - 34.0 pg    MCHC 31.2 (L) 32.0 - 36.0 g/dL    RDW 14.3 11.5 - 14.5 %    Platelets 100 (L) 150 - 450 x10*3/uL   Calcium, ionized   Result Value Ref Range    POCT Calcium, Ionized 1.24 1.1 - 1.33 mmol/L   Phosphorus   Result Value Ref Range    Phosphorus 2.8 2.5 - 4.9 mg/dL   Magnesium   Result Value Ref Range    Magnesium 1.74 1.60 - 2.40 mg/dL   Lactate   Result Value Ref Range    Lactate 1.2 0.4 - 2.0 mmol/L   Hepatic function panel   Result Value Ref Range    Albumin 2.9 (L) 3.4 - 5.0 g/dL    Bilirubin, Total 1.6  (H) 0.0 - 1.2 mg/dL    Bilirubin, Direct 0.6 (H) 0.0 - 0.3 mg/dL    Alkaline Phosphatase 57 33 - 120 U/L    ALT 15 10 - 52 U/L    AST 15 9 - 39 U/L    Total Protein 5.7 (L) 6.4 - 8.2 g/dL   Protime-INR   Result Value Ref Range    Protime 11.9 9.3 - 12.7 seconds    INR 1.1 0.9 - 1.2   APTT   Result Value Ref Range    aPTT 23.8 22.0 - 32.5 seconds   POCT GLUCOSE   Result Value Ref Range    POCT Glucose 147 (H) 74 - 99 mg/dL   Blood Gas Arterial Full Panel   Result Value Ref Range    POCT pH, Arterial 7.29 (L) 7.38 - 7.42 pH    POCT pCO2, Arterial 64 (H) 38 - 42 mm Hg    POCT pO2, Arterial 79 (L) 85 - 95 mm Hg    POCT SO2, Arterial 98 94 - 100 %    POCT Oxy Hemoglobin, Arterial 94.3 94.0 - 98.0 %    POCT Hematocrit Calculated, Arterial 41.0 41.0 - 52.0 %    POCT Sodium, Arterial 147 (H) 136 - 145 mmol/L    POCT Potassium, Arterial 4.2 3.5 - 5.3 mmol/L    POCT Chloride, Arterial 117 (H) 98 - 107 mmol/L    POCT Ionized Calcium, Arterial 1.34 (H) 1.10 - 1.33 mmol/L    POCT Glucose, Arterial 158 (H) 74 - 99 mg/dL    POCT Lactate, Arterial 1.3 0.4 - 2.0 mmol/L    POCT Base Excess, Arterial 2.5 -2.0 - 3.0 mmol/L    POCT HCO3 Calculated, Arterial 30.8 (H) 22.0 - 26.0 mmol/L    POCT Hemoglobin, Arterial 13.5 13.5 - 17.5 g/dL    POCT Anion Gap, Arterial 3 (L) 10 - 25 mmo/L    Patient Temperature 37.0 degrees Celsius    FiO2 70 %    Apparatus      Ventilator Mode      Ventilator Rate 20 bpm    Tidal Volume 550 mL    Peep CHM2O 8.0 cm H2O    Site of Arterial Puncture Arterial Line    POCT GLUCOSE   Result Value Ref Range    POCT Glucose 152 (H) 74 - 99 mg/dL   Blood Gas Arterial Full Panel   Result Value Ref Range    POCT pH, Arterial 7.33 (L) 7.38 - 7.42 pH    POCT pCO2, Arterial 57 (H) 38 - 42 mm Hg    POCT pO2, Arterial 78 (L) 85 - 95 mm Hg    POCT SO2, Arterial 99 94 - 100 %    POCT Oxy Hemoglobin, Arterial 95.3 94.0 - 98.0 %    POCT Hematocrit Calculated, Arterial 41.0 41.0 - 52.0 %    POCT Sodium, Arterial 147 (H) 136 - 145  mmol/L    POCT Potassium, Arterial 4.1 3.5 - 5.3 mmol/L    POCT Chloride, Arterial 118 (H) 98 - 107 mmol/L    POCT Ionized Calcium, Arterial 1.35 (H) 1.10 - 1.33 mmol/L    POCT Glucose, Arterial 164 (H) 74 - 99 mg/dL    POCT Lactate, Arterial 1.4 0.4 - 2.0 mmol/L    POCT Base Excess, Arterial 2.8 -2.0 - 3.0 mmol/L    POCT HCO3 Calculated, Arterial 30.1 (H) 22.0 - 26.0 mmol/L    POCT Hemoglobin, Arterial 13.6 13.5 - 17.5 g/dL    POCT Anion Gap, Arterial 3 (L) 10 - 25 mmo/L    Patient Temperature 37.0 degrees Celsius    FiO2 70 %    Apparatus      Ventilator Mode      Ventilator Rate 24 bpm    Tidal Volume 550 mL    Peep CHM2O 8.0 cm H2O    Site of Arterial Puncture Arterial Line    Amylase   Result Value Ref Range    Amylase 25 (L) 29 - 103 U/L   Lipase   Result Value Ref Range    Lipase 13 9 - 82 U/L   Urinalysis with Reflex Culture and Microscopic   Result Value Ref Range    Color, Urine Light-Orange (N) Light-Yellow, Yellow, Dark-Yellow    Appearance, Urine Turbid (N) Clear    Specific Gravity, Urine 1.005 1.005 - 1.035    pH, Urine 5.5 5.0, 5.5, 6.0, 6.5, 7.0, 7.5, 8.0    Protein, Urine 20 (TRACE) NEGATIVE, 10 (TRACE), 20 (TRACE) mg/dL    Glucose, Urine Normal Normal mg/dL    Blood, Urine 0.2 (2+) (A) NEGATIVE mg/dL    Ketones, Urine NEGATIVE NEGATIVE mg/dL    Bilirubin, Urine NEGATIVE NEGATIVE mg/dL    Urobilinogen, Urine Normal Normal mg/dL    Nitrite, Urine NEGATIVE NEGATIVE    Leukocyte Esterase, Urine NEGATIVE NEGATIVE   Urinalysis Microscopic   Result Value Ref Range    WBC, Urine 1-5 1-5, NONE /HPF    RBC, Urine 1-2 NONE, 1-2, 3-5 /HPF    Bacteria, Urine 2+ (A) NONE SEEN /HPF    Mucus, Urine FEW Reference range not established. /LPF   POCT GLUCOSE   Result Value Ref Range    POCT Glucose 152 (H) 74 - 99 mg/dL   Blood Gas Arterial Full Panel   Result Value Ref Range    POCT pH, Arterial 7.35 (L) 7.38 - 7.42 pH    POCT pCO2, Arterial 54 (H) 38 - 42 mm Hg    POCT pO2, Arterial 80 (L) 85 - 95 mm Hg    POCT  SO2, Arterial 98 94 - 100 %    POCT Oxy Hemoglobin, Arterial 95.6 94.0 - 98.0 %    POCT Hematocrit Calculated, Arterial 41.0 41.0 - 52.0 %    POCT Sodium, Arterial 148 (H) 136 - 145 mmol/L    POCT Potassium, Arterial 4.1 3.5 - 5.3 mmol/L    POCT Chloride, Arterial 117 (H) 98 - 107 mmol/L    POCT Ionized Calcium, Arterial 1.35 (H) 1.10 - 1.33 mmol/L    POCT Glucose, Arterial 164 (H) 74 - 99 mg/dL    POCT Lactate, Arterial 1.6 0.4 - 2.0 mmol/L    POCT Base Excess, Arterial 3.0 -2.0 - 3.0 mmol/L    POCT HCO3 Calculated, Arterial 29.8 (H) 22.0 - 26.0 mmol/L    POCT Hemoglobin, Arterial 13.5 13.5 - 17.5 g/dL    POCT Anion Gap, Arterial 5 (L) 10 - 25 mmo/L    Patient Temperature 37.0 degrees Celsius    FiO2 70 %    Ventilator Mode Other     Ventilator Rate 26 bpm    Tidal Volume 550 mL    Peep CHM2O 8.0 cm H2O    Site of Arterial Puncture Arterial Line     Joey's Test Negative    EEG  Result Date: 4/23/2025  IMPRESSION Impression This 34 minute EEG, performed as per American Clinical Neurophysiology Society (ACNS) guidelines, shows electrocerebral activity, which is consistent with a diagnosis of electrocerebral death (in the absence of confounding factors such as sedation, hypot hermia or severe electrolyte disturbances). As per the updated AAN guidelines, EEG should not be used to assist with a diagnosis of brain death. A full report will be scanned into the patient's chart at a later time. This report has been interpreted and electronically signed by    XR chest 1 view  Result Date: 4/23/2025  Interpreted By:  Angi Duvall, STUDY: XR CHEST 1 VIEW;  4/23/2025 6:42 am   INDICATION: Signs/Symptoms:hypoxia.   COMPARISON: 04/22/2025   ACCESSION NUMBER(S): [id number]   ORDERING CLINICIAN: KLARISSA ESQUIVEL   FINDINGS: AP radiograph of the chest was provided.   The endotracheal tube distal tip projects 6.2 cm above the radha. The enteric tube distal tip projects below the field of view.   CARDIOMEDIASTINAL SILHOUETTE:  Cardiomediastinal silhouette is normal in size and configuration.   LUNGS: Multifocal airspace opacities have mildly improved. No pleural effusion or pneumothorax.   ABDOMEN: No remarkable upper abdominal findings.   BONES: Chronic right-sided rib fractures noted.       Multifocal airspace opacities have mildly improved.   Signed by: Angi Duvall 4/23/2025 8:41 AM Dictation workstation:   [id number]    CT head wo IV contrast  Result Date: 4/23/2025  Interpreted By:  Blaine Beatty, STUDY: CT HEAD WO IV CONTRAST;  4/22/2025 5:14 pm   INDICATION: Signs/Symptoms:ICH.   COMPARISON: 04/21/2025   ACCESSION NUMBER(S): [id number]   ORDERING CLINICIAN: YOLANDE DECKER   TECHNIQUE: Axial CT images of the head were obtained without intravenous contrast administration.   FINDINGS: There is again evidence of a large amount of hyperdense intracranial hemorrhage.   Specifically, there are bihemispheric subdural hematomas left greater than right similar in size when compared with the prior study dated 04/21/2025. Specifically, using similar points of measurement on the current and prior study, the left hemispheric subdural hematoma again measures approximately 13 mm in thickness adjacent to the left frontal lobe and the right hemispheric subdural hematoma again measures proximally 6 mm in greatest thickness adjacent to the right frontal lobe. There is similar mass effect upon the adjacent cerebral hemispheres left greater than right.   There is again evidence of hyperdense subarachnoid hemorrhage as well as hyperdense intraventricular hemorrhage similar when compared with the prior study.   A more focal area of hyperdense hemorrhage likely intraparenchymal in location is identified along the inferior left frontal lobe similar in size and configuration when compared with the prior study. There is surrounding hypodensity compatible with surrounding edema. There is similar associated mass effect.   There is again evidence of a  proximally 16 mm of midline shift from left to right similar when compared with the prior exam.   There is again evidence of effacement of sulci as well as the suprasellar/basilar cisterns diffusely. There is partial effacement of the lateral ventricles left greater than right and 3rd ventricle. There is mild ventriculomegaly involving the non effaced portions of the right lateral ventricle as well as mild-to-moderate ventriculomegaly involving the hyperdense hemorrhagic filled 4th ventricle. There is mild nonspecific hypodensity surrounding the right lateral ventricle and 4th ventricle which while nonspecific may represent a component of transependymal edema.   There is a stable 7 mm focus of dystrophic calcification within the right frontoparietal region     There are secretions and mucosal thickening contributing to partial opacification left sphenoid sinus. Mucosal thickening is noted within the right sphenoid sinus and to a lesser degree partially imaged maxillary sinuses. There is opacification and partial opacification of scattered ethmoid air cells.   There is opacification/partial opacification of scattered mastoid air cells bilaterally.         There is again evidence of a large amount of hyperdense intracranial hemorrhage.   Specifically, there are bihemispheric subdural hematomas left greater than right similar in size when compared with the prior study dated 04/21/2025. Specifically, using similar points of measurement on the current and prior study, the left hemispheric subdural hematoma again measures approximately 13 mm in thickness adjacent to the left frontal lobe and the right hemispheric subdural hematoma again measures proximally 6 mm in greatest thickness adjacent to the right frontal lobe. There is similar mass effect upon the adjacent cerebral hemispheres left greater than right.   There is again evidence of hyperdense subarachnoid hemorrhage as well as hyperdense intraventricular hemorrhage  similar when compared with the prior study.   A more focal area of hyperdense hemorrhage likely intraparenchymal in location is identified along the inferior left frontal lobe similar in size and configuration when compared with the prior study. There is surrounding hypodensity compatible with surrounding edema. There is similar associated mass effect.   There is again evidence of a proximally 16 mm of midline shift from left to right similar when compared with the prior exam.   There is again evidence of effacement of sulci as well as the suprasellar/basilar cisterns diffusely. There is partial effacement of the lateral ventricles left greater than right and 3rd ventricle. There is mild ventriculomegaly involving the non effaced portions of the right lateral ventricle as well as mild-to-moderate ventriculomegaly involving the hyperdense hemorrhagic filled 4th ventricle. There is mild nonspecific hypodensity surrounding the right lateral ventricle and 4th ventricle which while nonspecific may represent a component of transependymal edema.   MACRO: None.   Signed by: Blaine Beatty 4/23/2025 6:31 AM Dictation workstation:   [id number]    Assessment & Plan  Intracranial bleed (Multi)    54 YOM with h/o CHF, HTN, DLP, CKD, BPH, asthma, IBS with diarrhea, polyneuropathy, impaired fasting glucose, who presented to the ED after beng found unresponsive. He was intubated upon arrival to the ED. Work up revealed leukocytosis, hyperglycemia, bilateral airspace opacities on CXR, and massive ICH with significant mass effect on head CT. Neurosurgery was called who did not think the patient injury was survivable. Patient now is admitted to ICU for further care until he is transitioned to comfort care measures.     Neuro: ICH with early sign of herniation. Per neurosurgery, no salvageable. H/o polyneuropathy       Neuro consult for neuro-prognostication done, Input appreciated       Plan to perform apnea test this am after ABG  appropriate for the test.       [facility] was updated.     CV: hemodynamic instability likely due to increased ICP and volume loss 2/2 DI. Still requiring low dose of Levophed. Patient with h/o CHF, DLP, HTN.        Continue Levophed, wean off as tolerates       Will stop the IVF for now       Hold home BP medications.        DCed home statins     Respiratory: acute hypoxic respiratory failure 2/2 pneumonia vs ARDS. Vent requirements overall moderate.          Continue MV setting changed to  630/26/8/70%       Will repeat ABG to achieve PH 7.35-45 and PCO 35-45       Albuterol PRN         Renal: increased urine output due to DI. Cr WNL. H/o BPH, CKD II. Now with [place]       Maintain BP with ILevophed as above       Is/Os       Daily RFP       Treat electrolyte abnormalities as indicated    GI: h/o IBS with diarrhea, no acute issues        NPO        GI prophylaxis    Endo: no acute issues        Continue to monitor         Hypoglycemic protocol        SSI    Hem/Onc: no issues        Continue to monitor with daily CBC    ID: patient with possible aspiration pneumonia. Received Zosyn in the ED, Family wants to withdraw care. Per [facility] not a candidate for lung transplant. WBC slightly higher today. Fever likely due to ICH        Will monitor for onw    DVT prophylaxis: SDC, hold off chemical due to massive ICH.   This was a critical care visit for respiratory failure and ICH, total time 50 min.     René Romero MD

## 2025-04-24 NOTE — PROGRESS NOTES
Spiritual Care Visit  Spiritual Care Request    Reason for Visit:  Routine Visit: Introduction     Request Received From:       Focus of Care:  Visited With: Patient, Family         Refer to :          Spiritual Care Assessment    Spiritual Assessment:                      Care Provided:  Intended Effects: Establish rapport and connectedness, Build relationship of care and support, Convey a calming presence, Demonstrate caring and concern  Methods: Offer emotional support  Interventions: Explain  role    Sense of Community and or Nondenominational Affiliation:  None         Addressed Needs/Concerns and/or Rosalina Through:          Outcome:  Outcome of Spiritual Care Visit: Identifying spiritual/emotional issues, Comfort/healing presence     Advance Directives:         Spiritual Care Annotation      Annotation:  provided patient support while rounding the Unit.  introduced  services of  [facility].  explained the role of the  in providing emotional and spiritual support for patient's and family while in admitted to the hospital.     Patient was intubated and non verbal. Patient's significant other was at bedside.  offered support.  Malinda indicated that she was doing ok at this time.  offered review of how to obtain spiritual care serves.     No spiritual or Scientologist needs were expressed. Spiritual care will remain available for support as requested.

## 2025-04-24 NOTE — PROGRESS NOTES
"Pt seen and examined for brain death exam. Pt has not had exposure to any sedating/ paralytics medications and for the duration of the exam, the pt was normotensive, normothermic, without significant metbaolic derangements. On exam, pupils nonreactive, no corneal reflex, no cough or gag reflexes, no appropriate response to painful stimulus, no vestibulo-ocular reflexes, no response to cold calorics.        Last Recorded Vitals  Blood pressure 94/67, pulse (!) 119, temperature (!) 38.2 °C (100.8 °F), resp. rate 26, height 1.93 m (6' 4\"), weight (!) 175 kg (385 lb 12.9 oz), SpO2 92%.          Clinical examination finds him unresponsive with no clinical evidence of cortical function. He also has no clinical evidence of brainstem function including pupils fixed and dilated, absent corneal reflex. No eye movements to Doll's eyes maneuver, no eye movements to cold water  irrigation into ears, no cough/gag, no spontaneous respiration over ventilator rate. Motor exam shows no movement in UEs to noxious stimulation.      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 There are no confounding variables that could be affecting his clinical examination. His examination is consistent with brain death (death by neurological criteria).      Please see Dr. Garces's note for apnea test results in support of this interpretation.    "

## 2025-04-24 NOTE — CARE PLAN
The patient's goals for the shift include  ERIC    The clinical goals for the shift include maintain hemodynamic stability      Problem: Fall/Injury  Goal: Not fall by end of shift  4/23/2025 2250 by Jane Webb RN  Outcome: Progressing  4/23/2025 2250 by Jane Webb RN  Outcome: Progressing  Goal: Be free from injury by end of the shift  4/23/2025 2250 by Jane Webb RN  Outcome: Progressing  4/23/2025 2250 by Jane Webb RN  Outcome: Progressing  Goal: Verbalize understanding of personal risk factors for fall in the hospital  4/23/2025 2250 by Jane Webb RN  Outcome: Progressing  4/23/2025 2250 by Jane Webb RN  Outcome: Progressing  Goal: Verbalize understanding of risk factor reduction measures to prevent injury from fall in the home  4/23/2025 2250 by Jane Webb RN  Outcome: Progressing  4/23/2025 2250 by Jane Webb RN  Outcome: Progressing  Goal: Use assistive devices by end of the shift  4/23/2025 2250 by Jane Webb RN  Outcome: Progressing  4/23/2025 2250 by Jane Webb RN  Outcome: Progressing  Goal: Pace activities to prevent fatigue by end of the shift  4/23/2025 2250 by Jane Webb RN  Outcome: Progressing  4/23/2025 2250 by Jane Webb RN  Outcome: Progressing     Problem: Skin  Goal: Decreased wound size/increased tissue granulation at next dressing change  4/23/2025 2250 by Jane Webb RN  Outcome: Progressing  Flowsheets (Taken 4/23/2025 2250)  Decreased wound size/increased tissue granulation at next dressing change:   Promote sleep for wound healing   Protective dressings over bony prominences   Utilize specialty bed per algorithm  4/23/2025 2250 by Jane Webb RN  Outcome: Progressing  Flowsheets (Taken 4/23/2025 2250)  Decreased wound size/increased tissue granulation at next dressing change:   Promote sleep for wound healing   Protective dressings over bony prominences   Utilize specialty bed per algorithm  Goal: Participates in  plan/prevention/treatment measures  4/23/2025 2250 by Jane Webb RN  Outcome: Progressing  Flowsheets (Taken 4/23/2025 2250)  Participates in plan/prevention/treatment measures:   Discuss with provider PT/OT consult   Elevate heels  4/23/2025 2250 by Jane Webb RN  Outcome: Progressing  Flowsheets (Taken 4/23/2025 2250)  Participates in plan/prevention/treatment measures:   Discuss with provider PT/OT consult   Elevate heels  Goal: Prevent/manage excess moisture  4/23/2025 2250 by Jane Webb RN  Outcome: Progressing  Flowsheets (Taken 4/23/2025 2250)  Prevent/manage excess moisture:   Cleanse incontinence/protect with barrier cream   Moisturize dry skin   Follow provider orders for dressing changes   Monitor for/manage infection if present  4/23/2025 2250 by Jane Webb RN  Outcome: Progressing  Flowsheets (Taken 4/23/2025 2250)  Prevent/manage excess moisture:   Cleanse incontinence/protect with barrier cream   Moisturize dry skin   Follow provider orders for dressing changes   Monitor for/manage infection if present  Goal: Prevent/minimize sheer/friction injuries  4/23/2025 2250 by Jane Webb RN  Outcome: Progressing  Flowsheets (Taken 4/23/2025 2250)  Prevent/minimize sheer/friction injuries:   Complete micro-shifts as needed if patient unable. Adjust patient position to relieve pressure points, not a full turn   Increase activity/out of bed for meals   Use pull sheet   HOB 30 degrees or less   Turn/reposition every 2 hours/use positioning/transfer devices   Utilize specialty bed per algorithm  4/23/2025 2250 by Jane Webb RN  Outcome: Progressing  Flowsheets (Taken 4/23/2025 2250)  Prevent/minimize sheer/friction injuries:   Complete micro-shifts as needed if patient unable. Adjust patient position to relieve pressure points, not a full turn   Increase activity/out of bed for meals   Use pull sheet   HOB 30 degrees or less   Turn/reposition every 2 hours/use positioning/transfer devices    Utilize specialty bed per algorithm  Goal: Promote/optimize nutrition  4/23/2025 2250 by Jane Webb RN  Outcome: Progressing  Flowsheets (Taken 4/23/2025 2250)  Promote/optimize nutrition: Monitor/record intake including meals  4/23/2025 2250 by Jane Webb RN  Outcome: Progressing  Flowsheets (Taken 4/23/2025 2250)  Promote/optimize nutrition: Monitor/record intake including meals  Goal: Promote skin healing  4/23/2025 2250 by Jane Webb RN  Outcome: Progressing  Flowsheets (Taken 4/23/2025 2250)  Promote skin healing:   Assess skin/pad under line(s)/device(s)   Protective dressings over bony prominences   Turn/reposition every 2 hours/use positioning/transfer devices   Ensure correct size (line/device) and apply per  instructions   Rotate device position/do not position patient on device  4/23/2025 2250 by Jane Webb RN  Outcome: Progressing  Flowsheets (Taken 4/23/2025 2250)  Promote skin healing:   Assess skin/pad under line(s)/device(s)   Protective dressings over bony prominences   Turn/reposition every 2 hours/use positioning/transfer devices   Ensure correct size (line/device) and apply per  instructions   Rotate device position/do not position patient on device     Problem: Pain - Adult  Goal: Verbalizes/displays adequate comfort level or baseline comfort level  4/23/2025 2250 by Jane Webb RN  Outcome: Progressing  4/23/2025 2250 by Jane Webb RN  Outcome: Progressing     Problem: Safety - Adult  Goal: Free from fall injury  4/23/2025 2250 by Jane Webb RN  Outcome: Progressing  4/23/2025 2250 by Jane Webb RN  Outcome: Progressing     Problem: Discharge Planning  Goal: Discharge to home or other facility with appropriate resources  4/23/2025 2250 by Jane Webb RN  Outcome: Progressing  4/23/2025 2250 by Jane Webb RN  Outcome: Progressing     Problem: Chronic Conditions and Co-morbidities  Goal: Patient's chronic conditions and co-morbidity  symptoms are monitored and maintained or improved  4/23/2025 2250 by Jane Webb RN  Outcome: Progressing  4/23/2025 2250 by Jane Webb RN  Outcome: Progressing     Problem: Nutrition  Goal: Nutrient intake appropriate for maintaining nutritional needs  4/23/2025 2250 by Jane Webb RN  Outcome: Progressing  4/23/2025 2250 by Jane Webb RN  Outcome: Progressing     Problem: Mechanical Ventilation  Goal: Patient Will Maintain Patent Airway  4/23/2025 2250 by Jane Webb RN  Outcome: Progressing  4/23/2025 2250 by Jane Webb RN  Outcome: Progressing  Goal: Oral health is maintained or improved  4/23/2025 2250 by Jane Webb RN  Outcome: Progressing  4/23/2025 2250 by Jane Webb RN  Outcome: Progressing  Goal: ET tube will be managed safely  4/23/2025 2250 by Jane Webb RN  Outcome: Progressing  4/23/2025 2250 by Jane Webb RN  Outcome: Progressing  Goal: Ability to express needs and understand communication  4/23/2025 2250 by Jane Webb RN  Outcome: Progressing  4/23/2025 2250 by Jane Webb RN  Outcome: Progressing  Goal: Mobility/activity is maintained at optimum level for patient  4/23/2025 2250 by Jane Webb RN  Outcome: Progressing  4/23/2025 2250 by Jane Webb RN  Outcome: Progressing

## 2025-04-25 NOTE — SIGNIFICANT EVENT
Consent for heparin infusion:     Was obtained from next of kin, Mr. Jose F Escalante II over the phone and witnessed and confirmed by ICU NP Ms. Magy Bashir.

## 2025-04-25 NOTE — DISCHARGE SUMMARY
Discharge Diagnosis  Intracranial bleed (Multi)  Issues Requiring Follow-Up  None, patient .   Test Results Pending At Discharge  Pending Labs       Order Current Status    Extra Urine Gray Tube Collected (25 0300)    Urinalysis with Reflex Culture and Microscopic In process        Hospital Course  54 YOM with CHF, HTN, DLP, CKD, BPH, asthma, IBS with diarrhea, polyneuropathy, impaired fasting glucose, who presented to the ED after beng found unresponsive. He was intubated upon arrival to the ED. Work up revealed leukocytosis, hyperglycemia, bilateral airspace opacities on CXR, and massive ICH with significant mass effect on head CT. Neurosurgery was called who did not think the patient injury was survivable. Patient now is admitted to ICU for further care until he is transitioned to comfort care measures. He was taken to OR on 2025, was compassionately extubated and pronounced death at 13:39. Significant other at the bedside.   Pertinent Physical Exam At Time of Discharge  Home Medications     Medication List      ASK your doctor about these medications     acetaminophen 650 mg ER tablet; Commonly known as: Tylenol 8 HOUR; Take   1 tablet (650 mg) by mouth every 8 hours if needed for mild pain (1 - 3).   Do not crush, chew, or split.   albuterol 90 mcg/actuation inhaler; INHALE 2 PUFFS BY MOUTH EVERY 4   HOURS AS NEEDED FOR WHEEZING   budesonide-formoterol 80-4.5 mcg/actuation inhaler; Commonly known as:   Symbicort; Inhale 2 puffs every 12 hours.   ibuprofen 800 mg tablet; TAKE 1 TABLET BY MOUTH EVERY 8 HOURS WITH FOOD   OR MILK AS NEEDED   lisinopriL-hydrochlorothiazide 20-12.5 mg tablet; TAKE 1 TABLET BY MOUTH   EVERY DAY   nebivolol 20 mg tablet; Commonly known as: Bystolic; Take 1 tablet (20   mg) by mouth once daily.   rosuvastatin 20 mg tablet; Commonly known as: Crestor; TAKE 1 TABLET BY   MOUTH EVERY DAY       Outpatient Follow-Up  Future Appointments   Date Time Provider Department  [facility]   5/1/2025  9:30 AM MD NICO Dykes MD

## 2025-04-25 NOTE — SIGNIFICANT EVENT
Time of death:     54 YOM with h/o CHF, HTN, DLP, CKD, BPH, asthma, here for massive ICH. Made DNAR by the family with the plan to move to comfort care measures and compassionate extubation for organ donation. He was moved to OR and extubated on 13:21. Subsequently went to PEA at 13:34. No BP, no pulse, no spontaneous breathing, no response to tactile stimuli. Pupils dilated and fixed. Based on [employer] protocol was monitored for 5 minutes, during which remained pulseless and without measurable blood pressure. No heart sounds on auscultation. He was pronounced  at 13:39.

## 2025-04-25 NOTE — PROGRESS NOTES
Mu Escalante is a 54 y.o. male on day 4 of admission presenting with Intracranial bleed (Multi).  Patient with h/o CHF, HTN, DLP, CKD, BPH, asthma, IBS with diarrhea, polyneuropathy, impaired fasting glucose, who presented to the ED after beng found unresponsive. He was intubated upon arrival to the ED. Work up revealed leukocytosis, hyperglycemia, bilateral airspace opacities on CXR, and massive ICH with significant mass effect on head CT. Neurosurgery was called who did not think the patient injury was survivable. Patient now is admitted to ICU for further care until he is transitioned to comfort care measures.   Subjective   Worsening hypotension and hemodynamic instability, started on Vasopressin, also increased Levophed needs. Cr and bilirubin are increasing.      The patient is unresponsive and cannot provide history   Objective   Scheduled medications  insulin lispro, 0-5 Units, subcutaneous, q4h  oxygen, , inhalation, Continuous - Inhalation  pantoprazole, 40 mg, intravenous, Daily before breakfast    Continuous medications  norepinephrine in sodium chloride 0.9 %, 0-1 mcg/kg/min, Last Rate: 0.53 mcg/kg/min (04/25/25 0746)  vasopressin, 0.03 Units/min, Last Rate: 0.03 Units/min (04/25/25 0700)    PRN medications  PRN medications: acetaminophen, albuterol, dextrose, dextrose, glucagon, glucagon, hydrALAZINE, labetaloL, ondansetron, polyethylene glycol   Physical Exam  Constitutional:       General: He is not in acute distress.     Appearance: He is obese.   HENT:      Head: Normocephalic and atraumatic.      Mouth/Throat:      Comments: ET and G tube in place.  Eyes:      General: No scleral icterus.     Comments: [place]-orbital ecchymosis L eye, per family birth carmen. Pupils dilated and fixed.    Neck:      Vascular: No carotid bruit.   Cardiovascular:      Rate and Rhythm: Normal rate and regular rhythm.      Heart sounds: No murmur heard.     No friction rub. No gallop.   Pulmonary:      Effort:  "Pulmonary effort is normal. No respiratory distress.      Breath sounds: Rales (diffuse bilaterally) present. No wheezing.   Abdominal:      General: Bowel sounds are normal. There is no distension.      Palpations: Abdomen is soft.      Tenderness: There is no abdominal tenderness.   Musculoskeletal:         General: No tenderness.      Cervical back: Neck supple. No rigidity or tenderness.      Right lower leg: Edema (2+) present.      Left lower leg: Edema (2+) present.   Skin:     General: Skin is warm and dry.      Coloration: Skin is not jaundiced.      Findings: Bruising (L facial.) and erythema (Erythema/purple discoloration b/l LE) present.   Neurological:      Mental Status: He is alert.      Comments: Unresponsive, cannot assess   Psychiatric:      Comments: Cannot assess, unresponsive     Last Recorded Vitals  Blood pressure 105/77, pulse 106, temperature 37.9 °C (100.2 °F), temperature source Esophageal, resp. rate (!) 28, height 1.93 m (6' 4\"), weight (!) 179 kg (395 lb 8.1 oz), SpO2 96%.  Intake/Output last 3 Shifts:  I/O last 3 completed shifts:  In: 6332.5 (35.3 mL/kg) [I.V.:5815.8 (32.4 mL/kg); IV Piggyback:516.7]  Out: 1938 (10.8 mL/kg) [Urine:1938 (0.3 mL/kg/hr)]  Weight: 179.4 kg   Relevant Results  Results for orders placed or performed during the hospital encounter of 04/21/25 (from the past 24 hours)   Blood Gas Arterial Full Panel   Result Value Ref Range    POCT pH, Arterial 7.35 (L) 7.38 - 7.42 pH    POCT pCO2, Arterial 50 (H) 38 - 42 mm Hg    POCT pO2, Arterial 80 (L) 85 - 95 mm Hg    POCT SO2, Arterial 98 94 - 100 %    POCT Oxy Hemoglobin, Arterial 95.5 94.0 - 98.0 %    POCT Hematocrit Calculated, Arterial 42.0 41.0 - 52.0 %    POCT Sodium, Arterial 146 (H) 136 - 145 mmol/L    POCT Potassium, Arterial 4.4 3.5 - 5.3 mmol/L    POCT Chloride, Arterial 118 (H) 98 - 107 mmol/L    POCT Ionized Calcium, Arterial 1.35 (H) 1.10 - 1.33 mmol/L    POCT Glucose, Arterial 158 (H) 74 - 99 mg/dL    POCT " Lactate, Arterial 2.3 (H) 0.4 - 2.0 mmol/L    POCT Base Excess, Arterial 1.1 -2.0 - 3.0 mmol/L    POCT HCO3 Calculated, Arterial 27.6 (H) 22.0 - 26.0 mmol/L    POCT Hemoglobin, Arterial 14.0 13.5 - 17.5 g/dL    POCT Anion Gap, Arterial 5 (L) 10 - 25 mmo/L    Patient Temperature 37.0 degrees Celsius    FiO2 70 %    Ventilator Mode Other     Ventilator Rate 26 bpm    Tidal Volume 630 mL    Peep CHM2O 8.0 cm H2O    Site of Arterial Puncture Arterial Line     Joey's Test Negative    POCT GLUCOSE   Result Value Ref Range    POCT Glucose 157 (H) 74 - 99 mg/dL   POCT GLUCOSE   Result Value Ref Range    POCT Glucose 164 (H) 74 - 99 mg/dL   Basic metabolic panel   Result Value Ref Range    Glucose 150 (H) 74 - 99 mg/dL    Sodium 149 (H) 136 - 145 mmol/L    Potassium 4.1 3.5 - 5.3 mmol/L    Chloride 118 (H) 98 - 107 mmol/L    Bicarbonate 24 21 - 32 mmol/L    Anion Gap 11 10 - 20 mmol/L    Urea Nitrogen 26 (H) 6 - 23 mg/dL    Creatinine 2.83 (H) 0.50 - 1.30 mg/dL    eGFR 25 (L) >60 mL/min/1.73m*2    Calcium 8.8 8.6 - 10.3 mg/dL   CBC   Result Value Ref Range    WBC 18.5 (H) 4.4 - 11.3 x10*3/uL    nRBC 0.0 0.0 - 0.0 /100 WBCs    RBC 3.99 (L) 4.50 - 5.90 x10*6/uL    Hemoglobin 13.1 (L) 13.5 - 17.5 g/dL    Hematocrit 41.5 41.0 - 52.0 %     (H) 80 - 100 fL    MCH 32.8 26.0 - 34.0 pg    MCHC 31.6 (L) 32.0 - 36.0 g/dL    RDW 14.5 11.5 - 14.5 %    Platelets 102 (L) 150 - 450 x10*3/uL   Calcium, ionized   Result Value Ref Range    POCT Calcium, Ionized 1.26 1.1 - 1.33 mmol/L   Phosphorus   Result Value Ref Range    Phosphorus 1.8 (L) 2.5 - 4.9 mg/dL   Magnesium   Result Value Ref Range    Magnesium 1.47 (L) 1.60 - 2.40 mg/dL   Lactate   Result Value Ref Range    Lactate 1.8 0.4 - 2.0 mmol/L   Hepatic function panel   Result Value Ref Range    Albumin 2.8 (L) 3.4 - 5.0 g/dL    Bilirubin, Total 2.3 (H) 0.0 - 1.2 mg/dL    Bilirubin, Direct 1.3 (H) 0.0 - 0.3 mg/dL    Alkaline Phosphatase 65 33 - 120 U/L    ALT 14 10 - 52 U/L     AST 18 9 - 39 U/L    Total Protein 5.6 (L) 6.4 - 8.2 g/dL   Protime-INR   Result Value Ref Range    Protime 13.0 (H) 9.3 - 12.7 seconds    INR 1.2 0.9 - 1.2   APTT   Result Value Ref Range    aPTT 27.4 22.0 - 32.5 seconds   Blood Gas Arterial Full Panel   Result Value Ref Range    POCT pH, Arterial 7.35 (L) 7.38 - 7.42 pH    POCT pCO2, Arterial 47 (H) 38 - 42 mm Hg    POCT pO2, Arterial 182 (H) 85 - 95 mm Hg    POCT SO2, Arterial 99 94 - 100 %    POCT Oxy Hemoglobin, Arterial 96.6 94.0 - 98.0 %    POCT Hematocrit Calculated, Arterial 41.0 41.0 - 52.0 %    POCT Sodium, Arterial 146 (H) 136 - 145 mmol/L    POCT Potassium, Arterial 4.2 3.5 - 5.3 mmol/L    POCT Chloride, Arterial 117 (H) 98 - 107 mmol/L    POCT Ionized Calcium, Arterial 1.33 1.10 - 1.33 mmol/L    POCT Glucose, Arterial 159 (H) 74 - 99 mg/dL    POCT Lactate, Arterial 1.8 0.4 - 2.0 mmol/L    POCT Base Excess, Arterial -0.2 -2.0 - 3.0 mmol/L    POCT HCO3 Calculated, Arterial 25.9 22.0 - 26.0 mmol/L    POCT Hemoglobin, Arterial 13.7 13.5 - 17.5 g/dL    POCT Anion Gap, Arterial 7 (L) 10 - 25 mmo/L    Patient Temperature 37.0 degrees Celsius    FiO2 100 %    Ventilator Mode Other     Ventilator Rate 28 bpm    Tidal Volume 650 mL    Peep CHM2O 8.0 cm H2O    Site of Arterial Puncture Arterial Line     Joey's Test Negative    Blood Gas Arterial Full Panel   Result Value Ref Range    POCT pH, Arterial 7.36 (L) 7.38 - 7.42 pH    POCT pCO2, Arterial 46 (H) 38 - 42 mm Hg    POCT pO2, Arterial 89 85 - 95 mm Hg    POCT SO2, Arterial 99 94 - 100 %    POCT Oxy Hemoglobin, Arterial 96.5 94.0 - 98.0 %    POCT Hematocrit Calculated, Arterial 41.0 41.0 - 52.0 %    POCT Sodium, Arterial 144 136 - 145 mmol/L    POCT Potassium, Arterial 4.1 3.5 - 5.3 mmol/L    POCT Chloride, Arterial 118 (H) 98 - 107 mmol/L    POCT Ionized Calcium, Arterial 1.30 1.10 - 1.33 mmol/L    POCT Glucose, Arterial 166 (H) 74 - 99 mg/dL    POCT Lactate, Arterial 1.9 0.4 - 2.0 mmol/L    POCT Base  Excess, Arterial 0.1 -2.0 - 3.0 mmol/L    POCT HCO3 Calculated, Arterial 26.0 22.0 - 26.0 mmol/L    POCT Hemoglobin, Arterial 13.7 13.5 - 17.5 g/dL    POCT Anion Gap, Arterial 4 (L) 10 - 25 mmo/L    Patient Temperature 37.0 degrees Celsius    FiO2 100 %    Ventilator Mode Other     Ventilator Rate 30 bpm    Tidal Volume 650 mL    Peep CHM2O 10.0 cm H2O    Site of Arterial Puncture Arterial Line     Joey's Test Negative    POCT GLUCOSE   Result Value Ref Range    POCT Glucose 164 (H) 74 - 99 mg/dL   POCT GLUCOSE   Result Value Ref Range    POCT Glucose 141 (H) 74 - 99 mg/dL   Prepare RBC: 5 Units, Leukocytes Reduced (CMV reduced risk)   Result Value Ref Range    PRODUCT CODE S5928E84     Unit Number [id number]     Unit ABO O     Unit RH NEG     XM INTEP COMP     Dispense Status XM     Blood Expiration Date 5/9/2025 11:59:00 PM EDT     PRODUCT BLOOD TYPE      UNIT VOLUME 350     PRODUCT CODE P0635D28     Unit Number [id number]     Unit ABO O     Unit RH NEG     XM INTEP COMP     Dispense Status XM     Blood Expiration Date 5/9/2025 11:59:00 PM EDT     PRODUCT BLOOD TYPE 9500     UNIT VOLUME 350     PRODUCT CODE S8180D42     Unit Number [id number]     Unit ABO O     Unit RH NEG     XM INTEP COMP     Dispense Status XM     Blood Expiration Date 5/13/2025 11:59:00 PM EDT     PRODUCT BLOOD TYPE      UNIT VOLUME 281     PRODUCT CODE F5294J53     Unit Number [id number]     Unit ABO O     Unit RH NEG     XM INTEP COMP     Dispense Status XM     Blood Expiration Date 5/15/2025 11:59:00 PM EDT     PRODUCT BLOOD TYPE 9500     UNIT VOLUME 350     PRODUCT CODE P6221C31     Unit Number [id number]     Unit ABO O     Unit RH NEG     XM INTEP COMP     Dispense Status XM     Blood Expiration Date 5/23/2025 11:59:00 PM EDT     PRODUCT BLOOD TYPE      UNIT VOLUME 350    POCT GLUCOSE   Result Value Ref Range    POCT Glucose 128 (H) 74 - 99 mg/dL   Basic metabolic panel   Result Value Ref Range    Glucose 124 (H)  74 - 99 mg/dL    Sodium 147 (H) 136 - 145 mmol/L    Potassium 4.4 3.5 - 5.3 mmol/L    Chloride 115 (H) 98 - 107 mmol/L    Bicarbonate 24 21 - 32 mmol/L    Anion Gap 12 10 - 20 mmol/L    Urea Nitrogen 32 (H) 6 - 23 mg/dL    Creatinine 4.40 (H) 0.50 - 1.30 mg/dL    eGFR 15 (L) >60 mL/min/1.73m*2    Calcium 8.8 8.6 - 10.3 mg/dL   CBC   Result Value Ref Range    WBC 19.3 (H) 4.4 - 11.3 x10*3/uL    nRBC 0.0 0.0 - 0.0 /100 WBCs    RBC 4.21 (L) 4.50 - 5.90 x10*6/uL    Hemoglobin 13.8 13.5 - 17.5 g/dL    Hematocrit 43.5 41.0 - 52.0 %     (H) 80 - 100 fL    MCH 32.8 26.0 - 34.0 pg    MCHC 31.7 (L) 32.0 - 36.0 g/dL    RDW 14.5 11.5 - 14.5 %    Platelets 105 (L) 150 - 450 x10*3/uL   Calcium, ionized   Result Value Ref Range    POCT Calcium, Ionized 1.21 1.1 - 1.33 mmol/L   Phosphorus   Result Value Ref Range    Phosphorus 2.8 2.5 - 4.9 mg/dL   Magnesium   Result Value Ref Range    Magnesium 1.34 (L) 1.60 - 2.40 mg/dL   Lactate   Result Value Ref Range    Lactate 2.0 0.4 - 2.0 mmol/L   Hepatic function panel   Result Value Ref Range    Albumin 2.9 (L) 3.4 - 5.0 g/dL    Bilirubin, Total 3.5 (H) 0.0 - 1.2 mg/dL    Bilirubin, Direct 2.0 (H) 0.0 - 0.3 mg/dL    Alkaline Phosphatase 84 33 - 120 U/L    ALT 19 10 - 52 U/L    AST 27 9 - 39 U/L    Total Protein 6.0 (L) 6.4 - 8.2 g/dL   Protime-INR   Result Value Ref Range    Protime 13.0 (H) 9.3 - 12.7 seconds    INR 1.2 0.9 - 1.2   APTT   Result Value Ref Range    aPTT 28.5 22.0 - 32.5 seconds   Type and screen   Result Value Ref Range    ABO TYPE O     Rh TYPE POS     ANTIBODY SCREEN NEG    Blood Gas Arterial Full Panel   Result Value Ref Range    POCT pH, Arterial 7.33 (L) 7.38 - 7.42 pH    POCT pCO2, Arterial 49 (H) 38 - 42 mm Hg    POCT pO2, Arterial 109 (H) 85 - 95 mm Hg    POCT SO2, Arterial 99 94 - 100 %    POCT Oxy Hemoglobin, Arterial 96.9 94.0 - 98.0 %    POCT Hematocrit Calculated, Arterial 42.0 41.0 - 52.0 %    POCT Sodium, Arterial 143 136 - 145 mmol/L    POCT  Potassium, Arterial 5.1 3.5 - 5.3 mmol/L    POCT Chloride, Arterial 118 (H) 98 - 107 mmol/L    POCT Ionized Calcium, Arterial 1.31 1.10 - 1.33 mmol/L    POCT Glucose, Arterial 133 (H) 74 - 99 mg/dL    POCT Lactate, Arterial 2.0 0.4 - 2.0 mmol/L    POCT Base Excess, Arterial -0.8 -2.0 - 3.0 mmol/L    POCT HCO3 Calculated, Arterial 25.8 22.0 - 26.0 mmol/L    POCT Hemoglobin, Arterial 14.1 13.5 - 17.5 g/dL    POCT Anion Gap, Arterial 4 (L) 10 - 25 mmo/L    Patient Temperature 37.0 degrees Celsius    FiO2 100 %    Apparatus      Ventilator Mode      Ventilator Rate 28 bpm    Tidal Volume 630 mL    Peep CHM2O 8.0 cm H2O    Site of Arterial Puncture Arterial Line    Urinalysis with Reflex Culture and Microscopic   Result Value Ref Range    Color, Urine Orange (N) Light-Yellow, Yellow, Dark-Yellow    Appearance, Urine Turbid (N) Clear    Specific Gravity, Urine 1.021 1.005 - 1.035    pH, Urine 5.5 5.0, 5.5, 6.0, 6.5, 7.0, 7.5, 8.0    Protein, Urine 100 (2+) (A) NEGATIVE, 10 (TRACE), 20 (TRACE) mg/dL    Glucose, Urine Normal Normal mg/dL    Blood, Urine 1.0 (3+) (A) NEGATIVE mg/dL    Ketones, Urine NEGATIVE NEGATIVE mg/dL    Bilirubin, Urine 1 (1+) (A) NEGATIVE mg/dL    Urobilinogen, Urine 3 (1+) (A) Normal mg/dL    Nitrite, Urine NEGATIVE NEGATIVE    Leukocyte Esterase, Urine NEGATIVE NEGATIVE   Urinalysis Microscopic   Result Value Ref Range    WBC, Urine 1-5 1-5, NONE /HPF    RBC, Urine >20 (A) NONE, 1-2, 3-5 /HPF    Bacteria, Urine 1+ (A) NONE SEEN /HPF   Amylase   Result Value Ref Range    Amylase 24 (L) 29 - 103 U/L   Lipase   Result Value Ref Range    Lipase 10 9 - 82 U/L   POCT GLUCOSE   Result Value Ref Range    POCT Glucose 125 (H) 74 - 99 mg/dL   POCT GLUCOSE   Result Value Ref Range    POCT Glucose 134 (H) 74 - 99 mg/dL   EEG  Result Date: 4/23/2025  IMPRESSION Impression This 34 minute EEG, performed as per American Clinical Neurophysiology Society (ACNS) guidelines, shows electrocerebral activity, which is  consistent with a diagnosis of electrocerebral death (in the absence of confounding factors such as sedation, hypot hermia or severe electrolyte disturbances). As per the updated AAN guidelines, EEG should not be used to assist with a diagnosis of brain death. A full report will be scanned into the patient's chart at a later time. This report has been interpreted and electronically signed by    Assessment & Plan  Intracranial bleed (Multi)    54 YOM with h/o CHF, HTN, DLP, CKD, BPH, asthma, IBS with diarrhea, polyneuropathy, impaired fasting glucose, who presented to the ED after beng found unresponsive. He was intubated upon arrival to the ED. Work up revealed leukocytosis, hyperglycemia, bilateral airspace opacities on CXR, and massive ICH with significant mass effect on head CT. Neurosurgery was called who did not think the patient injury was survivable. Patient now is admitted to ICU for further care until he is transitioned to comfort care measures.     Neuro: ICH with early sign of herniation. Per neurosurgery, no salvageable. H/o polyneuropathy       Neuro consult for neuro-prognostication done, Input appreciated       Plan to perform apnea test this am after ABG appropriate for the test.       [facility] was updated.     CV: hemodynamic instability likely due to increased ICP and volume loss 2/2 DI. Still requiring low dose of Levophed. Patient with h/o CHF, DLP, HTN.        Continue Levophed, vasopressin, wean off as tolerates       Will stop the IVF for now       Hold home BP medications.        DCed home statins     Respiratory: acute hypoxic respiratory failure 2/2 pneumonia vs ARDS. Vent requirements overall moderate.          Continue MV        Terminal extubation today       Albuterol PRN         Renal: increased urine output due to DI. Cr WNL. H/o BPH, CKD II. Now with worsening [place]       Maintain BP with vasopressors as above       Is/Os       Daily RFP       Treat electrolyte abnormalities as  indicated    GI: h/o IBS with diarrhea, no acute issues        NPO        GI prophylaxis    Endo: no acute issues        Continue to monitor         Hypoglycemic protocol        SSI    Hem/Onc: no issues        Continue to monitor with daily CBC    ID: patient with possible aspiration pneumonia. Received Zosyn in the ED, Family wants to withdraw care. Per [facility] not a candidate for lung transplant. WBC slightly higher today. Fever likely due to ICH        Will monitor for onw    DVT prophylaxis: SDC, hold off chemical due to massive ICH.   This was a critical care visit for respiratory failure and ICH, total time 44 min.     René Romero MD

## 2025-04-25 NOTE — PROGRESS NOTES
Spiritual Care Visit  Spiritual Care Request    Reason for Visit:  Routine Visit: Follow-up  Crisis Visit: Patient actively dying ([facility]ero walk)     Request Received From:       Focus of Care:  Visited With: Family   Care Provided for Crisis Visit: Supported family, Responded to the crisis/alert     Refer to :          Spiritual Care Assessment    Spiritual Assessment:                      Care Provided:  Intended Effects: Establish rapport and connectedness, Build relationship of care and support, Convey a calming presence, Demonstrate caring and concern, Journeying with someone in the grief process  Methods: Offer emotional support  Interventions: Facilitate life review    Sense of Community and or Adventism Affiliation:  None         Addressed Needs/Concerns and/or [place] Through:          Outcome:        Advance Directives:         Spiritual Care Annotation      Annotation:  was made aware of the [facility] walk. Patient's significant other and family members were at bedside.  offered emotional support for the family.  inquired about support family declined any formal spiritual intervention today. Spiritual care will remain available.

## 2025-04-25 NOTE — CARE PLAN
The patient's goals for the shift include      The clinical goals for the shift include maintain hemodynamic stability      Problem: Fall/Injury  Goal: Not fall by end of shift  Outcome: Progressing  Goal: Be free from injury by end of the shift  Outcome: Progressing  Goal: Verbalize understanding of personal risk factors for fall in the hospital  Outcome: Progressing  Goal: Verbalize understanding of risk factor reduction measures to prevent injury from fall in the home  Outcome: Progressing  Goal: Use assistive devices by end of the shift  Outcome: Progressing  Goal: Pace activities to prevent fatigue by end of the shift  Outcome: Progressing     Problem: Skin  Goal: Decreased wound size/increased tissue granulation at next dressing change  4/25/2025 0545 by Delmy Dill RN  Flowsheets (Taken 4/25/2025 0545)  Decreased wound size/increased tissue granulation at next dressing change:   Promote sleep for wound healing   Protective dressings over bony prominences   Utilize specialty bed per algorithm  4/25/2025 0544 by Delmy Dill RN  Outcome: Progressing  Goal: Participates in plan/prevention/treatment measures  4/25/2025 0545 by Delmy Dill RN  Flowsheets (Taken 4/25/2025 0545)  Participates in plan/prevention/treatment measures:   Discuss with provider PT/OT consult   Elevate heels   Increase activity/out of bed for meals  4/25/2025 0544 by Delmy Dill RN  Outcome: Progressing  Goal: Prevent/manage excess moisture  4/25/2025 0545 by Delmy Dill RN  Flowsheets (Taken 4/25/2025 0545)  Prevent/manage excess moisture:   Cleanse incontinence/protect with barrier cream   Follow provider orders for dressing changes   Monitor for/manage infection if present   Moisturize dry skin  4/25/2025 0544 by Delmy Dill RN  Outcome: Progressing  Goal: Prevent/minimize sheer/friction injuries  4/25/2025 0545 by Delmy Dill RN  Flowsheets (Taken 4/25/2025 0545)  Prevent/minimize sheer/friction injuries:   Complete micro-shifts  as needed if patient unable. Adjust patient position to relieve pressure points, not a full turn   HOB 30 degrees or less   Increase activity/out of bed for meals   Turn/reposition every 2 hours/use positioning/transfer devices   Use pull sheet   Utilize specialty bed per algorithm  4/25/2025 0544 by Delmy Dill RN  Outcome: Progressing  Goal: Promote/optimize nutrition  4/25/2025 0545 by Delmy Dill RN  Flowsheets (Taken 4/25/2025 0545)  Promote/optimize nutrition: Monitor/record intake including meals  4/25/2025 0544 by Delmy Dill RN  Outcome: Progressing  Goal: Promote skin healing  4/25/2025 0545 by Delmy Dill RN  Flowsheets (Taken 4/25/2025 0545)  Promote skin healing:   Assess skin/pad under line(s)/device(s)   Ensure correct size (line/device) and apply per  instructions   Protective dressings over bony prominences   Rotate device position/do not position patient on device   Turn/reposition every 2 hours/use positioning/transfer devices  4/25/2025 0544 by Delmy Dill RN  Outcome: Progressing     Problem: Pain - Adult  Goal: Verbalizes/displays adequate comfort level or baseline comfort level  Outcome: Progressing     Problem: Safety - Adult  Goal: Free from fall injury  Outcome: Progressing     Problem: Discharge Planning  Goal: Discharge to home or other facility with appropriate resources  Outcome: Progressing     Problem: Chronic Conditions and Co-morbidities  Goal: Patient's chronic conditions and co-morbidity symptoms are monitored and maintained or improved  Outcome: Progressing     Problem: Nutrition  Goal: Nutrient intake appropriate for maintaining nutritional needs  Outcome: Progressing     Problem: Knowledge Deficit  Goal: Patient/family/caregiver demonstrates understanding of disease process, treatment plan, medications, and discharge instructions  Outcome: Progressing     Problem: Mechanical Ventilation  Goal: Patient Will Maintain Patent Airway  Outcome: Progressing  Goal:  Oral health is maintained or improved  Outcome: Progressing  Goal: ET tube will be managed safely  Outcome: Progressing  Goal: Ability to express needs and understand communication  Outcome: Progressing  Goal: Mobility/activity is maintained at optimum level for patient  Outcome: Progressing     Problem: Skin  Goal: Decreased wound size/increased tissue granulation at next dressing change  4/25/2025 0545 by Delmy Dill RN  Flowsheets (Taken 4/25/2025 0545)  Decreased wound size/increased tissue granulation at next dressing change:   Promote sleep for wound healing   Protective dressings over bony prominences   Utilize specialty bed per algorithm  4/25/2025 0544 by Delmy Dill RN  Outcome: Progressing     Problem: Pain - Adult  Goal: Verbalizes/displays adequate comfort level or baseline comfort level  Outcome: Progressing     Problem: Safety - Adult  Goal: Free from fall injury  Outcome: Progressing     Problem: Mechanical Ventilation  Goal: Patient Will Maintain Patent Airway  Outcome: Progressing

## (undated) DEVICE — NEEDLE, HYPODERMIC, MONOJECT, 22 G X 1.5 IN, BLUE, RIGID PACK

## (undated) DEVICE — SUTURE, CHROMIC GUT, 3-0, SH 27"

## (undated) DEVICE — Device

## (undated) DEVICE — ELECTRODE, ELECTROSURGICAL, NEEDLE, 1.1 IN, LF

## (undated) DEVICE — TIP, SUCTION, YANKAUER, BULB, ADULT

## (undated) DEVICE — SUPPORTER, ATHLETIC, ADULT, LARGE

## (undated) DEVICE — CUTTER, PROXIMATE LINEAR RELOAD, 75MM, BLUE

## (undated) DEVICE — CATHETER, IV, ANGIOCATH, SPECIAL PLACEMENT, 14 G X 3.25 IN, FEP POLYMER

## (undated) DEVICE — PACKING, IODOFORM, CURITY, 0.5 IN X 5 YD, STERILE

## (undated) DEVICE — DRESSING, GAUZE, SUPER KERLIX, 6X6

## (undated) DEVICE — SPONGE, LAP, X-RAY, RF DETECT, 18 X 18IN, STERILE

## (undated) DEVICE — PREP TRAY, VAGINAL

## (undated) DEVICE — GLOVE, SURGICAL, PROTEXIS PI BLUE W/NEUTHERA, 7.5, PF, LF

## (undated) DEVICE — ADHESIVE, SKIN, DERMABOND ADVANCED, 15CM, PEN-STYLE

## (undated) DEVICE — DRAPE, SHEET, THREE QUARTER, FAN FOLD, 57 X 77 IN

## (undated) DEVICE — APPLIER, LIGACLIP MULTICLIP, 20 LG CLIP 13 1/4

## (undated) DEVICE — SUTURE, SILK, 2-0, TIES, 12-30 IN, BLACK

## (undated) DEVICE — SYRINGE, 60 CC, IRRIGATION, BULB, CONTRO-BULB, PAPER POUCH

## (undated) DEVICE — SOLUTION, IRRIGATION, X RX SODIUM CHL 0.9%, 1000ML BTL

## (undated) DEVICE — APPLIER, MULTIPLE CLIP, LIGACLIP, W/20 MEDIUM, 11.5 APPLIER

## (undated) DEVICE — GOWN, SURGICAL, REINFORCED, XLARGE, X-LONG, STERILE

## (undated) DEVICE — PANTY, MESH, LARGE

## (undated) DEVICE — SUTURE, SILK, 2-0, 30 IN, SH, BLACK

## (undated) DEVICE — STAPLER, SKIN, DISPOSABLE, 35 COUNT, WIDE, STERILE

## (undated) DEVICE — SUTURE, PROLENE, 5-0, 36 IN, C1, DA, BLUE

## (undated) DEVICE — SYRINGE, 10 CC, LUER LOCK

## (undated) DEVICE — PACKING, IODOFORM, CURITY, 1 IN X 5 YD, STERILE

## (undated) DEVICE — TIP, SUCTION, YANKAUER, BULB, OPEN TIP, W/O CONTROL VENT, LF, CLEAR

## (undated) DEVICE — GLOVE, SURGICAL, PROTEXIS PI , 7.0, PF, LF

## (undated) DEVICE — BLADE, SAW STERNUM, STERILE

## (undated) DEVICE — SUTURE, PROLENE, 3-0, 30 IN, FSL, BLUE

## (undated) DEVICE — SUTURE, SILK, 0 PERMA HAND, BR, SUTUPAK, 30IN, BLACK

## (undated) DEVICE — PANTY, MESH, XX-LARGE, SHORT TERM, DISP

## (undated) DEVICE — APPLICATOR, COTTON TIP, 6 IN, 2PK, STERILE

## (undated) DEVICE — GLOVE, SURGEON, PREMIERPRO PI, MICRO, SZ-7.5, PF, WH

## (undated) DEVICE — DRAPE, SHEET, FAN FOLDED, HALF, 44 X 58 IN, DISPOSABLE, LF, STERILE

## (undated) DEVICE — DRAPE, SHEET, VI, W/BETADINE

## (undated) DEVICE — SUTURE, SILK, 0, 24 IN, SUTUPAK, BLACK

## (undated) DEVICE — DRAPE, INCISE, ANTIMICROBIAL, IOBAN 2, STERI DRAPE, 23 X 33 IN, DISPOSABLE, STERILE

## (undated) DEVICE — SUTURE, PROLENE, 4-0 VB/RB-1, 36IN, BLUE

## (undated) DEVICE — DRAPE PACK, UNIVERSAL II

## (undated) DEVICE — SUTURE, SILK, 0, 18 IN, LABYRINTH, BLACK

## (undated) DEVICE — CUTTER, PROX LINEAR, 75MM, REG TISSUE, W/ SAFETY LOCK OUT

## (undated) DEVICE — GOWN, ASTOUND, XL

## (undated) DEVICE — TUBING, SUCTION, NON-CONDUCTIVE, W/CONNECT,.25 IN X 12 FT, STERILE, LF

## (undated) DEVICE — TOWEL PACK, STERILE, 4/PACK, BLUE

## (undated) DEVICE — GOWN, SURGICAL, SIRUS, NON REINFORCED, LARGE

## (undated) DEVICE — APPLIER, LIGACLIP, MULTIPLE, SMALL 9-3/8IN